# Patient Record
Sex: MALE | Race: ASIAN | NOT HISPANIC OR LATINO | Employment: OTHER | URBAN - METROPOLITAN AREA
[De-identification: names, ages, dates, MRNs, and addresses within clinical notes are randomized per-mention and may not be internally consistent; named-entity substitution may affect disease eponyms.]

---

## 2017-01-03 ENCOUNTER — THERAPY VISIT (OUTPATIENT)
Dept: PHYSICAL THERAPY | Facility: CLINIC | Age: 57
End: 2017-01-03
Payer: COMMERCIAL

## 2017-01-03 DIAGNOSIS — M54.42 MIDLINE LOW BACK PAIN WITH BILATERAL SCIATICA: Primary | ICD-10-CM

## 2017-01-03 DIAGNOSIS — M54.41 MIDLINE LOW BACK PAIN WITH BILATERAL SCIATICA: Primary | ICD-10-CM

## 2017-01-03 PROCEDURE — 97032 APPL MODALITY 1+ESTIM EA 15: CPT | Mod: GP | Performed by: PHYSICAL THERAPIST

## 2017-01-03 PROCEDURE — 97163 PT EVAL HIGH COMPLEX 45 MIN: CPT | Mod: GP | Performed by: PHYSICAL THERAPIST

## 2017-01-03 PROCEDURE — 97112 NEUROMUSCULAR REEDUCATION: CPT | Mod: GP | Performed by: PHYSICAL THERAPIST

## 2017-01-03 NOTE — PROGRESS NOTES
Subjective:                                       Pertinent medical history includes:  High blood pressure.  Medical allergies: no.  Other surgeries include:  None reported.  Current medications:  Pain medication, anti-depressants and high blood pressure medication.  Current occupation is none.                                  Objective:    System    Physical Exam    General     ROS    Assessment/Plan:

## 2017-01-03 NOTE — PROGRESS NOTES
Subjective:    Elaina Valenzuela is a 56 year old male with a lumbar condition.  Condition occurred with:  Other reason (failed surgery).  Condition occurred: for unknown reasons.  This is a chronic condition  Patient reports onset of low back pain in 2009 with no specific mechanism of injury. Patient reports having low back pain surgery in 2010 which did not help his symptoms..    Patient reports pain:  Central lumbar spine.  Radiates to:  Gluteals left, thigh right, thigh left, knee right, gluteals right, knee left, lower leg right and lower leg left.  Pain is described as aching and is constant and reported as 10/10.  Associated symptoms:  Numbness, loss of strength and loss of motion/stiffness. Pain is worse during the day.  Symptoms are exacerbated by walking, sitting, lifting, carrying, certain positions, bending and twisting and relieved by rest (supine).  Since onset symptoms are unchanged.  Special tests:  MRI (see epic).  Previous treatment includes physical therapy (poor outcome).    General health as reported by patient is good.                      Red flags:  None as reported by the patient.                      Objective:    Standing Alignment:    Cervical/Thoracic:  Forward head  Shoulder/UE:  Rounded shoulders              Gait:    Gait Type:  Antalgic   Assistive Devices:  Cane  Deviations:  Lumbar:  Trunk flexionGeneral Deviations:  Stride length decr and stance time decr               Lumbar/SI Evaluation  ROM:    AROM Lumbar:   Flexion:            Hands to mid thigh  Ext:                    Neutral, painful   Side Bend:        Left:  Hand to mid thigh, painful    Right:  Hand to mid thigh, painful  Rotation:           Left:  Limited, painful    Right:  Limited, painful  Side Glide:        Left:     Right:           Lumbar Myotomes:  Lumbar myotomes: grossly 3+/5 bilaterally.                Lumbar Dermtomes:  normal                Neural Tension/Mobility:    Left side:  SLR; SLR w/DF; Femoral  Nerve and Slump positive.   Right side:   SLR w/DF; Femoral Nerve; Slump and SLR positive.  Lumbar Palpation:  Palpation (lumbar): pain reported with palpation throughout the lumbar spine and gluts, bilaterally.      Functional Tests:  not assessed        Lumbar Provocation:    Left positive with:  Mobility and PROM hip  Right positive with: Mobility and PROM hip  Spinal Segmental Conclusions:     Level: Hypo noted at L5, L3, L2 and L4      SI joint/Sacrum:    Unremarkable                                                           General     ROS    Assessment/Plan:      Patient is a 56 year old male with lumbar complaints.    Patient has the following significant findings with corresponding treatment plan.                Diagnosis 1:  Low back pain  Pain -  hot/cold therapy, electric stimulation, self management, education and home program  Decreased ROM/flexibility - manual therapy, therapeutic exercise, therapeutic activity and home program  Decreased joint mobility - manual therapy, therapeutic exercise, therapeutic activity and home program  Decreased strength - therapeutic exercise, therapeutic activities and home program  Impaired gait - home program  Impaired muscle performance - neuro re-education and home program  Decreased function - therapeutic activities and home program  Impaired posture - neuro re-education, therapeutic activities and home program    Therapy Evaluation Codes:   1) History comprised of:   Personal factors that impact the plan of care:      Age, Coping style, Language, Past/current experiences, Social history/culture and Time since onset of symptoms.    Comorbidity factors that impact the plan of care are:      Bowel/bladder changes, Depression, Mental illness, Overweight and Weakness.     Medications impacting care: None.  2) Examination of Body Systems comprised of:   Body structures and functions that impact the plan of care:      Lumbar spine.   Activity limitations that impact the  plan of care are:      Bathing, Bending, Driving, Dressing, Jumping, Lifting, Running, Sitting, Squatting/kneeling and Standing.   Clinical presentation characteristics are:    Unstable/Unpredictable.  3) Presentation comprised of:   Presentation scored as High complexity with Unstable and unpredictable clinical characteristics..  4) Decision-Making    High complexity using standardized patient assessment instrument and/or measureable assessment of functional outcome.  Cumulative Therapy Evaluation is: High complexity.    Previous and current functional limitations:  (See Goal Flow Sheet for this information)    Short term and Long term goals: (See Goal Flow Sheet for this information)     Communication ability:  Patient appears to be able to clearly communicate and understand verbal and written communication and follow directions correctly.  Treatment Explanation - The following has been discussed with the patient:   RX ordered/plan of care  Anticipated outcomes  Possible risks and side effects  This patient would benefit from PT intervention to resume normal activities.   Rehab potential is good.    Frequency:  1 X week, once daily  Duration:  for 4 weeks  Discharge Plan:  Achieve all LTG.  Independent in home treatment program.  Reach maximal therapeutic benefit.    Please refer to the daily flowsheet for treatment today, total treatment time and time spent performing 1:1 timed codes.

## 2017-06-07 NOTE — PROGRESS NOTES
Subjective:    HPI                    Objective:    System    Physical Exam    General     ROS    Assessment/Plan:      DISCHARGE REPORT    Patient did not return to PT following the initial evaluation. Please see the evaluation note for the most recent subjective and objective findings. Patient will be discharged from PT services at this time.

## 2017-10-08 ENCOUNTER — HEALTH MAINTENANCE LETTER (OUTPATIENT)
Age: 57
End: 2017-10-08

## 2017-10-10 ENCOUNTER — OFFICE VISIT (OUTPATIENT)
Dept: FAMILY MEDICINE | Facility: CLINIC | Age: 57
End: 2017-10-10
Payer: COMMERCIAL

## 2017-10-10 VITALS
SYSTOLIC BLOOD PRESSURE: 124 MMHG | DIASTOLIC BLOOD PRESSURE: 78 MMHG | OXYGEN SATURATION: 97 % | BODY MASS INDEX: 32.15 KG/M2 | TEMPERATURE: 97.3 F | WEIGHT: 193 LBS | HEART RATE: 67 BPM | HEIGHT: 65 IN

## 2017-10-10 DIAGNOSIS — Z12.11 SCREEN FOR COLON CANCER: ICD-10-CM

## 2017-10-10 DIAGNOSIS — J45.20 MILD INTERMITTENT ASTHMA WITHOUT COMPLICATION: ICD-10-CM

## 2017-10-10 DIAGNOSIS — G89.29 CHRONIC LOW BACK PAIN, UNSPECIFIED BACK PAIN LATERALITY, WITH SCIATICA PRESENCE UNSPECIFIED: Primary | ICD-10-CM

## 2017-10-10 DIAGNOSIS — M54.5 CHRONIC LOW BACK PAIN, UNSPECIFIED BACK PAIN LATERALITY, WITH SCIATICA PRESENCE UNSPECIFIED: Primary | ICD-10-CM

## 2017-10-10 DIAGNOSIS — G60.9 IDIOPATHIC PERIPHERAL NEUROPATHY: ICD-10-CM

## 2017-10-10 DIAGNOSIS — I10 ESSENTIAL HYPERTENSION WITH GOAL BLOOD PRESSURE LESS THAN 140/90: ICD-10-CM

## 2017-10-10 DIAGNOSIS — N52.9 ERECTILE DYSFUNCTION, UNSPECIFIED ERECTILE DYSFUNCTION TYPE: ICD-10-CM

## 2017-10-10 DIAGNOSIS — Z13.6 CARDIOVASCULAR SCREENING; LDL GOAL LESS THAN 130: ICD-10-CM

## 2017-10-10 LAB
ALBUMIN SERPL-MCNC: 4 G/DL (ref 3.4–5)
ALP SERPL-CCNC: 88 U/L (ref 40–150)
ALT SERPL W P-5'-P-CCNC: 36 U/L (ref 0–70)
ANION GAP SERPL CALCULATED.3IONS-SCNC: 4 MMOL/L (ref 3–14)
AST SERPL W P-5'-P-CCNC: 27 U/L (ref 0–45)
BILIRUB SERPL-MCNC: 0.5 MG/DL (ref 0.2–1.3)
BUN SERPL-MCNC: 22 MG/DL (ref 7–30)
CALCIUM SERPL-MCNC: 8.8 MG/DL (ref 8.5–10.1)
CHLORIDE SERPL-SCNC: 97 MMOL/L (ref 94–109)
CHOLEST SERPL-MCNC: 197 MG/DL
CO2 SERPL-SCNC: 35 MMOL/L (ref 20–32)
CREAT SERPL-MCNC: 1.02 MG/DL (ref 0.66–1.25)
GFR SERPL CREATININE-BSD FRML MDRD: 76 ML/MIN/1.7M2
GLUCOSE SERPL-MCNC: 110 MG/DL (ref 70–99)
HDLC SERPL-MCNC: 34 MG/DL
LDLC SERPL CALC-MCNC: 113 MG/DL
NONHDLC SERPL-MCNC: 163 MG/DL
POTASSIUM SERPL-SCNC: 3.6 MMOL/L (ref 3.4–5.3)
PROT SERPL-MCNC: 8.5 G/DL (ref 6.8–8.8)
SODIUM SERPL-SCNC: 136 MMOL/L (ref 133–144)
TRIGL SERPL-MCNC: 250 MG/DL

## 2017-10-10 PROCEDURE — 99214 OFFICE O/P EST MOD 30 MIN: CPT | Performed by: FAMILY MEDICINE

## 2017-10-10 PROCEDURE — 80061 LIPID PANEL: CPT | Performed by: FAMILY MEDICINE

## 2017-10-10 PROCEDURE — 36415 COLL VENOUS BLD VENIPUNCTURE: CPT | Performed by: FAMILY MEDICINE

## 2017-10-10 PROCEDURE — 80053 COMPREHEN METABOLIC PANEL: CPT | Performed by: FAMILY MEDICINE

## 2017-10-10 RX ORDER — SILDENAFIL 100 MG/1
50-100 TABLET, FILM COATED ORAL DAILY PRN
Qty: 16 TABLET | Refills: 3 | Status: SHIPPED | OUTPATIENT
Start: 2017-10-10 | End: 2018-03-12

## 2017-10-10 RX ORDER — METOPROLOL TARTRATE 100 MG
100 TABLET ORAL 2 TIMES DAILY
Qty: 180 TABLET | Refills: 3 | Status: SHIPPED | OUTPATIENT
Start: 2017-10-10 | End: 2018-10-26

## 2017-10-10 RX ORDER — GABAPENTIN 300 MG/1
CAPSULE ORAL
Qty: 360 CAPSULE | Refills: 4 | Status: SHIPPED | OUTPATIENT
Start: 2017-10-10 | End: 2018-03-12

## 2017-10-10 RX ORDER — NAPROXEN 500 MG/1
500 TABLET ORAL 2 TIMES DAILY PRN
Qty: 180 TABLET | Refills: 5 | Status: SHIPPED | OUTPATIENT
Start: 2017-10-10 | End: 2018-12-17

## 2017-10-10 ASSESSMENT — ANXIETY QUESTIONNAIRES
2. NOT BEING ABLE TO STOP OR CONTROL WORRYING: SEVERAL DAYS
6. BECOMING EASILY ANNOYED OR IRRITABLE: SEVERAL DAYS
7. FEELING AFRAID AS IF SOMETHING AWFUL MIGHT HAPPEN: MORE THAN HALF THE DAYS
3. WORRYING TOO MUCH ABOUT DIFFERENT THINGS: MORE THAN HALF THE DAYS
5. BEING SO RESTLESS THAT IT IS HARD TO SIT STILL: NEARLY EVERY DAY
GAD7 TOTAL SCORE: 11
1. FEELING NERVOUS, ANXIOUS, OR ON EDGE: SEVERAL DAYS

## 2017-10-10 ASSESSMENT — PATIENT HEALTH QUESTIONNAIRE - PHQ9
5. POOR APPETITE OR OVEREATING: SEVERAL DAYS
SUM OF ALL RESPONSES TO PHQ QUESTIONS 1-9: 10

## 2017-10-10 ASSESSMENT — PAIN SCALES - GENERAL: PAINLEVEL: SEVERE PAIN (6)

## 2017-10-10 NOTE — PROGRESS NOTES
SUBJECTIVE:   Elaina Valenzuela is a 56 year old male who presents to clinic today for the following health issues:      Hypertension Follow-up      Outpatient blood pressures are being checked at store.  Results are 120-130/ 80's.    Low Salt Diet: no added salt    Depression and Anxiety Follow-Up    Status since last visit: Improved      Other associated symptoms:None    Complicating factors:     Significant life event: more trouble with walking     Current substance abuse: None    PHQ-9 Score and MyChart F/U Questions 11/9/2015 1/19/2016 12/29/2016   Total Score 15 7 6   Q9: Suicide Ideation Several days Not at all Not at all     JHONY-7 SCORE 6/8/2015 11/9/2015 12/29/2016   Total Score 17 - -   Total Score - 12 10       PHQ-9  English  PHQ-9   Any Language  GAD7  Suicide Assessment Five-step Evaluation and Treatment (SAFE-T)  Asthma Follow-Up    Was ACT completed today?    Yes    ACT Total Scores 12/22/2016   ACT TOTAL SCORE (Goal Greater than or Equal to 20) 19   In the past 12 months, how many times did you visit the emergency room for your asthma without being admitted to the hospital? 0   In the past 12 months, how many times were you hospitalized overnight because of your asthma? 0       Recent asthma triggers that patient is dealing with: None              Amount of exercise or physical activity: 2-3 days/week for an average of less than 15 minutes    Problems taking medications regularly: No    Medication side effects: none  Diet: low salt           Problem list and histories reviewed & adjusted, as indicated.  Additional history: as documented    Patient Active Problem List   Diagnosis     Chronic low back pain     Left leg weakness     CARDIOVASCULAR SCREENING; LDL GOAL LESS THAN 130     Urinary retention     Neurogenic bladder     Moderate major depression (H)     Mild intermittent asthma without complication     Idiopathic peripheral neuropathy     Erectile dysfunction, unspecified erectile dysfunction  type     Benign essential hypertension     Obesity due to excess calories, unspecified obesity severity     Past Surgical History:   Procedure Laterality Date     C NONSPECIFIC PROCEDURE      Rt. 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       Social History   Substance Use Topics     Smoking status: Never Smoker     Smokeless tobacco: Never Used     Alcohol use Yes      Comment: rare     Family History   Problem Relation Age of Onset     Hypertension Mother          Current Outpatient Prescriptions   Medication Sig Dispense Refill     metoprolol (LOPRESSOR) 100 MG tablet Take 1 tablet (100 mg) by mouth 2 times daily 180 tablet 3     naproxen (NAPROSYN) 500 MG tablet Take 1 tablet (500 mg) by mouth 2 times daily as needed for moderate pain 180 tablet 5     gabapentin (NEURONTIN) 300 MG capsule Take 2 in the morning and 2 at night 360 capsule 4     sildenafil (VIAGRA) 100 MG tablet Take 0.5-1 tablets ( mg) by mouth daily as needed Take 30 min to 4 hours before intercourse.  Never use with nitroglycerin, terazosin or doxazosin. 16 tablet 3     amitriptyline (ELAVIL) 100 MG tablet TAKE ONE TABLET BY MOUTH AT BEDTIME 90 tablet 3     albuterol (2.5 MG/3ML) 0.083% neb solution Take 1 vial (2.5 mg) by nebulization every 6 hours as needed for shortness of breath / dyspnea or wheezing 60 vial 6     hydrochlorothiazide (HYDRODIURIL) 25 MG tablet Take 1 tablet (25 mg) by mouth every morning 90 tablet 3     albuterol  (PROAIR HFA/PROVENTIL HFA/VENTOLIN HFA) 108 (90 BASE) MCG/ACT Inhaler Inhale 2 puffs into the lungs every 6 hours as needed for shortness of breath / dyspnea 1 Inhaler 5     order for DME Equipment being ordered: Nebulizer 1 each 0     citalopram (CELEXA) 10 MG tablet Take 1 tablet (10 mg) by mouth daily 90 tablet 4     TYLENOL EXTRA STRENGTH 500 MG PO TABS  2 tablets as needed       BENEFIBER DRINK MIX OR POWD 2 teaspoons w/water       [DISCONTINUED] metoprolol (LOPRESSOR) 100 MG tablet Take 1 tablet (100 mg) by mouth 2 times daily 180 tablet 3     [DISCONTINUED] gabapentin (NEURONTIN) 300 MG capsule Take 2 in the morning and 2 at night 360 capsule 4     [DISCONTINUED] sildenafil (VIAGRA) 100 MG cap/tab Take 0.5-1 tablets ( mg) by mouth daily as needed for erectile dysfunction Take 30 min to 4 hours before intercourse.  Never use with nitroglycerin, terazosin or doxazosin. 16 tablet 3     Allergies   Allergen Reactions     Penicillins Rash     BP Readings from Last 3 Encounters:   10/10/17 124/78   12/22/16 134/80   05/19/16 (!) 131/97    Wt Readings from Last 3 Encounters:   10/10/17 193 lb (87.5 kg)   12/22/16 196 lb (88.9 kg)   05/13/16 170 lb (77.1 kg)                  Labs reviewed in EPIC        Reviewed and updated as needed this visit by clinical staffTobacco  Allergies  Meds       Reviewed and updated as needed this visit by Provider         ROS:  This 56 year old male is here today to get his medications refilled. He is disabled from a back surgery that left him in chronic back pain and severe weakness in his legs such that he has to walk with a cane. He tried to ride a bike at the health [employer]. He feels the best when he is lying flat in bed. Sitting and standing too long makes his pain worse. He would like to see a neurosurgeon for a 2nd opinion about taking the hardware out of his back. His chronic pain is hard to deal with some days.   His asthma is under good control. Rarely uses albuterol  He  "is faithful with his meds  He is trying to eat healthy to avoid weight gain.  No shortness of breath with walking  Is sleeping OK  He needs another colonoscopy in 2017. His prep wasn't very good for his 2016 colonoscopy   All other review of systems are negative  Personal, family, and social history reviewed with patient and revised.         OBJECTIVE:     /78  Pulse 67  Temp 97.3  F (36.3  C) (Oral)  Ht 5' 5\" (1.651 m)  Wt 193 lb (87.5 kg)  SpO2 97%  BMI 32.12 kg/m2  Body mass index is 32.12 kg/(m^2).  GENERAL: healthy, alert and no distress  NECK: no adenopathy, no asymmetry, masses, or scars and thyroid normal to palpation  RESP: lungs clear to auscultation - no rales, rhonchi or wheezes  CV: regular rate and rhythm, normal S1 S2, no S3 or S4, no murmur, click or rub, no peripheral edema and peripheral pulses strong  ABDOMEN: soft, nontender, no hepatosplenomegaly, no masses and bowel sounds normal  MS: no gross musculoskeletal defects noted, no edema  But he has a lot of low back pain when he tries to stand up. His legs are very weak when he tries to walk. Needs to use a cane at all times.   He appears comfortable in a sitting position.   Well hydrated  Well nourished  Well groomed      Diagnostic Test Results:  none     ASSESSMENT/PLAN:              1. Chronic low back pain, unspecified back pain laterality, with sciatica presence unspecified  As above, continue same meds   - naproxen (NAPROSYN) 500 MG tablet; Take 1 tablet (500 mg) by mouth 2 times daily as needed for moderate pain  Dispense: 180 tablet; Refill: 5  - COMPREHENSIVE METABOLIC PANEL  - NEUROSURGERY REFERRAL    2. Essential hypertension with goal blood pressure less than 140/90  Good control   - metoprolol (LOPRESSOR) 100 MG tablet; Take 1 tablet (100 mg) by mouth 2 times daily  Dispense: 180 tablet; Refill: 3  - COMPREHENSIVE METABOLIC PANEL    3. Idiopathic peripheral neuropathy  Good control   - gabapentin (NEURONTIN) 300 MG " capsule; Take 2 in the morning and 2 at night  Dispense: 360 capsule; Refill: 4    4. Erectile dysfunction, unspecified erectile dysfunction type  Good control   - sildenafil (VIAGRA) 100 MG tablet; Take 0.5-1 tablets ( mg) by mouth daily as needed Take 30 min to 4 hours before intercourse.  Never use with nitroglycerin, terazosin or doxazosin.  Dispense: 16 tablet; Refill: 3      5. Screen for colon cancer  Due   - GASTROENTEROLOGY ADULT REF PROCEDURE ONLY    6. Mild intermittent asthma without complication  ACT = 20  - Asthma Action Plan (AAP)    7. CARDIOVASCULAR SCREENING; LDL GOAL LESS THAN 130  due  - Lipid panel reflex to direct LDL    Return to clinic 6 months     OSCAR RODRIGUEZ MD  [facility]

## 2017-10-10 NOTE — MR AVS SNAPSHOT
After Visit Summary   10/10/2017    Elaina Valenzuela    MRN: [id number]           Patient Information     Date Of Birth          1960        Visit Information        Provider Department      10/10/2017 10:30 AM Libby Baker MD [facility]        Today's Diagnoses     Chronic low back pain, unspecified back pain laterality, with sciatica presence unspecified    -  1    Essential hypertension with goal blood pressure less than 140/90        Idiopathic peripheral neuropathy        Erectile dysfunction, unspecified erectile dysfunction type        Need for prophylactic vaccination and inoculation against influenza        Screen for colon cancer        Mild intermittent asthma without complication        CARDIOVASCULAR SCREENING; LDL GOAL LESS THAN 130          Care Instructions    [facility]    If you have any questions regarding to your visit please contact your care team:       Team Purple:   Clinic Hours Telephone Number   Dr. Libby Marti     7am-7pm  Monday - Thursday   7am-5pm  Fridays  [phone]  (Appointment scheduling available 24/7)    Questions about your Visit?   Team Line:  [phone]   Urgent Care - [place] - 11am-9pm Monday-Friday Saturday-Sunday- 9am-5pm   [place] - 5pm-9pm Monday-Friday Saturday-Sunday- 9am-5pm  [phone] - [place]   [phone] - [place]       What options do I have for visits at the clinic other than the traditional office visit?  To expand how we care for you, many of our providers are utilizing electronic visits (e-visits) and telephone visits, when medically appropriate, for interactions with their patients rather than a visit in the clinic.   We also offer nurse visits for many medical concerns. Just like any other service, we will bill your insurance company for this type of visit based on time spent on the phone with your provider. Not  all insurance companies cover these visits. Please check with your medical insurance if this type of visit is covered. You will be responsible for any charges that are not paid by your insurance.      E-visits via Trackyhart:  generally incur a $35.00 fee.  Telephone visits:  Time spent on the phone: *charged based on time that is spent on the phone in increments of 10 minutes. Estimated cost:   5-10 mins $30.00   11-20 mins. $59.00   21-30 mins. $85.00     Use TASCET (secure email communication and access to your chart) to send your primary care provider a message or make an appointment. Ask someone on your Team how to sign up for TASCET.  For a Price Quote for your services, please call our Giveit100 Line at [phone].  As always, Thank you for trusting us with your health care needs!              Follow-ups after your visit        Additional Services     GASTROENTEROLOGY ADULT REF PROCEDURE ONLY       Last Lab Result: Creatinine (mg/dL)       Date                     Value                 12/22/2016               0.99             ----------  Body mass index is 32.12 kg/(m^2).     Needed:  No  Language:  English    Patient will be contacted to schedule procedure.     Please be aware that coverage of these services is subject to the terms and limitations of your health insurance plan.  Call member services at your health plan with any benefit or coverage questions.  Any procedures must be performed at a [place] facility OR coordinated by your clinic's referral office.    Please bring the following with you to your appointment:    (1) Any X-Rays, CTs or MRIs which have been performed.  Contact the facility where they were done to arrange for  prior to your scheduled appointment.    (2) List of current medications   (3) This referral request   (4) Any documents/labs given to you for this referral            NEUROSURGERY REFERRAL       Your provider has referred you to: JESICA: [facility] [place]  "[facility] [phone]   [email or web address]    Please be aware that coverage of these services is subject to the terms and limitations of your health insurance plan.  Call member services at your health plan with any benefit or coverage questions.      Please bring the following with you to your appointment:    (1) Any X-Rays, CTs or MRIs which have been performed.  Contact the facility where they were done to arrange for  prior to your scheduled appointment.   (2) List of current medications  (3) This referral request   (4) Any documents/labs given to you for this referral                  Who to contact     If you have questions or need follow up information about today's clinic visit or your schedule please contact [facility] directly at [phone].  Normal or non-critical lab and imaging results will be communicated to you by MyChart, letter or phone within 4 business days after the clinic has received the results. If you do not hear from us within 7 days, please contact the clinic through MyChart or phone. If you have a critical or abnormal lab result, we will notify you by phone as soon as possible.  Submit refill requests through TURN8 or call your pharmacy and they will forward the refill request to us. Please allow 3 business days for your refill to be completed.          Additional Information About Your Visit        TURN8 Information     TURN8 lets you send messages to your doctor, view your test results, renew your prescriptions, schedule appointments and more. To sign up, go to [email or web address] . Click on \"Log in\" on the left side of the screen, which will take you to the Welcome page. Then click on \"Sign up Now\" on the right side of the page.     You will be asked to enter the access code listed below, as well as some personal information. Please follow the directions to create your username and password.     Your access code " "is: [email or web address]  Expires: 2018 11:07 AM     Your access code will  in 90 days. If you need help or a new code, please call your [place] clinic or [phone].        Care EveryWhere ID     This is your Care EveryWhere ID. This could be used by other organizations to access your [place] medical records  [id number]        Your Vitals Were     Pulse Temperature Height Pulse Oximetry BMI (Body Mass Index)       67 97.3  F (36.3  C) (Oral) 5' 5\" (1.651 m) 97% 32.12 kg/m2        Blood Pressure from Last 3 Encounters:   10/10/17 124/78   16 134/80   16 (!) 131/97    Weight from Last 3 Encounters:   10/10/17 193 lb (87.5 kg)   16 196 lb (88.9 kg)   16 170 lb (77.1 kg)              We Performed the Following     Asthma Action Plan (AAP)     COMPREHENSIVE METABOLIC PANEL     GASTROENTEROLOGY ADULT REF PROCEDURE ONLY     Lipid panel reflex to direct LDL     NEUROSURGERY REFERRAL          Today's Medication Changes          These changes are accurate as of: 10/10/17 11:07 AM.  If you have any questions, ask your nurse or doctor.               These medicines have changed or have updated prescriptions.        Dose/Directions    sildenafil 100 MG tablet   Commonly known as:  VIAGRA   This may have changed:  reasons to take this   Used for:  Erectile dysfunction, unspecified erectile dysfunction type   Changed by:  Libby Baker MD        Dose:   mg   Take 0.5-1 tablets ( mg) by mouth daily as needed Take 30 min to 4 hours before intercourse.  Never use with nitroglycerin, terazosin or doxazosin.   Quantity:  16 tablet   Refills:  3            Where to get your medicines      These medications were sent to [id number] - [place] - [address] AT [facility] & [place]  [address] [place] [address]     Phone:  [phone]     gabapentin 300 MG capsule    metoprolol 100 MG tablet    naproxen 500 MG tablet         Some of " these will need a paper prescription and others can be bought over the counter.  Ask your nurse if you have questions.     Bring a paper prescription for each of these medications     sildenafil 100 MG tablet                Primary Care Provider Office Phone # Fax #    Libby Baker [phone]       [address] [address]        Equal Access to Services     ANGÉLICA COHEN : Hadii aad ku hadasho Soomaali, waaxda luqadaha, qaybta kaalmada adeegyada, waxay idiin hayaan adeeg kharash la'aan ah. So [facility] [phone].    ATENCIÓN: Si habla espmicha, tiene a davis disposición servicios gratuitos de asistencia lingüística. Llame al [phone].    We comply with applicable federal civil rights laws and [place] laws. We do not discriminate on the basis of race, color, national origin, age, disability, sex, sexual orientation, or gender identity.            Thank you!     Thank you for choosing [facility]  for your care. Our goal is always to provide you with excellent care. Hearing back from our patients is one way we can continue to improve our services. Please take a few minutes to complete the written survey that you may receive in the mail after your visit with us. Thank you!             Your Updated Medication List - Protect others around you: Learn how to safely use, store and throw away your medicines at www.disposemymeds.org.          This list is accurate as of: 10/10/17 11:07 AM.  Always use your most recent med list.                   Brand Name Dispense Instructions for use Diagnosis    * albuterol (2.5 MG/3ML) 0.083% neb solution     60 vial    Take 1 vial (2.5 mg) by nebulization every 6 hours as needed for shortness of breath / dyspnea or wheezing    Mild intermittent asthma with acute exacerbation       * albuterol 108 (90 BASE) MCG/ACT Inhaler    PROAIR HFA/PROVENTIL HFA/VENTOLIN HFA    1 Inhaler    Inhale 2 puffs into the  lungs every 6 hours as needed for shortness of breath / dyspnea    Mild intermittent asthma with acute exacerbation       amitriptyline 100 MG tablet    ELAVIL    90 tablet    TAKE ONE TABLET BY MOUTH AT BEDTIME    Chronic low back pain, unspecified back pain laterality, with sciatica presence unspecified, Major depressive disorder, recurrent episode, moderate (H)       BENEFIBER DRINK MIX Powd      2 teaspoons w/water        citalopram 10 MG tablet    celeXA    90 tablet    Take 1 tablet (10 mg) by mouth daily    Major depressive disorder, recurrent episode, moderate (H)       gabapentin 300 MG capsule    NEURONTIN    360 capsule    Take 2 in the morning and 2 at night    Idiopathic peripheral neuropathy       hydrochlorothiazide 25 MG tablet    HYDRODIURIL    90 tablet    Take 1 tablet (25 mg) by mouth every morning    Essential hypertension with goal blood pressure less than 140/90       metoprolol 100 MG tablet    LOPRESSOR    180 tablet    Take 1 tablet (100 mg) by mouth 2 times daily    Essential hypertension with goal blood pressure less than 140/90       naproxen 500 MG tablet    NAPROSYN    180 tablet    Take 1 tablet (500 mg) by mouth 2 times daily as needed for moderate pain    Chronic low back pain, unspecified back pain laterality, with sciatica presence unspecified       order for DME     1 each    Equipment being ordered: Nebulizer    Mild intermittent asthma with acute exacerbation       sildenafil 100 MG tablet    VIAGRA    16 tablet    Take 0.5-1 tablets ( mg) by mouth daily as needed Take 30 min to 4 hours before intercourse.  Never use with nitroglycerin, terazosin or doxazosin.    Erectile dysfunction, unspecified erectile dysfunction type       TYLENOL 500 MG tablet   Generic drug:  acetaminophen      2 tablets as needed        * Notice:  This list has 2 medication(s) that are the same as other medications prescribed for you. Read the directions carefully, and ask your doctor or other  care provider to review them with you.

## 2017-10-10 NOTE — LETTER
My Asthma Action Plan  Name: Elaina Valenzuela   YOB: 1960  Date: 10/10/2017   My doctor: OSCAR RODRIGUEZ MD   My clinic: [facility]        My Control Medicine: None  My Rescue Medicine: Albuterol nebulizer solution use every 4 hours as needed   Albuterol (Proair/Ventolin/Proventil) inhaler 2 puffs every 4 hours as needed    My Asthma Severity: intermittent  Avoid your asthma triggers: smoke, upper respiratory infections, dust mites and pollens  None            GREEN ZONE   Good Control    I feel good    No cough or wheeze    Can work, sleep and play without asthma symptoms       Take your asthma control medicine every day.     1. If exercise triggers your asthma, take your rescue medication    15 minutes before exercise or sports, and    During exercise if you have asthma symptoms  2. Spacer to use with inhaler: If you have a spacer, make sure to use it with your inhaler             YELLOW ZONE Getting Worse  I have ANY of these:    I do not feel good    Cough or wheeze    Chest feels tight    Wake up at night   1. Keep taking your Green Zone medications  2. Start taking your rescue medicine:    every 20 minutes for up to 1 hour. Then every 4 hours for 24-48 hours.  3. If you stay in the Yellow Zone for more than 12-24 hours, contact your doctor.  4. If you do not return to the Green Zone in 12-24 hours or you get worse, start taking your oral steroid medicine if prescribed by your provider.           RED ZONE Medical Alert - Get Help  I have ANY of these:    I feel awful    Medicine is not helping    Breathing getting harder    Trouble walking or talking    Nose opens wide to breathe       1. Take your rescue medicine NOW  2. If your provider has prescribed an oral steroid medicine, start taking it NOW  3. Call your doctor NOW  4. If you are still in the Red Zone after 20 minutes and you have not reached your doctor:    Take your rescue medicine again and    Call 911 or go to the  emergency room right away    See your regular doctor within 2 weeks of an Emergency Room or Urgent Care visit for follow-up treatment.        Electronically signed by: OSCAR RODRIGUEZ, October 10, 2017    Annual Reminders:  Meet with Asthma Educator,  Flu Shot in the Fall, consider Pneumonia Vaccination for patients with asthma (aged 19 and older).    Pharmacy:    [facility]  [id number] - [place], [place] - [address] [facility]/PHARMACY #[id number] - [place], [place] - [address] [facility], [place] - [address] [facility] [address] [place] [address] [facility] [place] [place] - [address]  [facility] - [facility], [place] - [address] AT [facility]                    Asthma Triggers  How To Control Things That Make Your Asthma Worse    Triggers are things that make your asthma worse.  Look at the list below to help you find your triggers and what you can do about them.  You can help prevent asthma flare-ups by staying away from your triggers.      Trigger                                                          What you can do   Cigarette Smoke  Tobacco smoke can make asthma worse. Do not allow smoking in your home, car or around you.  Be sure no one smokes at a child s day care or school.  If you smoke, ask your health care provider for ways to help you quit.  Ask family members to quit too.  Ask your health care provider for a referral to Quit Plan to help you quit smoking, or call 8-297-529-PLAN.     Colds, Flu, Bronchitis  These are common triggers of asthma. Wash your hands often.  Don t touch your eyes, nose or mouth.  Get a flu shot every year.     Dust Mites  These are tiny bugs that live in cloth or carpet. They are too small to see. Wash sheets and blankets in hot water every week.   Encase pillows and mattress in dust mite proof covers.  Avoid having carpet if you can. If you have  carpet, vacuum weekly.   Use a dust mask and HEPA vacuum.   Pollen and Outdoor Mold  Some people are allergic to trees, grass, or weed pollen, or molds. Try to keep your windows closed.  Limit time out doors when pollen count is high.   Ask you health care provider about taking medicine during allergy season.     Animal Dander  Some people are allergic to skin flakes, urine or saliva from pets with fur or feathers. Keep pets with fur or feathers out of your home.    If you can t keep the pet outdoors, then keep the pet out of your bedroom.  Keep the bedroom door closed.  Keep pets off cloth furniture and away from stuffed toys.     Mice, Rats, and Cockroaches  Some people are allergic to the waste from these pests.   Cover food and garbage.  Clean up spills and food crumbs.  Store grease in the refrigerator.   Keep food out of the bedroom.   Indoor Mold  This can be a trigger if your home has high moisture. Fix leaking faucets, pipes, or other sources of water.   Clean moldy surfaces.  Dehumidify basement if it is damp and smelly.   Smoke, Strong Odors, and Sprays  These can reduce air quality. Stay away from strong odors and sprays, such as perfume, powder, hair spray, paints, smoke incense, paint, cleaning products, candles and new carpet.   Exercise or Sports  Some people with asthma have this trigger. Be active!  Ask your doctor about taking medicine before sports or exercise to prevent symptoms.    Warm up for 5-10 minutes before and after sports or exercise.     Other Triggers of Asthma  Cold air:  Cover your nose and mouth with a scarf.  Sometimes laughing or crying can be a trigger.  Some medicines and food can trigger asthma.

## 2017-10-10 NOTE — PATIENT INSTRUCTIONS
[facility]    If you have any questions regarding to your visit please contact your care team:       Team Purple:   Clinic Hours Telephone Number   Dr. Libby Marti     7am-7pm  Monday - Thursday   7am-5pm  Fridays  [phone]  (Appointment scheduling available 24/7)    Questions about your Visit?   Team Line:  [phone]   Urgent Care - [place] and [facility] - 11am-9pm Monday-Friday Saturday-Sunday- 9am-5pm   [place] - 5pm-9pm Monday-Friday Saturday-Sunday- 9am-5pm  [phone] - [facility]  [phone] - [place]       What options do I have for visits at the clinic other than the traditional office visit?  To expand how we care for you, many of our providers are utilizing electronic visits (e-visits) and telephone visits, when medically appropriate, for interactions with their patients rather than a visit in the clinic.   We also offer nurse visits for many medical concerns. Just like any other service, we will bill your insurance company for this type of visit based on time spent on the phone with your provider. Not all insurance companies cover these visits. Please check with your medical insurance if this type of visit is covered. You will be responsible for any charges that are not paid by your insurance.      E-visits via VitaSensis:  generally incur a $35.00 fee.  Telephone visits:  Time spent on the phone: *charged based on time that is spent on the phone in increments of 10 minutes. Estimated cost:   5-10 mins $30.00   11-20 mins. $59.00   21-30 mins. $85.00     Use Gecko TVt (secure email communication and access to your chart) to send your primary care provider a message or make an appointment. Ask someone on your Team how to sign up for VitaSensis.  For a Price Quote for your services, please call our Consumer Price Line at [phone].  As always, Thank you for trusting us with your health care needs!

## 2017-10-10 NOTE — NURSING NOTE
"Chief Complaint   Patient presents with     Recheck Medication       Initial /78  Pulse 67  Temp 97.3  F (36.3  C) (Oral)  Ht 5' 5\" (1.651 m)  Wt 193 lb (87.5 kg)  SpO2 97%  BMI 32.12 kg/m2 Estimated body mass index is 32.12 kg/(m^2) as calculated from the following:    Height as of this encounter: 5' 5\" (1.651 m).    Weight as of this encounter: 193 lb (87.5 kg).  Medication Reconciliation: complete    "

## 2017-10-10 NOTE — LETTER
[facility]  [address] [place] [address]. [place] [address]    October 10, 2017    Elaina Bianca  [address]  [place] [address]      Dear Elaina,    Your lab tests are looking OK, but your triglycerides and glucose are way too high. This means you are eating too many carbohydrates and calories. Please try to follow a diet that is high in lean protein and vegetables and low in carbohydrates (such as potatoes, rice, breads, cereals, pasta, pizza, crackers, chips, desserts, juices, alcohol, soda pop, and candies).     Enclosed is a copy of your results.   Results for orders placed or performed in visit on 10/10/17   COMPREHENSIVE METABOLIC PANEL   Result Value Ref Range    Sodium 136 133 - 144 mmol/L    Potassium 3.6 3.4 - 5.3 mmol/L    Chloride 97 94 - 109 mmol/L    Carbon Dioxide 35 (H) 20 - 32 mmol/L    Anion Gap 4 3 - 14 mmol/L    Glucose 110 (H) 70 - 99 mg/dL    Urea Nitrogen 22 7 - 30 mg/dL    Creatinine 1.02 0.66 - 1.25 mg/dL    GFR Estimate 76 >60 mL/min/1.7m2    GFR Estimate If Black >90 >60 mL/min/1.7m2    Calcium 8.8 8.5 - 10.1 mg/dL    Bilirubin Total 0.5 0.2 - 1.3 mg/dL    Albumin 4.0 3.4 - 5.0 g/dL    Protein Total 8.5 6.8 - 8.8 g/dL    Alkaline Phosphatase 88 40 - 150 U/L    ALT 36 0 - 70 U/L    AST 27 0 - 45 U/L   Lipid panel reflex to direct LDL   Result Value Ref Range    Cholesterol 197 <200 mg/dL    Triglycerides 250 (H) <150 mg/dL    HDL Cholesterol 34 (L) >39 mg/dL    LDL Cholesterol Calculated 113 (H) <100 mg/dL    Non HDL Cholesterol 163 (H) <130 mg/dL       If you have any questions or concerns, please call myself or my nurse at [phone].    Sincerely,    Libby Baker MD/darya

## 2017-10-11 ASSESSMENT — ASTHMA QUESTIONNAIRES: ACT_TOTALSCORE: 20

## 2017-10-11 ASSESSMENT — ANXIETY QUESTIONNAIRES: GAD7 TOTAL SCORE: 11

## 2018-03-12 ENCOUNTER — OFFICE VISIT (OUTPATIENT)
Dept: FAMILY MEDICINE | Facility: CLINIC | Age: 58
End: 2018-03-12
Payer: COMMERCIAL

## 2018-03-12 VITALS
DIASTOLIC BLOOD PRESSURE: 60 MMHG | BODY MASS INDEX: 29.82 KG/M2 | TEMPERATURE: 97 F | SYSTOLIC BLOOD PRESSURE: 122 MMHG | WEIGHT: 179 LBS | OXYGEN SATURATION: 95 % | HEIGHT: 65 IN | RESPIRATION RATE: 20 BRPM | HEART RATE: 68 BPM

## 2018-03-12 DIAGNOSIS — G60.9 IDIOPATHIC PERIPHERAL NEUROPATHY: ICD-10-CM

## 2018-03-12 DIAGNOSIS — J45.21 MILD INTERMITTENT ASTHMA WITH ACUTE EXACERBATION: ICD-10-CM

## 2018-03-12 DIAGNOSIS — M54.5 CHRONIC LOW BACK PAIN, UNSPECIFIED BACK PAIN LATERALITY, WITH SCIATICA PRESENCE UNSPECIFIED: Primary | ICD-10-CM

## 2018-03-12 DIAGNOSIS — R29.898 LEG WEAKNESS, BILATERAL: ICD-10-CM

## 2018-03-12 DIAGNOSIS — G89.29 CHRONIC LOW BACK PAIN, UNSPECIFIED BACK PAIN LATERALITY, WITH SCIATICA PRESENCE UNSPECIFIED: Primary | ICD-10-CM

## 2018-03-12 DIAGNOSIS — I10 ESSENTIAL HYPERTENSION WITH GOAL BLOOD PRESSURE LESS THAN 140/90: ICD-10-CM

## 2018-03-12 DIAGNOSIS — F33.1 MAJOR DEPRESSIVE DISORDER, RECURRENT EPISODE, MODERATE (H): ICD-10-CM

## 2018-03-12 PROCEDURE — 99214 OFFICE O/P EST MOD 30 MIN: CPT | Performed by: FAMILY MEDICINE

## 2018-03-12 RX ORDER — ALBUTEROL SULFATE 90 UG/1
2 AEROSOL, METERED RESPIRATORY (INHALATION) EVERY 6 HOURS PRN
Qty: 1 INHALER | Refills: 5 | Status: SHIPPED | OUTPATIENT
Start: 2018-03-12 | End: 2018-07-23

## 2018-03-12 RX ORDER — AMITRIPTYLINE HYDROCHLORIDE 100 MG/1
TABLET ORAL
Qty: 90 TABLET | Refills: 3 | Status: SHIPPED | OUTPATIENT
Start: 2018-03-12 | End: 2019-05-17

## 2018-03-12 RX ORDER — ALBUTEROL SULFATE 0.83 MG/ML
1 SOLUTION RESPIRATORY (INHALATION) EVERY 6 HOURS PRN
Qty: 60 VIAL | Refills: 6 | Status: SHIPPED | OUTPATIENT
Start: 2018-03-12 | End: 2018-07-23

## 2018-03-12 RX ORDER — GABAPENTIN 300 MG/1
CAPSULE ORAL
Qty: 540 CAPSULE | Refills: 4 | Status: SHIPPED | OUTPATIENT
Start: 2018-03-12 | End: 2019-02-20

## 2018-03-12 RX ORDER — CITALOPRAM HYDROBROMIDE 10 MG/1
10 TABLET ORAL DAILY
Qty: 90 TABLET | Refills: 4 | Status: SHIPPED | OUTPATIENT
Start: 2018-03-12 | End: 2019-08-02

## 2018-03-12 RX ORDER — HYDROCHLOROTHIAZIDE 25 MG/1
25 TABLET ORAL EVERY MORNING
Qty: 90 TABLET | Refills: 3 | Status: SHIPPED | OUTPATIENT
Start: 2018-03-12 | End: 2019-03-04

## 2018-03-12 NOTE — LETTER
My Depression Action Plan  Name: Elaina Valenzuela   Date of Birth 1960  Date: 3/12/2018    My doctor: Libby Baker   My clinic: [place] [address] [facility]  [place] [address]  [phone]          GREEN    ZONE   Good Control    What it looks like:     Things are going generally well. You have normal up s and down s. You may even feel depressed from time to time, but bad moods usually last less than a day.   What you need to do:  1. Continue to care for yourself (see self care plan)  2. Check your depression survival kit and update it as needed  3. Follow your physician s recommendations including any medication.  4. Do not stop taking medication unless you consult with your physician first.           YELLOW         ZONE Getting Worse    What it looks like:     Depression is starting to interfere with your life.     It may be hard to get out of bed; you may be starting to isolate yourself from others.    Symptoms of depression are starting to last most all day and this has happened for several days.     You may have suicidal thoughts but they are not constant.   What you need to do:     1. Call your care team, your response to treatment will improve if you keep your care team informed of your progress. Yellow periods are signs an adjustment may need to be made.     2. Continue your self-care, even if you have to fake it!    3. Talk to someone in your support network    4. Open up your depression survival kit           RED    ZONE Medical Alert - Get Help    What it looks like:     Depression is seriously interfering with your life.     You may experience these or other symptoms: You can t get out of bed most days, can t work or engage in other necessary activities, you have trouble taking care of basic hygiene, or basic responsibilities, thoughts of suicide or death that will not go away, self-injurious behavior.     What you need to do:  1. Call your care team and  request a same-day appointment. If they are not available (weekends or after hours) call your local crisis line, emergency room or 911.      Electronically signed by: OSCAR RODRIGUEZ, March 12, 2018    Depression Self Care Plan / Survival Kit    Self-Care for Depression  Here s the deal. Your body and mind are really not as separate as most people think.  What you do and think affects how you feel and how you feel influences what you do and think. This means if you do things that people who feel good do, it will help you feel better.  Sometimes this is all it takes.  There is also a place for medication and therapy depending on how severe your depression is, so be sure to consult with your medical provider and/ or Behavioral Health Consultant if your symptoms are worsening or not improving.     In order to better manage my stress, I will:    Exercise  Get some form of exercise, every day. This will help reduce pain and release endorphins, the  feel good  chemicals in your brain. This is almost as good as taking antidepressants!  This is not the same as joining a gym and then never going! (they count on that by the way ) It can be as simple as just going for a walk or doing some gardening, anything that will get you moving.      Hygiene   Maintain good hygiene (Get out of bed in the morning, Make your bed, Brush your teeth, Take a shower, and Get dressed like you were going to work, even if you are unemployed).  If your clothes don't fit try to get ones that do.    Diet  I will strive to eat foods that are good for me, drink plenty of water, and avoid excessive sugar, caffeine, alcohol, and other mood-altering substances.  Some foods that are helpful in depression are: complex carbohydrates, B vitamins, flaxseed, fish or fish oil, fresh fruits and vegetables.    Psychotherapy  I agree to participate in Individual Therapy (if recommended).    Medication  If prescribed medications, I agree to take them.  Missing  doses can result in serious side effects.  I understand that drinking alcohol, or other illicit drug use, may cause potential side effects.  I will not stop my medication abruptly without first discussing it with my provider.    Staying Connected With Others  I will stay in touch with my friends, family members, and my primary care provider/team.    Use your imagination  Be creative.  We all have a creative side; it doesn t matter if it s oil painting, sand castles, or mud pies! This will also kick up the endorphins.    Witness Beauty  (AKA stop and smell the roses) Take a look outside, even in mid-winter. Notice colors, textures. Watch the squirrels and birds.     Service to others  Be of service to others.  There is always someone else in need.  By helping others we can  get out of ourselves  and remember the really important things.  This also provides opportunities for practicing all the other parts of the program.    Humor  Laugh and be silly!  Adjust your TV habits for less news and crime-drama and more comedy.    Control your stress  Try breathing deep, massage therapy, biofeedback, and meditation. Find time to relax each day.     My support system    Clinic Contact:  Phone number:    Contact 1:  Phone number:    Contact 2:  Phone number:    Mu-ism/:  Phone number:    Therapist:  Phone number:    Local crisis center:    Phone number:    Other community support:  Phone number:

## 2018-03-12 NOTE — PROGRESS NOTES
SUBJECTIVE:   Elaina Valenzuela is a 57 year old male who presents to clinic today for the following health issues:      Patient presents with:  Back Pain: lower back pain, have back surgery in 2010, would like to discuss about back surgery again  Swelling: in feet x ongoing for 2 months- swelling comes and goes - possible gout  Numbness: in legs since 2010             Problem list and histories reviewed & adjusted, as indicated.  Additional history: as documented    Patient Active Problem List   Diagnosis     Chronic low back pain     Left leg weakness     CARDIOVASCULAR SCREENING; LDL GOAL LESS THAN 130     Urinary retention     Neurogenic bladder     Moderate major depression (H)     Mild intermittent asthma without complication     Idiopathic peripheral neuropathy     Erectile dysfunction, unspecified erectile dysfunction type     Benign essential hypertension     Obesity due to excess calories, unspecified obesity severity     Past Surgical History:   Procedure Laterality Date     C NONSPECIFIC PROCEDURE      Rt. 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       Social History   Substance Use Topics     Smoking status: Never Smoker     Smokeless tobacco: Never Used     Alcohol use Yes      Comment: rare     Family History   Problem Relation Age of Onset     Hypertension Mother          Current Outpatient Prescriptions   Medication Sig Dispense Refill     amitriptyline (ELAVIL) 100 MG tablet TAKE 1 TABLET BY MOUTH  EVERY DAY AT BEDTIME 90 tablet 3     hydrochlorothiazide (HYDRODIURIL) 25 MG tablet Take 1 tablet (25 mg) by mouth every morning 90 tablet 3     albuterol (PROAIR HFA/PROVENTIL HFA/VENTOLIN HFA) 108 (90 BASE) MCG/ACT Inhaler Inhale 2 puffs into the lungs every 6 hours as needed for shortness of breath / dyspnea 1 Inhaler 5     citalopram (CELEXA) 10 MG tablet Take 1 tablet (10 mg) by mouth daily 90 tablet 4     albuterol (2.5 MG/3ML) 0.083% neb solution Take 1 vial (2.5 mg) by nebulization every 6 hours as needed for shortness of breath / dyspnea or wheezing 60 vial 6     gabapentin (NEURONTIN) 300 MG capsule Take 2 in the morning, 2 in the afternoon,  and 2 at night 540 capsule 4     metoprolol (LOPRESSOR) 100 MG tablet Take 1 tablet (100 mg) by mouth 2 times daily 180 tablet 3     naproxen (NAPROSYN) 500 MG tablet Take 1 tablet (500 mg) by mouth 2 times daily as needed for moderate pain 180 tablet 5     order for DME Equipment being ordered: Nebulizer 1 each 0     TYLENOL EXTRA STRENGTH 500 MG PO TABS  2 tablets as needed       BENEFIBER DRINK MIX OR POWD 2 teaspoons w/water       [DISCONTINUED] amitriptyline (ELAVIL) 100 MG tablet TAKE 1 TABLET BY MOUTH EVERY DAY AT BEDTIME 90 tablet 0     [DISCONTINUED] gabapentin (NEURONTIN) 300 MG capsule Take 2 in the morning and 2 at night 360 capsule 4     [DISCONTINUED] albuterol (2.5 MG/3ML) 0.083% neb solution Take 1 vial (2.5 mg) by nebulization every 6 hours as needed for shortness of breath / dyspnea or wheezing 60 vial 6     [DISCONTINUED] hydrochlorothiazide (HYDRODIURIL) 25 MG tablet Take 1 tablet (25 mg) by mouth every morning 90 tablet 3     [DISCONTINUED] albuterol (PROAIR HFA/PROVENTIL HFA/VENTOLIN HFA) 108 (90 BASE) MCG/ACT Inhaler Inhale 2 puffs into the lungs every 6 hours as needed for shortness of breath / dyspnea 1 Inhaler 5     [DISCONTINUED] citalopram (CELEXA) 10 MG tablet Take 1 tablet (10 mg) by mouth daily 90 tablet 4     Allergies   Allergen  Reactions     Penicillins Rash     BP Readings from Last 3 Encounters:   03/12/18 122/60   10/10/17 124/78   12/22/16 134/80    Wt Readings from Last 3 Encounters:   03/12/18 179 lb (81.2 kg)   10/10/17 193 lb (87.5 kg)   12/22/16 196 lb (88.9 kg)                  Labs reviewed in EPIC    Reviewed and updated as needed this visit by clinical staff       Reviewed and updated as needed this visit by Provider         LETY:  This 57 year old male is here today with his son. He had L4-5 lumbar laminectomy 3/19/10 due to herniated disc. Unfortunately, he never regained strength in his left left and he was left withh a neurogenic bladder. He also had tumor removed from his thoracic spine 3/23/10. He has not been able to work at all since his surgeries. His wife works full time and then comes home and takes care of him. He has an adult son who cares for him during the day and then works an evening shift. Patient is no longer able to walk with a cane. He now uses his walker all the time, but he has a great fear of falling as he feels his knees are very weak. He has a lot of back pain if he sits any length of time. Worries that he may need more back surgery. He feels the best when he is lying flat in bed. He has a few steps coming into his house and then the rest of the house is all one level. He leans on his son as they carefully go up those few steps from the garage. Patient rarely leaves the house anymore. Very difficult to get him in and out of the house and the car. He wonders if he can increase his gabapentin to 600 mg TID. BID just doesn't hold him from morning to night. It helps with his neuropathy. He takes tylenol 500 mg regularly as well as naprosyn BID for his pain. His liver function tests and kidney tests were normal 10/2017. He has lost 14 pounds since last October. Has noticed swelling of his feet. Someone suggested he could have gout.  All other review of systems are negative  Personal, family, and social  "history reviewed with patient and revised.         OBJECTIVE:     /60  Pulse 68  Temp 97  F (36.1  C) (Oral)  Resp 20  Ht 5' 5\" (1.651 m)  Wt 179 lb (81.2 kg)  SpO2 95%  BMI 29.79 kg/m2  Body mass index is 29.79 kg/(m^2).  GENERAL: healthy, alert but has an anxious speech. He is very frustrated with the weakness of his legs and his disability   NECK: no adenopathy, no asymmetry, masses, or scars and thyroid normal to palpation  RESP: lungs clear to auscultation - no rales, rhonchi or wheezes  CV: regular rate and rhythm, normal S1 S2, no S3 or S4, no murmur, click or rub, no peripheral edema and peripheral pulses strong  ABDOMEN: soft, less truncal obesity   MS: his legs are very weak. Needs to use his hands to get his left leg up onto the wheelchair pedal. Right leg needs just a little assistance with his arms to get leg onto pedal. He was able to stand up for me while holding on to his wheelchair. I did not have him take any steps with his walker as I did not have a safety belt. I wonder if he would benefit from an AFO in his left shoe. Also wonder if he would meet the criteria for a motorized chair so he can get out in the neighborhood and get some fresh air and sunshine in the summer. It may help relieve some of the care giving that his son and wife have to do.   He has mild dependent edema of his feet, which can be expected since his feet are hanging down most of the day and not moving.     Diagnostic Test Results:  none     ASSESSMENT/PLAN:              1. Chronic low back pain, unspecified back pain laterality, with sciatica presence unspecified  As above   - amitriptyline (ELAVIL) 100 MG tablet; TAKE 1 TABLET BY MOUTH EVERY DAY AT BEDTIME  Dispense: 90 tablet; Refill: 3  - NEUROLOGY ADULT REFERRAL  - CT Lumbar Spine w/o Contrast; Future  - [facility] REFERRAL    2. Major depressive disorder, recurrent episode, moderate (H)  PHQ-9 = 17  - amitriptyline (ELAVIL) 100 MG tablet; " TAKE 1 TABLET BY MOUTH EVERY DAY AT BEDTIME  Dispense: 90 tablet; Refill: 3  - citalopram (CELEXA) 10 MG tablet; Take 1 tablet (10 mg) by mouth daily  Dispense: 90 tablet; Refill: 4    3. Essential hypertension with goal blood pressure less than 140/90  Good control   - hydrochlorothiazide (HYDRODIURIL) 25 MG tablet; Take 1 tablet (25 mg) by mouth every morning  Dispense: 90 tablet; Refill: 3    4. Mild intermittent asthma with acute exacerbation  ACT = 19  - albuterol (PROAIR HFA/PROVENTIL HFA/VENTOLIN HFA) 108 (90 BASE) MCG/ACT Inhaler; Inhale 2 puffs into the lungs every 6 hours as needed for shortness of breath / dyspnea  Dispense: 1 Inhaler; Refill: 5  - albuterol (2.5 MG/3ML) 0.083% neb solution; Take 1 vial (2.5 mg) by nebulization every 6 hours as needed for shortness of breath / dyspnea or wheezing  Dispense: 60 vial; Refill: 6    5. Idiopathic peripheral neuropathy  As above, OK to increase gabapentin   - gabapentin (NEURONTIN) 300 MG capsule; Take 2 in the morning, 2 in the afternoon,  and 2 at night  Dispense: 540 capsule; Refill: 4  - [facility] REFERRAL    6. Leg weakness, bilateral  As above, need thorough evaluation of his strengths and weaknesses and risk for falls and adaptive equipment   - [facility]RACTIC REFERRAL    Return to clinic 6 months before the snow flies.     OSCAR RODRIGUEZ MD  [facility]

## 2018-03-12 NOTE — PATIENT INSTRUCTIONS
[facility]    If you have any questions regarding to your visit please contact your care team:       Team Purple:   Clinic Hours Telephone Number   Dr. Libby Escalante   7am-7pm  Monday - Thursday   7am-5pm  Fridays  [phone]  (Appointment scheduling available 24/7)    Questions about your Visit?   Team Line:  [phone]   Urgent Care - [place] and [facility] - 11am-9pm Monday-Friday Saturday-Sunday- 9am-5pm   [place] - 5pm-9pm Monday-Friday Saturday-Sunday- 9am-5pm  [phone] - [facility]  [phone] - [place]       What options do I have for visits at the clinic other than the traditional office visit?  To expand how we care for you, many of our providers are utilizing electronic visits (e-visits) and telephone visits, when medically appropriate, for interactions with their patients rather than a visit in the clinic.   We also offer nurse visits for many medical concerns. Just like any other service, we will bill your insurance company for this type of visit based on time spent on the phone with your provider. Not all insurance companies cover these visits. Please check with your medical insurance if this type of visit is covered. You will be responsible for any charges that are not paid by your insurance.      E-visits via [employer]:  generally incur a $35.00 fee.  Telephone visits:  Time spent on the phone: *charged based on time that is spent on the phone in increments of 10 minutes. Estimated cost:   5-10 mins $30.00   11-20 mins. $59.00   21-30 mins. $85.00     Use [employer] (secure email communication and access to your chart) to send your primary care provider a message or make an appointment. Ask someone on your Team how to sign up for [employer].  For a Price Quote for your services, please call our Consumer Price Line at [phone].  As always, Thank you for trusting us with your health care needs!

## 2018-03-12 NOTE — MR AVS SNAPSHOT
After Visit Summary   3/12/2018    Elaina Valenzuela    MRN: [id number]           Patient Information     Date Of Birth          1960        Visit Information        Provider Department      3/12/2018 10:30 AM Libby Baker MD [facility]        Today's Diagnoses     Chronic low back pain, unspecified back pain laterality, with sciatica presence unspecified    -  1    Major depressive disorder, recurrent episode, moderate (H)        Essential hypertension with goal blood pressure less than 140/90        Mild intermittent asthma with acute exacerbation        Idiopathic peripheral neuropathy        Leg weakness, bilateral          Care Instructions    [facility]    If you have any questions regarding to your visit please contact your care team:       Team Purple:   Clinic Hours Telephone Number   Dr. Libby Escalante   7am-7pm  Monday - Thursday   7am-5pm  Fridays  [phone]  (Appointment scheduling available 24/7)    Questions about your Visit?   Team Line:  [phone]   Urgent Care - [place] and [place] - 11am-9pm Monday-Friday Saturday-Sunday- 9am-5pm   [place] - 5pm-9pm Monday-Friday Saturday-Sunday- 9am-5pm  [phone] - [place]   [phone] - [place]       What options do I have for visits at the clinic other than the traditional office visit?  To expand how we care for you, many of our providers are utilizing electronic visits (e-visits) and telephone visits, when medically appropriate, for interactions with their patients rather than a visit in the clinic.   We also offer nurse visits for many medical concerns. Just like any other service, we will bill your insurance company for this type of visit based on time spent on the phone with your provider. Not all insurance companies cover these visits. Please check with your medical insurance if this type of visit is  covered. You will be responsible for any charges that are not paid by your insurance.      E-visits via Metrekare:  generally incur a $35.00 fee.  Telephone visits:  Time spent on the phone: *charged based on time that is spent on the phone in increments of 10 minutes. Estimated cost:   5-10 mins $30.00   11-20 mins. $59.00   21-30 mins. $85.00     Use Dibspacet (secure email communication and access to your chart) to send your primary care provider a message or make an appointment. Ask someone on your Team how to sign up for Metrekare.  For a Price Quote for your services, please call our Consumer Price Line at [phone].  As always, Thank you for trusting us with your health care needs!              Follow-ups after your visit        Additional Services     [facility] PT, HAND, AND CHIROPRACTIC REFERRAL       === This order will print in the [facility] Scheduling  Office ===    Physical therapy, hand therapy and chiropractic care are available through:    [place] [facility]  [facility]    Call one easy number to schedule at any of the above locations:  [phone].    Your provider has referred you to Physical Therapy at [facility] or [facility]    Indication/Reason for Referral: Low Back Pain  Onset of Illness:  A few years ago   Therapy Orders:  Evaluate and Treat  Special Programs:  Evaluate for motorized wheelchair   Special Request:  Evaluate for motorized wheelchair     Additional Comments for the therapist or chiropractor:   Neurology referral and EMG pending     Please be aware that coverage of these services is subject to the terms and limitations of your health insurance plan.  Call member services at your health plan with any benefit or coverage questions.      Please bring the following to your appointment:    >>   Your personal calendar for scheduling future appointments  >>   Comfortable clothing            NEUROLOGY ADULT REFERRAL       Your provider has referred you  to: [facility]: [facility] [phone]   [email or web address]     Needs EMG of legs also     Please be aware that coverage of these services is subject to the terms and limitations of your health insurance plan.  Call member services at your health plan with any benefit or coverage questions.      Please bring the following to your appointment:    >>   Any x-rays, CTs or MRIs which have been performed.  Contact the facility where they were done to arrange for  prior to your scheduled appointment.  Any new CT, MRI or other procedures ordered by your specialist must be performed at a [place] facility or coordinated by your clinic's referral office.    >>   List of current medications   >>   This referral request   >>   Any documents/labs given to you for this referral                  Future tests that were ordered for you today     Open Future Orders        Priority Expected Expires Ordered    CT Lumbar Spine w/o Contrast Routine  3/12/2019 3/12/2018            Who to contact     If you have questions or need follow up information about today's clinic visit or your schedule please contact [facility] directly at [phone].  Normal or non-critical lab and imaging results will be communicated to you by MyChart, letter or phone within 4 business days after the clinic has received the results. If you do not hear from us within 7 days, please contact the clinic through Conduithart or phone. If you have a critical or abnormal lab result, we will notify you by phone as soon as possible.  Submit refill requests through Zong or call your pharmacy and they will forward the refill request to us. Please allow 3 business days for your refill to be completed.          Additional Information About Your Visit        Zong Information     Zong lets you send messages to your doctor, view your test results, renew your  "prescriptions, schedule appointments and more. To sign up, go to [email or web address] . Click on \"Log in\" on the left side of the screen, which will take you to the Welcome page. Then click on \"Sign up Now\" on the right side of the page.     You will be asked to enter the access code listed below, as well as some personal information. Please follow the directions to create your username and password.     Your access code is: [email or web address]  Expires: 6/10/2018 11:21 AM     Your access code will  in 90 days. If you need help or a new code, please call your [place] clinic or [phone].        Care EveryWhere ID     This is your Care EveryWhere ID. This could be used by other organizations to access your [place] medical records  [id number]        Your Vitals Were     Pulse Temperature Respirations Height Pulse Oximetry BMI (Body Mass Index)    68 97  F (36.1  C) (Oral) 20 5' 5\" (1.651 m) 95% 29.79 kg/m2       Blood Pressure from Last 3 Encounters:   18 122/60   10/10/17 124/78   16 134/80    Weight from Last 3 Encounters:   18 179 lb (81.2 kg)   10/10/17 193 lb (87.5 kg)   16 196 lb (88.9 kg)              We Performed the Following     DEPRESSION ACTION PLAN (DAP)     [facility] PT, HAND, AND CHIROPRACTIC REFERRAL     NEUROLOGY ADULT REFERRAL          Today's Medication Changes          These changes are accurate as of 3/12/18 11:21 AM.  If you have any questions, ask your nurse or doctor.               These medicines have changed or have updated prescriptions.        Dose/Directions    amitriptyline 100 MG tablet   Commonly known as:  ELAVIL   This may have changed:  See the new instructions.   Used for:  Chronic low back pain, unspecified back pain laterality, with sciatica presence unspecified, Major depressive disorder, recurrent episode, moderate (H)   Changed by:  Libby Baker MD        TAKE 1 TABLET BY MOUTH EVERY DAY AT BEDTIME   Quantity:  90 tablet   Refills:  3       " gabapentin 300 MG capsule   Commonly known as:  NEURONTIN   This may have changed:  additional instructions   Used for:  Idiopathic peripheral neuropathy   Changed by:  Libby Baker MD        Take 2 in the morning, 2 in the afternoon,  and 2 at night   Quantity:  540 capsule   Refills:  4            Where to get your medicines      [facility] [address] [facility] [address]  [address] [facility] [address]     Phone:  [phone]     albuterol (2.5 MG/3ML) 0.083% neb solution    albuterol 108 (90 BASE) MCG/ACT Inhaler    amitriptyline 100 MG tablet    citalopram 10 MG tablet    gabapentin 300 MG capsule    hydrochlorothiazide 25 MG tablet                Primary Care Provider Office Phone # Fax #    Libby Baker [phone]       [facility] [address] [facility] [address]        Equal Access to Services     [facility] : Hadii aad ku hadasho Soomaali, waaxda luqadaha, qaybta kaalmada adeegyada, waxay idiin hayeduardon joe snyder . So [facility] [phone].    ATENCIÓN: Si habla español, tiene a davis disposición servicios gratuitos de asistencia lingüística. [facility] [phone].    We comply with applicable federal civil rights laws and [place] laws. We do not discriminate on the basis of race, color, national origin, age, disability, sex, sexual orientation, or gender identity.            Thank you!     Thank you for choosing [facility]  for your care. Our goal is always to provide you with excellent care. Hearing back from our patients is one way we can continue to improve our services. Please take a few minutes to complete the written survey that you may receive in the mail after your visit with us. Thank you!             Your Updated Medication List - Protect others around you: Learn how to safely use, store and throw away your  medicines at www.disposemymeds.org.          This list is accurate as of 3/12/18 11:21 AM.  Always use your most recent med list.                   Brand Name Dispense Instructions for use Diagnosis    * albuterol 108 (90 BASE) MCG/ACT Inhaler    PROAIR HFA/PROVENTIL HFA/VENTOLIN HFA    1 Inhaler    Inhale 2 puffs into the lungs every 6 hours as needed for shortness of breath / dyspnea    Mild intermittent asthma with acute exacerbation       * albuterol (2.5 MG/3ML) 0.083% neb solution     60 vial    Take 1 vial (2.5 mg) by nebulization every 6 hours as needed for shortness of breath / dyspnea or wheezing    Mild intermittent asthma with acute exacerbation       amitriptyline 100 MG tablet    ELAVIL    90 tablet    TAKE 1 TABLET BY MOUTH EVERY DAY AT BEDTIME    Chronic low back pain, unspecified back pain laterality, with sciatica presence unspecified, Major depressive disorder, recurrent episode, moderate (H)       BENEFIBER DRINK MIX Powd      2 teaspoons w/water        citalopram 10 MG tablet    celeXA    90 tablet    Take 1 tablet (10 mg) by mouth daily    Major depressive disorder, recurrent episode, moderate (H)       gabapentin 300 MG capsule    NEURONTIN    540 capsule    Take 2 in the morning, 2 in the afternoon,  and 2 at night    Idiopathic peripheral neuropathy       hydrochlorothiazide 25 MG tablet    HYDRODIURIL    90 tablet    Take 1 tablet (25 mg) by mouth every morning    Essential hypertension with goal blood pressure less than 140/90       metoprolol tartrate 100 MG tablet    LOPRESSOR    180 tablet    Take 1 tablet (100 mg) by mouth 2 times daily    Essential hypertension with goal blood pressure less than 140/90       naproxen 500 MG tablet    NAPROSYN    180 tablet    Take 1 tablet (500 mg) by mouth 2 times daily as needed for moderate pain    Chronic low back pain, unspecified back pain laterality, with sciatica presence unspecified       order for DME     1 each    Equipment being ordered:  Nebulizer    Mild intermittent asthma with acute exacerbation       TYLENOL 500 MG tablet   Generic drug:  acetaminophen      2 tablets as needed        * Notice:  This list has 2 medication(s) that are the same as other medications prescribed for you. Read the directions carefully, and ask your doctor or other care provider to review them with you.

## 2018-03-13 ENCOUNTER — THERAPY VISIT (OUTPATIENT)
Dept: PHYSICAL THERAPY | Facility: CLINIC | Age: 58
End: 2018-03-13
Payer: COMMERCIAL

## 2018-03-13 ENCOUNTER — TELEPHONE (OUTPATIENT)
Dept: FAMILY MEDICINE | Facility: CLINIC | Age: 58
End: 2018-03-13

## 2018-03-13 DIAGNOSIS — G60.9 IDIOPATHIC PERIPHERAL NEUROPATHY: ICD-10-CM

## 2018-03-13 DIAGNOSIS — R29.898 LEFT LEG WEAKNESS: Primary | ICD-10-CM

## 2018-03-13 PROCEDURE — G8978 MOBILITY CURRENT STATUS: HCPCS | Mod: GP | Performed by: PHYSICAL THERAPIST

## 2018-03-13 PROCEDURE — G8979 MOBILITY GOAL STATUS: HCPCS | Mod: GP | Performed by: PHYSICAL THERAPIST

## 2018-03-13 PROCEDURE — 97530 THERAPEUTIC ACTIVITIES: CPT | Mod: GP | Performed by: PHYSICAL THERAPIST

## 2018-03-13 PROCEDURE — G8980 MOBILITY D/C STATUS: HCPCS | Mod: GP | Performed by: PHYSICAL THERAPIST

## 2018-03-13 PROCEDURE — 97163 PT EVAL HIGH COMPLEX 45 MIN: CPT | Mod: GP | Performed by: PHYSICAL THERAPIST

## 2018-03-13 ASSESSMENT — PATIENT HEALTH QUESTIONNAIRE - PHQ9: SUM OF ALL RESPONSES TO PHQ QUESTIONS 1-9: 17

## 2018-03-13 ASSESSMENT — ASTHMA QUESTIONNAIRES: ACT_TOTALSCORE: 19

## 2018-03-13 NOTE — MR AVS SNAPSHOT
"              After Visit Summary   3/13/2018    Elaina Valenzuela    MRN: [id number]           Patient Information     Date Of Birth          1960        Visit Information        Provider Department      3/13/2018 11:30 AM Erica Garcia PT [facility]        Today's Diagnoses     Left leg weakness    -  1    Idiopathic peripheral neuropathy           Follow-ups after your visit        Future tests that were ordered for you today     Open Future Orders        Priority Expected Expires Ordered    CT Lumbar Spine w/o Contrast Routine  3/12/2019 3/12/2018            Who to contact     If you have questions or need follow up information about today's clinic visit or your schedule please contact [facility] directly at [phone].  Normal or non-critical lab and imaging results will be communicated to you by Futuredermhart, letter or phone within 4 business days after the clinic has received the results. If you do not hear from us within 7 days, please contact the clinic through Futuredermhart or phone. If you have a critical or abnormal lab result, we will notify you by phone as soon as possible.  Submit refill requests through Tribe or call your pharmacy and they will forward the refill request to us. Please allow 3 business days for your refill to be completed.          Additional Information About Your Visit        F[employer] Information     Tribe lets you send messages to your doctor, view your test results, renew your prescriptions, schedule appointments and more. To sign up, go to [email or web address] . Click on \"Log in\" on the left side of the screen, which will take you to the Welcome page. Then click on \"Sign up Now\" on the right side of the page.     You will be asked to enter the access code listed below, as well as some personal information. Please follow the directions to create your username and password.     Your access code is: " [email or web address]  Expires: 6/10/2018 11:21 AM     Your access code will  in 90 days. If you need help or a new code, please call your [facility] or [phone].        Care EveryWhere ID     This is your Care EveryWhere ID. This could be used by other organizations to access your [place] medical records  [id number]         Blood Pressure from Last 3 Encounters:   18 122/60   10/10/17 124/78   16 134/80    Weight from Last 3 Encounters:   18 81.2 kg (179 lb)   10/10/17 87.5 kg (193 lb)   16 88.9 kg (196 lb)              We Performed the Following     HC PT EVAL, HIGH COMPLEXITY     [facility] INITIAL EVAL REPORT     THERAPEUTIC ACTIVITIES        Primary Care Provider Office Phone # Fax #    Libby Baker [phone]       [address] [address]        Equal Access to Services     ANGÉLICA COHEN AH: Hadii aad ku hadasho Soomaali, waaxda luqadaha, qaybta kaalmada adeegyada, waxay wilbur haycinda snyder . So [facility] [phone].    ATENCIÓN: Si habla español, tiene a davis disposición servicios gratuitos de asistencia lingüística. Llame al [phone].    We comply with applicable federal civil rights laws and [place] laws. We do not discriminate on the basis of race, color, national origin, age, disability, sex, sexual orientation, or gender identity.            Thank you!     Thank you for choosing [facility]  for your care. Our goal is always to provide you with excellent care. Hearing back from our patients is one way we can continue to improve our services. Please take a few minutes to complete the written survey that you may receive in the mail after your visit with us. Thank you!             Your Updated Medication List - Protect others around you: Learn how to safely use, store and throw away your medicines at www.disposemymeds.org.          This list is accurate as of  3/13/18 12:51 PM.  Always use your most recent med list.                   Brand Name Dispense Instructions for use Diagnosis    * albuterol 108 (90 BASE) MCG/ACT Inhaler    PROAIR HFA/PROVENTIL HFA/VENTOLIN HFA    1 Inhaler    Inhale 2 puffs into the lungs every 6 hours as needed for shortness of breath / dyspnea    Mild intermittent asthma with acute exacerbation       * albuterol (2.5 MG/3ML) 0.083% neb solution     60 vial    Take 1 vial (2.5 mg) by nebulization every 6 hours as needed for shortness of breath / dyspnea or wheezing    Mild intermittent asthma with acute exacerbation       amitriptyline 100 MG tablet    ELAVIL    90 tablet    TAKE 1 TABLET BY MOUTH EVERY DAY AT BEDTIME    Chronic low back pain, unspecified back pain laterality, with sciatica presence unspecified, Major depressive disorder, recurrent episode, moderate (H)       BENEFIBER DRINK MIX Powd      2 teaspoons w/water        citalopram 10 MG tablet    celeXA    90 tablet    Take 1 tablet (10 mg) by mouth daily    Major depressive disorder, recurrent episode, moderate (H)       gabapentin 300 MG capsule    NEURONTIN    540 capsule    Take 2 in the morning, 2 in the afternoon,  and 2 at night    Idiopathic peripheral neuropathy       hydrochlorothiazide 25 MG tablet    HYDRODIURIL    90 tablet    Take 1 tablet (25 mg) by mouth every morning    Essential hypertension with goal blood pressure less than 140/90       metoprolol tartrate 100 MG tablet    LOPRESSOR    180 tablet    Take 1 tablet (100 mg) by mouth 2 times daily    Essential hypertension with goal blood pressure less than 140/90       naproxen 500 MG tablet    NAPROSYN    180 tablet    Take 1 tablet (500 mg) by mouth 2 times daily as needed for moderate pain    Chronic low back pain, unspecified back pain laterality, with sciatica presence unspecified       order for DME     1 each    Equipment being ordered: Nebulizer    Mild intermittent asthma with acute exacerbation        TYLENOL 500 MG tablet   Generic drug:  acetaminophen      2 tablets as needed        * Notice:  This list has 2 medication(s) that are the same as other medications prescribed for you. Read the directions carefully, and ask your doctor or other care provider to review them with you.

## 2018-03-13 NOTE — PROGRESS NOTES
[facility] Initial Evaluation      Subjective:  Patient is a 57 year old male presenting with rehab right knee hpi. The history is provided by the patient. The history is limited by a language barrier. No  was used.   Affected Side: bilateral legs.  Condition occurred with:  Other reason (after surgery).  Condition occurred: for unknown reasons.  This is a chronic condition  After having his lumbar fusion in 2010, he has had more weakness in his L leg, more than before the surgery, and his pain in his LB is worse.  He has numbness in both legs constantly.  He has consulted with his PCP for this repeatedly, his last consult was on 3/12/18 and he was referred to PT to evaluate the need for an AFO on his L leg and whether or not he should have a power wheelchair..    Patient reports pain:  Anterior and lower leg (from upper thighs down to feet and in lumbar spine).    Pain is described as sharp and is constant and reported as 8/10.  Associated symptoms:  Buckling/giving out, tingling, numbness and loss of strength. Pain is the same all the time.  Symptoms are exacerbated by walking, standing, bending/squatting, transfers, weight bearing, descending stairs, ascending stairs and activity and relieved by rest and analgesics (mostly lays down).  Since onset symptoms are gradually worsening.  Special testing: nothing recent.  Previous treatment includes physical therapy.  There was mild improvement following previous treatment.  General health as reported by patient is good.  Pertinent medical history includes:  Osteoarthritis, high blood pressure and asthma.  Medical allergies: yes (in Epic).  Other surgeries include:  Orthopedic surgery (lumbar, thoracic).  Current medications:  Pain medication and high blood pressure medication (see list in Epic).  Current occupation is none.    Employment tasks: doesn't do anything at home, wife helps him bathe and dress.    Barriers include:   Requires assistance with ADL's.    Red flags:  Progressive neurological deficits, foot drop, significant weakness and bilateral numbness.                        Objective:        Flexibility/Screens:       Lower Extremity:  Decreased left lower extremity flexibility:Hip Flexors; Hamstrings; Gastroc and Soleus    Decreased right lower extremity flexibility:  Hip Flexors; Hamstrings; Gastroc and Soleus          Physical Exam        General Evaluation:      Gross Strength:              Lower Extremity:   Significant findings:  Hip flex's 3+/5 bilat'ly, quads 4+/5 bilat'ly, hamstrings 4-/5 bilat'ly, ankle DF 4-/5 bilat'ly, ankle PF R 4/5 on L only 3/5.  Core:   Significant findings:  Weak, unable to perform even one bridge        Sensation:  not assessed      Edema:  Edema wnl general: moderate to signif edema in B lower legs and feet.    Integumentary/Inspection:  not assessed    Reflexes:  not assessed    Balance:  Balance wnl general: not assessed due to signif weakness just with standing and walking.            Posture:    Standing:   Thoracic kyphosis increased and lordosis decreased  Sitting:  Rounded shoulders and forward head  Functional Assessment:  not assessed        Endurance Assessment:  not assessed          Gait:    Significant findings:  Lacks eccentric DF control on L, poor push off bilat'ly, L knee locks back into ext w/stance phase, poor control at B hips                                     ROS    Assessment/Plan:    Patient is a 57 year old male with B leg weakness complaints.    Patient has the following significant findings with corresponding treatment plan.                Diagnosis 1:  B leg weakness w/neurological deficits after lumbar fusion  Pain -  self management, education and home program  Decreased ROM/flexibility - home program and rehab center  Decreased strength - home program and rehab center  Impaired balance - adaptive equipment/assistive device and rehab center  Impaired gait -  assistive devices and rehab center  Impaired muscle performance - home program and rehab center  Decreased function - home program and rehab center  Instability -  Splinting/Taping/Bracing/Orthotic and rehab center    Therapy Evaluation Codes:   1) History comprised of:   Personal factors that impact the plan of care:      Education, Language, Past/current experiences and Time since onset of symptoms.    Comorbidity factors that impact the plan of care are:      High blood pressure, Numbness/tingling, Osteoarthritis, Pain at night/rest and Weakness.     Medications impacting care: High blood pressure and Pain.  2) Examination of Body Systems comprised of:   Body structures and functions that impact the plan of care:      Ankle, Hip, Knee, Lumbar spine and Pelvis.   Activity limitations that impact the plan of care are:      Bathing, Bending, Driving, Dressing, Lifting, Sitting, Squatting/kneeling, Stairs, Standing and Walking.  3) Clinical presentation characteristics are:   Unstable/Unpredictable.  4) Decision-Making    High complexity using standardized patient assessment instrument and/or measureable assessment of functional outcome.  Cumulative Therapy Evaluation is: High complexity.    Previous and current functional limitations:  (See Goal Flow Sheet for this information)    Short term and Long term goals: (See Goal Flow Sheet for this information)     Communication ability:  Patient is unable to clearly communicate and follow directions.  They will require assistance to perform a home exercise program.  Moderate language barriers present during today's session.  Treatment Explanation - The following has been discussed with the patient:   RX ordered/plan of care  Anticipated outcomes  Possible risks and side effects  PT intervention is not appropriate at this time. and patient would benefit more from doing therapy at a rehab center that is better equipped to work with his neurological deficits, and use  equipment that will aid in gait.   Rehab potential is poor in this setting, fair if done at a rehab center.    Frequency:  Today only  Duration:  Today only  Discharge Plan:  Discharge today, patient and his son were advised to look into scheduling at a rehab facility (son has a place in mind), but to also make sure to schedule the neurology consult and get the CT scan done.     Please refer to the daily flowsheet for treatment today, total treatment time and time spent performing 1:1 timed codes.

## 2018-03-13 NOTE — Clinical Note
Elaina Mackey would benefit from a wheeled walker with brakes and bench seat, basket under seat as well as an AFO for his L lower leg/foot.  Could you please place orders for DME in [facility] so his son can get the walker?  As for the AFO, he will need a referral to an orthotist to custom make that for him.  I did recommend that they go to a rehab facility to work on gait and strengthening as we do not have the proper equipment (parallel bars, suspension device to unload wt, and much more).  I also strongly recommended that they get the CT scan scheduled and the consult with the neurologist, pointing out the phone number to call on the referral you placed. I hope I was able to help in some way with your patient. Cordially, Erica Garcia, MPT

## 2018-03-13 NOTE — TELEPHONE ENCOUNTER
Message  Received: Today       Libby Baker MD [facility] Rn Triage Pool                     Call patient. Offer to mail order for DME and advise him on how to use the prescription.     LIBBY BAKER M.D.

## 2018-03-13 NOTE — Clinical Note
Call patient. Offer to mail order for DME and advise him on how to use the prescription.   OSCAR RODRIGUEZ M.D.

## 2018-03-13 NOTE — TELEPHONE ENCOUNTER
Patient notified that DME order is ready for .  Patient did discuss the walker at therapy visit and knows how to use.  Patient will have his son pick this up and bring to store.  Sons name is Soumya Valenzuela.  Prescription brought to .   Anna Lott RN

## 2018-03-14 ENCOUNTER — RADIANT APPOINTMENT (OUTPATIENT)
Dept: CT IMAGING | Facility: CLINIC | Age: 58
End: 2018-03-14
Attending: FAMILY MEDICINE
Payer: COMMERCIAL

## 2018-03-14 DIAGNOSIS — M54.5 CHRONIC LOW BACK PAIN, UNSPECIFIED BACK PAIN LATERALITY, WITH SCIATICA PRESENCE UNSPECIFIED: ICD-10-CM

## 2018-03-14 DIAGNOSIS — G89.29 CHRONIC LOW BACK PAIN, UNSPECIFIED BACK PAIN LATERALITY, WITH SCIATICA PRESENCE UNSPECIFIED: ICD-10-CM

## 2018-03-14 PROCEDURE — 72131 CT LUMBAR SPINE W/O DYE: CPT | Mod: TC

## 2018-03-19 ENCOUNTER — TELEPHONE (OUTPATIENT)
Dept: FAMILY MEDICINE | Facility: CLINIC | Age: 58
End: 2018-03-19

## 2018-03-19 NOTE — TELEPHONE ENCOUNTER
Pt called RN hotline. Spelled the medication and advised that hydrochlorothiazide was sent to the pharmacy on 3/12 and is ready to be picked up.    Chantal Ortiz RN  [facility] [place]

## 2018-03-19 NOTE — TELEPHONE ENCOUNTER
Reason for Call:  Other call back    Detailed comments: Patient calling states pharmacy does not have medication for his leg swelling. He did not know the name of the medication but said it was for swelling in his leg. Please call to [facility] [id number]  [place] - [address] AT [facility]. Please call patient when done.     Phone Number Patient can be reached at: Home number on file [phone] (home)    Best Time: Any    Can we leave a detailed message on this number? YES    Call taken on 3/19/2018 at 11:36 AM by Mirta Schneider

## 2018-03-19 NOTE — TELEPHONE ENCOUNTER
Left detailed message advising patient that prescription for HCTZ was sen on 3/12/18 and he can call the pharmacy for a refill.  Advised patient to call RN hotline back if any questions.   Anna Lott RN

## 2018-03-20 ENCOUNTER — TELEPHONE (OUTPATIENT)
Dept: FAMILY MEDICINE | Facility: CLINIC | Age: 58
End: 2018-03-20

## 2018-03-20 NOTE — LETTER
[facility]  [address]  [place] [address]  [phone]    April 20, 2018      Elaina Valenzuela  [facility] [address] [place] [address]      Dear Elaina,     Your clinic record indicates that you are due for an asthma update. We have a survey tool called an ACT (or Asthma Control Test) we use to measure the level of control of your asthma.     Please complete the enclosed questionnaire and mail it back to us in the self-addressed stamped envelope.     If you have questions about this letter please contact your provider.     Sincerely,      Your [facility]  Enclosure

## 2018-03-20 NOTE — TELEPHONE ENCOUNTER
Patient was in to see Dr. Baker and failed their ACT by scoring 19. Please put in the failed ACT workflow for follow-up. Thank you.

## 2018-04-02 ENCOUNTER — TELEPHONE (OUTPATIENT)
Dept: FAMILY MEDICINE | Facility: CLINIC | Age: 58
End: 2018-04-02

## 2018-04-02 NOTE — TELEPHONE ENCOUNTER
Reason for Call:  Other call back    Detailed comments:  [facility] calling to get the P/T order faxed to them. Please fax to [phone]    Phone Number Patient can be reached at: Other phone number:  [phone]    Best Time:  Any     Can we leave a detailed message on this number? YES    Call taken on 4/2/2018 at 8:15 AM by Luz Elena Johnson

## 2018-05-07 NOTE — TELEPHONE ENCOUNTER
Called patient and left VM to call clinic to see if ACT was received/mailed back.  Chantal GARCIA CMA ([facility])

## 2018-05-14 NOTE — TELEPHONE ENCOUNTER
Called patient no answer left message to return call purple team number left.  Thank you   Ashia

## 2018-06-01 ASSESSMENT — ASTHMA QUESTIONNAIRES: ACT_TOTALSCORE: 25

## 2018-07-12 NOTE — PROGRESS NOTES
SUBJECTIVE:   Elaina Valenzuela is a 57 year old male who presents to clinic today for the following health issues:      Patient presents with:  Back Pain: pain comes and goes  Health Maintenance: DUE: Colon cancer screening, PHQ-9  Leg Swellin-3 weeks ago  Other: patient wants a note to go back for thearpy             Problem list and histories reviewed & adjusted, as indicated.  Additional history: as documented    Patient Active Problem List   Diagnosis     Chronic low back pain     CARDIOVASCULAR SCREENING; LDL GOAL LESS THAN 130     Urinary retention     Neurogenic bladder     Moderate major depression (H)     Mild intermittent asthma without complication     Erectile dysfunction, unspecified erectile dysfunction type     Benign essential hypertension     Obesity due to excess calories, unspecified obesity severity     Past Surgical History:   Procedure Laterality Date     C NONSPECIFIC PROCEDURE      Rt. 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       Social History   Substance Use Topics     Smoking status: Never Smoker     Smokeless tobacco: Never Used     Alcohol use Yes      Comment: rare     Family History   Problem Relation Age of Onset     Hypertension Mother          Current Outpatient Prescriptions   Medication Sig Dispense Refill     amitriptyline (ELAVIL) 100 MG tablet TAKE 1 TABLET BY MOUTH EVERY DAY AT BEDTIME 90 tablet 3     BENEFIBER DRINK MIX OR POWD 2 teaspoons w/water       citalopram  (CELEXA) 10 MG tablet Take 1 tablet (10 mg) by mouth daily 90 tablet 4     gabapentin (NEURONTIN) 300 MG capsule Take 2 in the morning, 2 in the afternoon,  and 2 at night 540 capsule 4     hydrochlorothiazide (HYDRODIURIL) 25 MG tablet Take 1 tablet (25 mg) by mouth every morning 90 tablet 3     metoprolol (LOPRESSOR) 100 MG tablet Take 1 tablet (100 mg) by mouth 2 times daily 180 tablet 3     naproxen (NAPROSYN) 500 MG tablet Take 1 tablet (500 mg) by mouth 2 times daily as needed for moderate pain 180 tablet 5     order for DME Equipment being ordered: light weight folding rolling walker with brakes, seat, and a basket under the seat 1 each 0     order for DME Equipment being ordered: Nebulizer 1 each 0     TYLENOL EXTRA STRENGTH 500 MG PO TABS  2 tablets as needed       Allergies   Allergen Reactions     Penicillins Rash     BP Readings from Last 3 Encounters:   07/23/18 120/74   03/12/18 122/60   10/10/17 124/78    Wt Readings from Last 3 Encounters:   03/12/18 179 lb (81.2 kg)   10/10/17 193 lb (87.5 kg)   12/22/16 196 lb (88.9 kg)                  Labs reviewed in EPIC    Reviewed and updated as needed this visit by clinical staff  Tobacco  Allergies  Meds  Med Hx  Surg Hx  Fam Hx  Soc Hx      Reviewed and updated as needed this visit by Provider         ROS:  This 57 year old male is here today to get more physical therapy at [facility] in [place]. He had to stop therapy for about 1 month because he wife couldn't drive him to therapy. Now she has a job where she can take him to therapy in the afternoon. He had laminectomy/discectomy in 2010 for herniated disc and removal of meningial benign tumor. He was left with a neurogenic bladder and leg weakness. He still has a lot of back pain and leg radiculopathy. Cat scan of his lumbar spine revealed L5-S1 degenerative facet joint disease caausing moderate bilateral neural formaninal stenosis. He spends most of his time flat in bed as his pain is less then.  "He is not able to walk and thus requires a wheelchair for mobility. Now his lower legs swell up easily and he wonders if there is medication for that. He is on hydrochlorothiazide 25 mg daily already and it isn't helping. All other review of systems are negative  Personal, family, and social history reviewed with patient and revised.          OBJECTIVE:     /74  Pulse 77  Temp 97.3  F (36.3  C) (Oral)  Resp 20  Ht 5' 5\" (1.651 m)  SpO2 95%  There is no height or weight on file to calculate BMI.  Patient is wheelchair bound.   He has venous stasis of his lower legs.   Heart: normal rate and rhythm with no murmur  Lungs: clear in all fields  Well hydrated  Well nourished  Well groomed  Anxious man  His legs are very weak.     Diagnostic Test Results:  none     ASSESSMENT/PLAN:           1. Chronic bilateral low back pain with bilateral sciatica  As above, he deserves another opinion from a neurosurgeon   - [facility] REFERRAL  - NEUROSURGERY REFERRAL    2. Venous stasis of both lower extremities   discussed wearing compression socks while he is up in chair with legs hanging down. More diuretic will not help this problem.       Return to clinic if no improvement     OSCAR RODRIGUEZ MD  [facility] [place]  "

## 2018-07-23 ENCOUNTER — OFFICE VISIT (OUTPATIENT)
Dept: FAMILY MEDICINE | Facility: CLINIC | Age: 58
End: 2018-07-23
Payer: COMMERCIAL

## 2018-07-23 VITALS
HEART RATE: 77 BPM | TEMPERATURE: 97.3 F | RESPIRATION RATE: 20 BRPM | HEIGHT: 65 IN | DIASTOLIC BLOOD PRESSURE: 74 MMHG | SYSTOLIC BLOOD PRESSURE: 120 MMHG | OXYGEN SATURATION: 95 %

## 2018-07-23 DIAGNOSIS — M54.42 CHRONIC BILATERAL LOW BACK PAIN WITH BILATERAL SCIATICA: Primary | ICD-10-CM

## 2018-07-23 DIAGNOSIS — M54.41 CHRONIC BILATERAL LOW BACK PAIN WITH BILATERAL SCIATICA: Primary | ICD-10-CM

## 2018-07-23 DIAGNOSIS — G89.29 CHRONIC BILATERAL LOW BACK PAIN WITH BILATERAL SCIATICA: Primary | ICD-10-CM

## 2018-07-23 DIAGNOSIS — I87.8 VENOUS STASIS OF BOTH LOWER EXTREMITIES: ICD-10-CM

## 2018-07-23 PROCEDURE — 99213 OFFICE O/P EST LOW 20 MIN: CPT | Performed by: FAMILY MEDICINE

## 2018-07-23 NOTE — PATIENT INSTRUCTIONS
[facility]    If you have any questions regarding to your visit please contact your care team:       Team Purple:   Clinic Hours Telephone Number   Dr. Libby Escalante   7am-7pm  Monday - Thursday   7am-5pm  Fridays  [phone]  (Appointment scheduling available 24/7)    Questions about your recent visit?   Team Line:  [phone]   Urgent Care - [place] and [facility] - 11am-9pm Monday-Friday Saturday-Sunday- 9am-5pm   [place] - 5pm-9pm Monday-Friday Saturday-Sunday- 9am-5pm  [phone] - [place]  [phone] [facility]       What options do I have for a visit other than an office visit? We offer electronic visits (e-visits) and telephone visits, when medically appropriate.  Please check with your medical insurance to see if these types of visits are covered, as you will be responsible for any charges that are not paid by your insurance.      You can use [employer] (secure electronic communication) to access to your chart, send your primary care provider a message, or make an appointment. Ask a team member how to get started.     For a price quote for your services, please call our Consumer Price Line at [phone] or our Imaging Cost estimation line at [phone] (for imaging tests).

## 2018-07-23 NOTE — MR AVS SNAPSHOT
After Visit Summary   7/23/2018    Elaina Valenzuela    MRN: [id number]           Patient Information     Date Of Birth          1960        Visit Information        Provider Department      7/23/2018 3:30 PM Libby Baker MD [facility]        Today's Diagnoses     Chronic bilateral low back pain with bilateral sciatica    -  1      Care Instructions    [facility]    If you have any questions regarding to your visit please contact your care team:       Team Purple:   Clinic Hours Telephone Number   Dr. Libby Escalante   7am-7pm  Monday - Thursday   7am-5pm  Fridays  [phone]  (Appointment scheduling available 24/7)    Questions about your recent visit?   Team Line:  [phone]   Urgent Care - [place] and [facility] - 11am-9pm Monday-Friday Saturday-Sunday- 9am-5pm   Nursery - 5pm-9pm Monday-Friday Saturday-Sunday- 9am-5pm  [phone] - [place]  [phone] [facility]       What options do I have for a visit other than an office visit? We offer electronic visits (e-visits) and telephone visits, when medically appropriate.  Please check with your medical insurance to see if these types of visits are covered, as you will be responsible for any charges that are not paid by your insurance.      You can use Sh[employer] (secure electronic communication) to access to your chart, send your primary care provider a message, or make an appointment. Ask a team member how to get started.     For a price quote for your services, please call our Consumer Price Line at [phone] or our Imaging Cost estimation line at [phone] (for imaging tests).              Follow-ups after your visit        Additional Services     [facility] PT, HAND, AND CHIROPRACTIC REFERRAL       === This order will print in the [facility] Scheduling  Office ===    Physical therapy, hand therapy and chiropractic care are available  through:    [facility]  [facility]    Call one easy number to schedule at any of the above locations:  [phone].    Your provider has referred you to Physical Therapy at [facility] or [facility]    Indication/Reason for Referral: Low Back Pain  Onset of Illness:  Several years   Therapy Orders:  Evaluate and Treat  Special Programs:  None  Special Request:  None    Additional Comments for the therapist or chiropractor:   Bilateral leg pain and numbness since failed low back surgery in 2010    Please be aware that coverage of these services is subject to the terms and limitations of your health insurance plan.  Call member services at your health plan with any benefit or coverage questions.      Please bring the following to your appointment:    >>   Your personal calendar for scheduling future appointments  >>   Comfortable clothing            NEUROSURGERY REFERRAL       Your provider has referred you to: JESICA: [facility] [phone]   [email or web address]    Please be aware that coverage of these services is subject to the terms and limitations of your health insurance plan.  Call member services at your health plan with any benefit or coverage questions.      Please bring the following with you to your appointment:    (1) Any X-Rays, CTs or MRIs which have been performed.  Contact the facility where they were done to arrange for  prior to your scheduled appointment.   (2) List of current medications  (3) This referral request   (4) Any documents/labs given to you for this referral                  Who to contact     If you have questions or need follow up information about today's clinic visit or your schedule please contact [facility] directly at [phone].  Normal or non-critical lab and imaging results will be communicated to you by MyChart, letter or phone within 4 business  "days after the clinic has received the results. If you do not hear from us within 7 days, please contact the clinic through [employer] or phone. If you have a critical or abnormal lab result, we will notify you by phone as soon as possible.  Submit refill requests through [employer] or call your pharmacy and they will forward the refill request to us. Please allow 3 business days for your refill to be completed.          Additional Information About Your Visit        Care EveryWhere ID     This is your Care EveryWhere ID. This could be used by other organizations to access your [place] medical records  [id number]        Your Vitals Were     Pulse Temperature Respirations Height Pulse Oximetry       77 97.3  F (36.3  C) (Oral) 20 5' 5\" (1.651 m) 95%        Blood Pressure from Last 3 Encounters:   07/23/18 120/74   03/12/18 122/60   10/10/17 124/78    Weight from Last 3 Encounters:   03/12/18 179 lb (81.2 kg)   10/10/17 193 lb (87.5 kg)   12/22/16 196 lb (88.9 kg)              We Performed the Following     [facility] PT, HAND, AND CHIROPRACTIC REFERRAL     NEUROSURGERY REFERRAL     PAF COMPLETED        Primary Care Provider Office Phone # Fax #    Libby Baker [phone] [facility] [phone]        Equal Access to Services     [facility] : Hadii aad ku hadasho Soomaali, waaxda luqadaha, qaybta kaalmada aderayayada, vanesa العراقي. So [facility] [phone].    ATENCIÓN: Si habla español, tiene a davis disposición servicios gratuitos de asistencia lingüística. Denise al [phone].    We comply with applicable federal civil rights laws and [place] laws. We do not discriminate on the basis of race, color, national origin, age, disability, sex, sexual orientation, or gender identity.            Thank you!     Thank you for choosing [facility]  for your care. Our goal is always to provide you with excellent care. Hearing back from our " patients is one way we can continue to improve our services. Please take a few minutes to complete the written survey that you may receive in the mail after your visit with us. Thank you!             Your Updated Medication List - Protect others around you: Learn how to safely use, store and throw away your medicines at www.disposemymeds.org.          This list is accurate as of 7/23/18  4:04 PM.  Always use your most recent med list.                   Brand Name Dispense Instructions for use Diagnosis    amitriptyline 100 MG tablet    ELAVIL    90 tablet    TAKE 1 TABLET BY MOUTH EVERY DAY AT BEDTIME    Chronic low back pain, unspecified back pain laterality, with sciatica presence unspecified, Major depressive disorder, recurrent episode, moderate (H)       BENEFIBER DRINK MIX Powd      2 teaspoons w/water        citalopram 10 MG tablet    celeXA    90 tablet    Take 1 tablet (10 mg) by mouth daily    Major depressive disorder, recurrent episode, moderate (H)       gabapentin 300 MG capsule    NEURONTIN    540 capsule    Take 2 in the morning, 2 in the afternoon,  and 2 at night    Idiopathic peripheral neuropathy       hydrochlorothiazide 25 MG tablet    HYDRODIURIL    90 tablet    Take 1 tablet (25 mg) by mouth every morning    Essential hypertension with goal blood pressure less than 140/90       metoprolol tartrate 100 MG tablet    LOPRESSOR    180 tablet    Take 1 tablet (100 mg) by mouth 2 times daily    Essential hypertension with goal blood pressure less than 140/90       naproxen 500 MG tablet    NAPROSYN    180 tablet    Take 1 tablet (500 mg) by mouth 2 times daily as needed for moderate pain    Chronic low back pain, unspecified back pain laterality, with sciatica presence unspecified       * order for DME     1 each    Equipment being ordered: Nebulizer    Mild intermittent asthma with acute exacerbation       * order for DME     1 each    Equipment being ordered: light weight folding rolling walker  with brakes, seat, and a basket under the seat    Idiopathic peripheral neuropathy, Left leg weakness       TYLENOL 500 MG tablet   Generic drug:  acetaminophen      2 tablets as needed        * Notice:  This list has 2 medication(s) that are the same as other medications prescribed for you. Read the directions carefully, and ask your doctor or other care provider to review them with you.

## 2018-07-24 ASSESSMENT — PATIENT HEALTH QUESTIONNAIRE - PHQ9: SUM OF ALL RESPONSES TO PHQ QUESTIONS 1-9: 17

## 2018-08-08 ENCOUNTER — TELEPHONE (OUTPATIENT)
Dept: FAMILY MEDICINE | Facility: CLINIC | Age: 58
End: 2018-08-08

## 2018-08-08 DIAGNOSIS — R29.898 BILATERAL LEG WEAKNESS: Primary | ICD-10-CM

## 2018-08-08 NOTE — TELEPHONE ENCOUNTER
Reason for Call:  Home Health Care    Orders are needed for this patient. PT for leg weakness - fax number is [phone]    PT: yes    OT: josie    Skilled Nursing: josie    Pt Provider: Samuel    Phone Number Homecare Nurse can be reached at: *[phone]    Can we leave a detailed message on this number? YES    Phone number patient can be reached at: Other phone number:  na    Best Time: any    Call taken on 8/8/2018 at 8:21 AM by Jennifer Butler

## 2018-08-10 ENCOUNTER — TELEPHONE (OUTPATIENT)
Dept: FAMILY MEDICINE | Facility: CLINIC | Age: 58
End: 2018-08-10

## 2018-08-10 NOTE — TELEPHONE ENCOUNTER
Reason for Call:  Other call back    Detailed comments: Patient is calling regarding therapy. No one has called him to explain what is happening. Please call patient to update. Thank you    Phone Number Patient can be reached at: Home number on file [phone] (home)    Best Time: ASAP    Can we leave a detailed message on this number? YES    Call taken on 8/10/2018 at 3:21 PM by Mirta Schneider

## 2018-10-15 ENCOUNTER — HEALTH MAINTENANCE LETTER (OUTPATIENT)
Age: 58
End: 2018-10-15

## 2018-10-26 DIAGNOSIS — I10 ESSENTIAL HYPERTENSION WITH GOAL BLOOD PRESSURE LESS THAN 140/90: ICD-10-CM

## 2018-10-26 RX ORDER — METOPROLOL TARTRATE 100 MG
TABLET ORAL
Qty: 180 TABLET | Refills: 2 | Status: SHIPPED | OUTPATIENT
Start: 2018-10-26 | End: 2019-08-02

## 2018-11-01 ENCOUNTER — TELEPHONE (OUTPATIENT)
Dept: FAMILY MEDICINE | Facility: CLINIC | Age: 58
End: 2018-11-01

## 2018-11-01 DIAGNOSIS — M54.42 CHRONIC BILATERAL LOW BACK PAIN WITH BILATERAL SCIATICA: Primary | ICD-10-CM

## 2018-11-01 DIAGNOSIS — M54.41 CHRONIC BILATERAL LOW BACK PAIN WITH BILATERAL SCIATICA: Primary | ICD-10-CM

## 2018-11-01 DIAGNOSIS — G89.29 CHRONIC BILATERAL LOW BACK PAIN WITH BILATERAL SCIATICA: Primary | ICD-10-CM

## 2018-11-01 NOTE — TELEPHONE ENCOUNTER
Reason for Call:  Other call back    Detailed comments: Patient called saying he can't walk and wants to talk about therapy. Please call back.    Phone Number Patient can be reached at: Home number on file [phone] (home)    Best Time: any    Can we leave a detailed message on this number? YES    Call taken on 11/1/2018 at 3:11 PM by Amirah Prieto

## 2018-11-01 NOTE — TELEPHONE ENCOUNTER
Spoke with patient regarding referral for home care physical therapy.  Patient was seen last on 7/23/18 and discussed this at visit.  Patient states he is unable to leave the house for therapy due to his immobility and would like home care to come out and do physical therapy at least once a week. Having numbness in both legs, pain in back, and knees are not very strong due to immobility/numbness.   Please advise   Anna Lott RN

## 2018-11-02 NOTE — TELEPHONE ENCOUNTER
Ok. Home care should reach out to him. Please confirm that he is scheduled to see neurosurgery.  Buffy Culver MD

## 2018-11-02 NOTE — TELEPHONE ENCOUNTER
Patient notified of providers message as written. Patient verbalized understanding.  Patient has not scheduled with Neurosurgery due to immobility.  Patient will scheduled once seen by home care.    Anna Lott RN

## 2018-11-07 ENCOUNTER — DOCUMENTATION ONLY (OUTPATIENT)
Dept: CARE COORDINATION | Facility: CLINIC | Age: 58
End: 2018-11-07

## 2018-11-07 NOTE — PROGRESS NOTES
[facility] now requests orders and shares plan of care/discharge summaries for some patients through [employer].  Please REPLY TO THIS MESSAGE OR ROUTE BACK TO THE AUTHOR in order to give authorization for orders when needed.  This is considered a verbal order, you will still receive a faxed copy of orders for signature.  Thank you for your assistance in improving collaboration for our patients.    ORDER    Starting 11/11/18 OT 1w3 for safe ADL instruction and equipment. The plan will also include falls prevention plan, monitor and treat pain, monitor skin integrity,  monitor for s/s of depression to achieve the following goals.  1. Pt will demonstrate safety with toileting, clothing management, pericare using appropriate adaptive equipment Independently in four weeks.  2. Pt will demonstrate safety with tub/shower transfer using appropriate equipment with Min A in four weeks.    Natty Sahni OTR/L  [phone]  [email or web address]

## 2018-11-14 ENCOUNTER — DOCUMENTATION ONLY (OUTPATIENT)
Dept: CARE COORDINATION | Facility: CLINIC | Age: 58
End: 2018-11-14

## 2018-11-14 NOTE — PROGRESS NOTES
OK for    to revisit to assess [place] and [place] options to help pay for stairlift or ramp.    OSCAR RODRIGUEZ M.D.

## 2018-11-14 NOTE — PROGRESS NOTES
[facility] utilizes an encounter to take the place of a direct phone call to your office. Please take a moment to review the below request. Please reply or route message to author of this encounter.  Message will act as a verbal OK of orders requested below. Thank you.    ORDER   to revisit to assess [place] and [facility] options to help pay for stairlift or ramp.

## 2018-11-15 DIAGNOSIS — Z53.9 DIAGNOSIS NOT YET DEFINED: Primary | ICD-10-CM

## 2018-11-21 ENCOUNTER — DOCUMENTATION ONLY (OUTPATIENT)
Dept: CARE COORDINATION | Facility: CLINIC | Age: 58
End: 2018-11-21

## 2018-11-21 NOTE — PROGRESS NOTES
[facility] now requests orders and shares plan of care/discharge summaries for some patients through [employer].  Please REPLY TO THIS MESSAGE OR ROUTE BACK TO THE AUTHOR in order to give authorization for orders when needed.  This is considered a verbal order, you will still receive a faxed copy of orders for signature.  Thank you for your assistance in improving collaboration for our patients.    Dr Baker--Pt has been receiving home care PT for the last three weeks without improvement in LE weakness. PT, OT and social work have been working with pt and his wife to find ways to help him get to an appointment as he is now w/c bound without accessibility to leave home for an appointment. I can continue to see him 1x2 but have encouraged him that he needs to see a neurologist as soon as possible. Please call me if you have questions: [phone].    ORDER  PT reassessment and treat for w/c mobility, LE ther ex/ROM/stretching and transfer trng. The plan will also include falls risk assessment and falls prevention plan, monitor and treat pain, monitor skin integrity, monitor for s/s of depression,  and to achieve the following goals.  1. Pt will perform pivot transfers between surfaces with appropriate equipment with no physical assist from caregiver for reduced burden of care within 4 weeks.//ongoing  2. Pt will demonstrate good knowledge on operating manual wheelchair for safe transfers within 4 weeks.//ongoing  3. Pt will demonstrate good knowledge of HEP with reference to written handout for ongoing ROM and strength gains upon discharge.//progressing    Corrina Blanco, PT  [phone]  [email or web address]

## 2018-11-22 NOTE — PROGRESS NOTES
Call your physician immediately if you notice any of the following symptoms of a blood clot:   Sudden weakness in any limb  Numbness or tingling anywhere  Visual changes or loss of sight in either eye  Sudden onset of slurred speech or inability to speak  Dizziness or faintness  New pain, swelling, redness or heat in any extremity  New SOB or chest pain  Symptoms associated with blood clotting/low INR reviewed and pt verbalizes understanding: Yes.    Patient was notified that their INR result was low. Patient was instructed to continue taking their Coumadin/Warfarin as follows: 7 mg on M and F; 6 mg the other days.  Next INR is due in 2 weeks on 12/29/17.  Patient was instructed to contact us with any unusual bleeding, bruising, any changes in medications, diet or health status and if there are any other questions or concerns.   Sent message to pt/Valery, spouse via [employer] Home draw order placed for 12/29/17.           Please approve orders as requested        Jaciel Ba MD

## 2018-12-01 DIAGNOSIS — G60.9 IDIOPATHIC PERIPHERAL NEUROPATHY: ICD-10-CM

## 2018-12-03 NOTE — TELEPHONE ENCOUNTER
[place] - This is a controlled substance. Please complete controlled substance workflow and dot phrases, then reroute to RN refill pool  Rochelle King RN - BC

## 2018-12-04 RX ORDER — GABAPENTIN 300 MG/1
CAPSULE ORAL
Qty: 360 CAPSULE | Refills: 0 | Status: SHIPPED | OUTPATIENT
Start: 2018-12-04 | End: 2019-02-19

## 2018-12-04 NOTE — TELEPHONE ENCOUNTER
Please delegate writer in new  program if you would like  report. Thanks.    Routing refill request to provider for review/approval because:  Drug not on the [facility] refill protocol     Chantal Ortiz RN  [facility]

## 2018-12-04 NOTE — TELEPHONE ENCOUNTER
Controlled Substance Refill Request for gabapentin (NEURONTIN) 300 MG capsule  Problem List Complete:  No     PROVIDER TO CONSIDER COMPLETION OF PROBLEM LIST AND OVERVIEW/CONTROLLED SUBSTANCE AGREEMENT    Last Written Prescription Date:  3/12/2018  Last Fill Quantity: 540,   # refills: 4    Last Office Visit with [facility] primary care provider: 7/23/2018    Future Office visit:     Controlled substance agreement on file: No.      checked in past 3 months?  No, route to RN

## 2018-12-17 DIAGNOSIS — M54.5 CHRONIC LOW BACK PAIN, UNSPECIFIED BACK PAIN LATERALITY, WITH SCIATICA PRESENCE UNSPECIFIED: ICD-10-CM

## 2018-12-17 DIAGNOSIS — G89.29 CHRONIC LOW BACK PAIN, UNSPECIFIED BACK PAIN LATERALITY, WITH SCIATICA PRESENCE UNSPECIFIED: ICD-10-CM

## 2018-12-17 RX ORDER — NAPROXEN 500 MG/1
TABLET ORAL
Qty: 180 TABLET | Refills: 0 | Status: SHIPPED | OUTPATIENT
Start: 2018-12-17 | End: 2019-05-17

## 2018-12-17 NOTE — TELEPHONE ENCOUNTER
"Pending Prescriptions:                       Disp   Refills    naproxen (NAPROSYN) 500 MG tablet [Pharma*180 ta*0            Sig: TAKE 1 TABLET(500 MG) BY MOUTH TWICE DAILY AS           NEEDED FOR MODERATE PAIN    Failed [facility] refill protocol, see below:    Requested Prescriptions   Pending Prescriptions Disp Refills     naproxen (NAPROSYN) 500 MG tablet [Pharmacy Med Name: NAPROXEN 500MG TABLETS] 180 tablet 0     Sig: TAKE 1 TABLET(500 MG) BY MOUTH TWICE DAILY AS NEEDED FOR MODERATE PAIN    NSAID Medications Failed - 12/17/2018 12:35 PM       Failed - Normal ALT on file in past 12 months    Recent Labs   Lab Test 10/10/17  1116   ALT 36            Failed - Normal AST on file in past 12 months    Recent Labs   Lab Test 10/10/17  1116   AST 27            Failed - Normal CBC on file in past 12 months    Recent Labs   Lab Test 01/19/16  1035   WBC 13.1*   RBC 5.17   HGB 16.0   HCT 47.7                   Failed - Normal serum creatinine on file in past 12 months    Recent Labs   Lab Test 10/10/17  1116   CR 1.02            Passed - Blood pressure under 140/90 in past 12 months    BP Readings from Last 3 Encounters:   07/23/18 120/74   03/12/18 122/60   10/10/17 124/78                Passed - Recent (12 mo) or future (30 days) visit within the authorizing provider's specialty    Patient had office visit in the last 12 months or has a visit in the next 30 days with authorizing provider or within the authorizing provider's specialty.  See \"Patient Info\" tab in inbasket, or \"Choose Columns\" in Meds & Orders section of the refill encounter.             Passed - Patient is age 6-64 years        Tarah Ann RN    "

## 2019-02-08 ENCOUNTER — TELEPHONE (OUTPATIENT)
Dept: FAMILY MEDICINE | Facility: CLINIC | Age: 59
End: 2019-02-08

## 2019-02-08 DIAGNOSIS — G89.29 CHRONIC LOW BACK PAIN, UNSPECIFIED BACK PAIN LATERALITY, WITH SCIATICA PRESENCE UNSPECIFIED: Primary | ICD-10-CM

## 2019-02-08 DIAGNOSIS — M54.5 CHRONIC LOW BACK PAIN, UNSPECIFIED BACK PAIN LATERALITY, WITH SCIATICA PRESENCE UNSPECIFIED: Primary | ICD-10-CM

## 2019-02-08 NOTE — TELEPHONE ENCOUNTER
Patient would like a referral for physical therapy at [facility].  He has trouble walking due to low back pain and numbness in both legs. He has been seen there before (see 7/23/18 referral) and needs a new referral.   Please advise- referral pending.   Anna Lott RN

## 2019-02-08 NOTE — TELEPHONE ENCOUNTER
Patient notified of providers message as written.  Patient given number to call and schedule with neurosurgery: [facility] [phone].    Patient would like physical therapy referral faxed to [facility] in [place] .    Anna Lott RN

## 2019-02-19 DIAGNOSIS — G60.9 IDIOPATHIC PERIPHERAL NEUROPATHY: ICD-10-CM

## 2019-02-19 NOTE — TELEPHONE ENCOUNTER
gabapentin (NEURONTIN) 300 MG capsule        Last Written Prescription Date:  12/4/2018  Last Fill Quantity: 360,   # refills: 0  Last Office Visit: 7/23/2018  Future Office visit:       Routing refill request to provider for review/approval because:  Drug not on the [facility], [facility] or [facility] refill protocol or controlled substance

## 2019-02-20 RX ORDER — GABAPENTIN 300 MG/1
CAPSULE ORAL
Qty: 360 CAPSULE | Refills: 1 | Status: SHIPPED | OUTPATIENT
Start: 2019-02-20 | End: 2019-05-17

## 2019-03-04 DIAGNOSIS — I10 ESSENTIAL HYPERTENSION WITH GOAL BLOOD PRESSURE LESS THAN 140/90: ICD-10-CM

## 2019-03-04 RX ORDER — HYDROCHLOROTHIAZIDE 25 MG/1
25 TABLET ORAL EVERY MORNING
Qty: 30 TABLET | Refills: 0 | Status: SHIPPED | OUTPATIENT
Start: 2019-03-04 | End: 2019-04-10

## 2019-03-04 NOTE — TELEPHONE ENCOUNTER
Requested Prescriptions   Pending Prescriptions Disp Refills     hydrochlorothiazide (HYDRODIURIL) 25 MG tablet 90 tablet 3     Sig: Take 1 tablet (25 mg) by mouth every morning    There is no refill protocol information for this order

## 2019-03-04 NOTE — TELEPHONE ENCOUNTER
"Routing refill request to provider for review/approval because:  Labs not current:  Cr, K, Na    Requested Prescriptions   Pending Prescriptions Disp Refills     hydrochlorothiazide (HYDRODIURIL) 25 MG tablet 90 tablet 3     Sig: Take 1 tablet (25 mg) by mouth every morning    Diuretics (Including Combos) Protocol Failed - 3/4/2019  1:12 PM       Failed - Recent (12 mo) or future (30 days) visit within the authorizing provider's specialty    Patient had office visit in the last 12 months or has a visit in the next 30 days with authorizing provider or within the authorizing provider's specialty.  See \"Patient Info\" tab in inbasket, or \"Choose Columns\" in Meds & Orders section of the refill encounter.             Failed - Normal serum creatinine on file in past 12 months    Recent Labs   Lab Test 10/10/17  1116   CR 1.02             Failed - Normal serum potassium on file in past 12 months    Recent Labs   Lab Test 10/10/17  1116   POTASSIUM 3.6                   Failed - Normal serum sodium on file in past 12 months    Recent Labs   Lab Test 10/10/17  1116                Passed - Blood pressure under 140/90 in past 12 months    BP Readings from Last 3 Encounters:   07/23/18 120/74   03/12/18 122/60   10/10/17 124/78                Passed - Medication is active on med list       Passed - Patient is age 18 or older        Debbie Carl RN  "

## 2019-03-11 NOTE — TELEPHONE ENCOUNTER
Received a fax. Patient did not schedule for referred therapy.        Sent to the provider to advise. Liliam Barnes,

## 2019-03-18 DIAGNOSIS — M54.5 CHRONIC LOW BACK PAIN, UNSPECIFIED BACK PAIN LATERALITY, WITH SCIATICA PRESENCE UNSPECIFIED: ICD-10-CM

## 2019-03-18 DIAGNOSIS — G89.29 CHRONIC LOW BACK PAIN, UNSPECIFIED BACK PAIN LATERALITY, WITH SCIATICA PRESENCE UNSPECIFIED: ICD-10-CM

## 2019-03-18 RX ORDER — NAPROXEN 500 MG/1
500 TABLET ORAL 2 TIMES DAILY PRN
Qty: 180 TABLET | Refills: 0 | OUTPATIENT
Start: 2019-03-18

## 2019-03-18 NOTE — TELEPHONE ENCOUNTER
Requested Prescriptions   Pending Prescriptions Disp Refills     naproxen (NAPROSYN) 500 MG tablet 180 tablet 0     Sig: Take 1 tablet (500 mg) by mouth 2 times daily as needed for moderate pain    There is no refill protocol information for this order

## 2019-03-18 NOTE — TELEPHONE ENCOUNTER
"Routing refill request to provider for review/approval because:  Labs not current:        Requested Prescriptions   Pending Prescriptions Disp Refills     naproxen (NAPROSYN) 500 MG tablet  Last Written Prescription Date:  12/17/18  Last Fill Quantity: 180,  # refills: 0   Last office visit: 7/23/2018 with prescribing provider:     Future Office Visit:     180 tablet 0     Sig: Take 1 tablet (500 mg) by mouth 2 times daily as needed for moderate pain    NSAID Medications Failed - 3/18/2019  8:02 AM       Failed - Normal ALT on file in past 12 months    Recent Labs   Lab Test 10/10/17  1116   ALT 36            Failed - Normal AST on file in past 12 months    Recent Labs   Lab Test 10/10/17  1116   AST 27            Failed - Recent (12 mo) or future (30 days) visit within the authorizing provider's specialty    Patient had office visit in the last 12 months or has a visit in the next 30 days with authorizing provider or within the authorizing provider's specialty.  See \"Patient Info\" tab in inbasket, or \"Choose Columns\" in Meds & Orders section of the refill encounter.             Failed - Normal CBC on file in past 12 months    Recent Labs   Lab Test 01/19/16  1035   WBC 13.1*   RBC 5.17   HGB 16.0   HCT 47.7                   Failed - Normal serum creatinine on file in past 12 months    Recent Labs   Lab Test 10/10/17  1116   CR 1.02            Passed - Blood pressure under 140/90 in past 12 months    BP Readings from Last 3 Encounters:   07/23/18 120/74   03/12/18 122/60   10/10/17 124/78                Passed - Patient is age 6-64 years       Passed - Medication is active on med list        Rochelle King RN - BC      "

## 2019-04-10 DIAGNOSIS — I10 ESSENTIAL HYPERTENSION WITH GOAL BLOOD PRESSURE LESS THAN 140/90: ICD-10-CM

## 2019-04-11 RX ORDER — HYDROCHLOROTHIAZIDE 25 MG/1
TABLET ORAL
Qty: 30 TABLET | Refills: 0 | Status: SHIPPED | OUTPATIENT
Start: 2019-04-11 | End: 2019-05-12

## 2019-04-11 NOTE — TELEPHONE ENCOUNTER
Routing refill request to provider for review/approval because:  Izabel given x1 and patient did not follow up, please advise  Labs not current:  Creatinine, potassium, sodium (last done in 2017)  Previously seeing Dr. Baker    Per patient, he has history of back surgery x 2 and since then, he has had trouble walking  This is not new for him and makes it difficult for him to leave the home  He stated that he is disabled and basically homebound  He asked if HC could come out instead of him coming in for an appointment.  He mentioned that he has had HC before  Explained to him that he has not been seen for a while and he has not established cares with a new provider yet  Advised that labs are also needed to ensure that it is safe for him to continue current meds  Patient continued stating that he is unable to leave the house so there is no way he can come to an appointment   He stated that he struggles to get onto a wheelchair on his own at home and does not know how he can get around outside the house.  Asked him if safety is an issue in the home and he stated that he has a son but his son was also going to call the clinic to see if HC can be sent out  Explained that he would need a new provider and that provider can assess his needs and order what is appropriate    Please advise    Gene Polk RN

## 2019-04-11 NOTE — TELEPHONE ENCOUNTER
Patient is calling because he needs refill but can not come to be seen as he is habing trouble walking. Please call him at  [phone]

## 2019-04-16 ENCOUNTER — VIRTUAL VISIT (OUTPATIENT)
Dept: FAMILY MEDICINE | Facility: CLINIC | Age: 59
End: 2019-04-16
Payer: COMMERCIAL

## 2019-04-16 DIAGNOSIS — G89.29 CHRONIC LOW BACK PAIN, UNSPECIFIED BACK PAIN LATERALITY, WITH SCIATICA PRESENCE UNSPECIFIED: ICD-10-CM

## 2019-04-16 DIAGNOSIS — I10 BENIGN ESSENTIAL HYPERTENSION: ICD-10-CM

## 2019-04-16 DIAGNOSIS — M54.5 CHRONIC LOW BACK PAIN, UNSPECIFIED BACK PAIN LATERALITY, WITH SCIATICA PRESENCE UNSPECIFIED: ICD-10-CM

## 2019-04-16 DIAGNOSIS — Z99.3 WHEELCHAIR BOUND: Primary | ICD-10-CM

## 2019-04-16 DIAGNOSIS — F32.1 MODERATE MAJOR DEPRESSION (H): ICD-10-CM

## 2019-04-16 PROCEDURE — G2012 BRIEF CHECK IN BY MD/QHP: HCPCS | Performed by: NURSE PRACTITIONER

## 2019-04-16 NOTE — PROGRESS NOTES
Patient opted to conduct today's return visit via telephone vs an in person visit to the clinic.    I spoke with: Elaina    The reason for the telephone visit was: medication refills    Pertinent history and review of systems: Requesting refills of all of his medications including Naproxen, hydrochlorothiazide, metoprolol, citalopram    He has not had clinic/lab visit since 2017    Assessment: patient needs clinic visit with labs. If he is unable to leave his home he may need a service that is able to provide in home evaluation by a provider. Referral placed for care coordination.    Advice/instructions given to patient/guardian including prescriptions, follow up appointment or orders for diagnostic testing:     I explained that I cannot safely refill his medications without vital signs and lab data. Per my chart review it appears he has been told of this several times. It also appears that he has had lower extremity weakness/dysfunction since last summer which he also has not been seen for.     Phone call contact time    Call Started at: 11:13    Call Ended at: 11:23         Care coordination referral:  Services are provided by a Care Coordinator for people with complex needs such as: medical, social, or financial troubles.  The Care Coordinator works with the patient and their Primary Care Provider to determine health goals, obtain resources, achieve outcomes, and develop care plans that help coordinate the patient's care.     Reason for Referral: Resources for Transportation    Additional pertinent details:  Patient needs office visit as he has not been seen in clinic or had labs since 2017. He is wheelchair bound. He reports that he has not left his home in the last year. He needs help accomodating a clinic visit. I cannot safely refill his medications without appropriate evaluation. If this is not an option, is it possible to find a group that can do home visits for him.    Clinical Staff have discussed the  Care Coordination Referral with the patient and/or caregiver: yes

## 2019-04-17 ENCOUNTER — PATIENT OUTREACH (OUTPATIENT)
Dept: CARE COORDINATION | Facility: CLINIC | Age: 59
End: 2019-04-17

## 2019-04-17 ASSESSMENT — ACTIVITIES OF DAILY LIVING (ADL): DEPENDENT_IADLS:: INDEPENDENT

## 2019-04-17 NOTE — LETTER
[facility]  Complex Care Plan  About Me:    Patient Name:  Elaina Valenzuela    YOB: 1960  Age:         58 year old   Araseli MRN:    [id number] Telephone Information:  Home Phone [phone]   Mobile [phone]       Address:  [address] [place] [address]  [place] [address] Email address:  No e-mail address on record      Emergency Contact(s)    Name Relationship Lgl Grd Work Phone Home Phone Mobile Phone   1. BENOIT VALENZUELA* Spouse   [phone]    2. NO SECONDARY     none            Primary language:  English     needed? No   [employer]:  [phone] op. 1  Other communication barriers: English as a second language  Preferred Method of Communication:  Mail  Current living arrangement: I live in a private home with family  Mobility Status/ Medical Equipment: Independent w/Device    Health Maintenance  Health Maintenance Reviewed: Not assessed    My Access Plan  Medical Emergency 911   Primary Clinic Line [facility] - [phone]   24 Hour Appointment Line [phone] or  [phone] ([phone]) (toll-free)   24 Hour Nurse Line [phone] (toll-free)   Preferred Urgent Care     Preferred Hospital     Preferred Pharmacy [facility] - A MAIL ORDER [employer]     Behavioral Health Crisis Line The National Suicide Prevention Lifeline at 1-411.472.4066 or 911             My Care Team Members  Patient Care Team       Relationship Specialty Notifications Start End    Jean Osorio MD PCP - General Family Practice  4/11/19     Phone: [phone] Fax: [phone] [address] [facility] [address]    Libby Baker MD Assigned PCP   3/27/16     Tanisha Romano LSW Lead Care Coordinator Primary Care - CC  4/16/19     Phone: [phone] Fax: [phone]                My Care Plans  Self Management and Treatment Plan  Goals and (Comments)  Goals        General    Financial Wellbeing (pt-stated)     Notes -  Note created  4/17/2019  4:14 PM by Tanisha Romano LSW    Goal Statement: I would like to learn about the financial resources available to me  Measure of Success: Whether or not I am able to qualify  Supportive Steps to Achieve: [facility] to send me resources and I will apply for the ones that I am interested in.   Barriers: None noted.   Strengths: Pt is willing and able to apply for assistance.   Date to Achieve By: July 2019  Patient expressed understanding of goal: yes                 Action Plans on File:   Asthma        Depression          Advance Care Plans/Directives Type:        My Medical and Care Information  Problem List   Patient Active Problem List   Diagnosis     Chronic low back pain     CARDIOVASCULAR SCREENING; LDL GOAL LESS THAN 130     Urinary retention     Neurogenic bladder     Moderate major depression (H)     Mild intermittent asthma without complication     Erectile dysfunction, unspecified erectile dysfunction type     Benign essential hypertension     Obesity due to excess calories, unspecified obesity severity      Current Medications and Allergies:  See printed Medication Report.    Care Coordination Start Date: 4/17/2019   Frequency of Care Coordination: 2 weeks   Form Last Updated: 04/17/2019

## 2019-05-12 DIAGNOSIS — I10 ESSENTIAL HYPERTENSION WITH GOAL BLOOD PRESSURE LESS THAN 140/90: ICD-10-CM

## 2019-05-14 ENCOUNTER — PATIENT OUTREACH (OUTPATIENT)
Dept: CARE COORDINATION | Facility: CLINIC | Age: 59
End: 2019-05-14

## 2019-05-14 ASSESSMENT — ACTIVITIES OF DAILY LIVING (ADL): DEPENDENT_IADLS:: TRANSPORTATION

## 2019-05-14 NOTE — PROGRESS NOTES
Clinic Care Coordination Contact 5/14/19  OUTREACH    Referral Information:  Referral Source: PCP    Primary Diagnosis: Psychosocial    Chief Complaint   Patient presents with     Clinic Care Coordination - Follow-up     SW        Universal Utilization: NA  Clinic Utilization  Difficulty keeping appointments:: No  Compliance Concerns: No  No-Show Concerns: No  No PCP office visit in Past Year: Yes  Utilization    Last refreshed: 5/14/2019  2:19 PM:  Hospital Admissions 0           Last refreshed: 5/14/2019  2:19 PM:  ED Visits 0           Last refreshed: 5/14/2019  2:19 PM:  No Show Count (past year) 0              Current as of: 5/14/2019  2:19 PM              Clinical Concerns:  Current Medical Concerns:    Patient Active Problem List   Diagnosis     Chronic low back pain     CARDIOVASCULAR SCREENING; LDL GOAL LESS THAN 130     Urinary retention     Neurogenic bladder     Moderate major depression (H)     Mild intermittent asthma without complication     Erectile dysfunction, unspecified erectile dysfunction type     Benign essential hypertension     Obesity due to excess calories, unspecified obesity severity       Current Behavioral Concerns: See above    Education Provided to patient: NA      Health Maintenance Reviewed:    Clinical Pathway: None    Medication Management:  NA     Functional Status:  Dependent ADLs:: Independent  Dependent IADLs:: Independent  Bed or wheelchair confined:: Yes  Mobility Status: Independent w/Device  Pt is WC bound and has not left the home for over a year. He states he is becoming weaker and weaker and more dependent on his family for assistance.     Living Situation:  Current living arrangement:: I live in a private home with family  Type of residence:: Private home - stairs    Diet/Exercise/Sleep:       Transportation:  Transportation concerns (GOAL):: Yes  Transportation means:: Accessible car  SW sent the pt a [employer] application as well as discussed Transit Link, etc.  Pt needs to get in to the clinic to fill his medications, etc and has not been seen for over a year     Psychosocial:  Mu-ism or spiritual beliefs that impact treatment:: No  Mental health DX:: No  Mental health management concern (GOAL):: No  Informal Support system:: Children, Spouse     Financial/Insurance:   Financial/Insurance concerns (GOAL):: Yes(SSDI 1800, wife is working as well)  Pt would benefit for waivered services so the application for Long Term Care was sent to him so we could apply for the CADI waiver. SW also made a [place] Choice Assessment to [facility] so they can assess him for needs. Pt explained that he has not heard from the [facility] yet. They are typically out 6-8 weeks for scheduling/     Resources and Interventions:  Current Resources:    ;   Community Resources: None     Equipment Currently Used at Home: wheelchair, manual    Advance Care Plan/Directive  Advanced Care Plans/Directives on file:: No  Advanced Care Plan/Directive Status: Considering Options    Referrals Placed: [facility]     Goals:   Goals        General    Financial Wellbeing (pt-stated)     Notes - Note created  4/17/2019  4:14 PM by Tanisha Romano LSW    Goal Statement: I would like to learn about the financial resources available to me  Measure of Success: Whether or not I am able to qualify  Supportive Steps to Achieve: [facility] to send me resources and I will apply for the ones that I am interested in.   Barriers: None noted.   Strengths: Pt is willing and able to apply for assistance.   Date to Achieve By: July 2019  Patient expressed understanding of goal: yes                  Patient/Caregiver understanding: yes    Outreach Frequency: monthly  Future Appointments              In 3 days Rosas Flores MD [facility] [place]          Plan:   1.) Pt to complete his [employer] leena and LTC leena for the [facility] and fill out/turn in.  2.) Pt to make an apt with the [facility]  assesor when they call him to schedule.  3.) Pt will send his completed [employer] leena to his PCP to complete the doctor portion.      Tanisha Romano [facility] -Care Coordination  Esdras Cornejo  [phone]  5/14/2019 3:17 PM

## 2019-05-15 RX ORDER — HYDROCHLOROTHIAZIDE 25 MG/1
TABLET ORAL
Qty: 30 TABLET | Refills: 0 | Status: SHIPPED | OUTPATIENT
Start: 2019-05-15 | End: 2019-05-17

## 2019-05-16 NOTE — PROGRESS NOTES
SUBJECTIVE:   Elaina Valenzuela is a 58 year old male who presents to clinic today for the following   health issues:    Chief Complaint   Patient presents with     Back Pain     requesting medication     Recheck Medication     prostate ; hard to urinate     Health Maintenance     ACT, AAP, PHQ9, GAD7, CMP, Colonoscopy     Forms     [employer] ; Can be mailed to patient     Patient states he has had left leg numbness that has been present since 2010 after having back surgery.  He has trouble with gait and with standing without assistance.  Needs med refills as well.  He has a diagnosis of paraplegia.  He has a history of diabetes and HTN, has been without some of his medications for some time now.        Additional history: as documented  Family history reviewed and updated    Reviewed  and updated as needed this visit by clinical staff  Tobacco  Allergies  Meds  Problems  Med Hx  Surg Hx  Fam Hx         Reviewed and updated as needed this visit by Provider  Tobacco  Allergies  Meds  Problems  Med Hx  Surg Hx  Fam Hx         BP Readings from Last 3 Encounters:   05/17/19 138/84   07/23/18 120/74   03/12/18 122/60    Wt Readings from Last 3 Encounters:   03/12/18 81.2 kg (179 lb)   10/10/17 87.5 kg (193 lb)   12/22/16 88.9 kg (196 lb)           ROS: 10 point ROS neg other than the symptoms noted above in the HPI.    Physical Exam   Constitutional: He is oriented to person, place, and time. He appears well-developed and well-nourished. No distress.   HENT:   Head: Normocephalic and atraumatic.   Right Ear: External ear normal.   Left Ear: External ear normal.   Mouth/Throat: Oropharynx is clear and moist.   Eyes: Pupils are equal, round, and reactive to light. EOM are normal. No scleral icterus.   Neck: Normal range of motion. Neck supple.   Cardiovascular: Normal rate, regular rhythm and normal heart sounds.   No murmur heard.  Pulmonary/Chest: Effort normal and breath sounds normal. He has no  wheezes. He has no rales.   Musculoskeletal: Normal range of motion.   Neurological: He is alert and oriented to person, place, and time.   Skin: Skin is warm and dry.   Psychiatric: He has a normal mood and affect. His behavior is normal. Judgment and thought content normal.     Assessment & Plan       ICD-10-CM    1. Hyperglycemia R73.9 COMPREHENSIVE METABOLIC PANEL     Hemoglobin A1c   2. Chronic low back pain, unspecified back pain laterality, with sciatica presence unspecified M54.5 amitriptyline (ELAVIL) 100 MG tablet    G89.29 naproxen (NAPROSYN) 500 MG tablet     [facility] PT, HAND, AND CHIROPRACTIC REFERRAL   3. Major depressive disorder, recurrent episode, moderate (H) F33.1 amitriptyline (ELAVIL) 100 MG tablet     citalopram (CELEXA) 20 MG tablet     CARE COORDINATION REFERRAL   4. Essential hypertension with goal blood pressure less than 140/90 I10 COMPREHENSIVE METABOLIC PANEL     hydrochlorothiazide (HYDRODIURIL) 25 MG tablet   5. Idiopathic peripheral neuropathy G60.9 gabapentin (NEURONTIN) 300 MG capsule   6. Paraplegia (H) G82.20 NEUROSURGERY REFERRAL     CARE COORDINATION REFERRAL   7. Neurogenic bladder N31.9 UROLOGY ADULT REFERRAL     CARE COORDINATION REFERRAL   8. Screen for colon cancer Z12.11 GASTROENTEROLOGY ADULT REF PROCEDURE ONLY   9. Encounter for lipid screening for cardiovascular disease Z13.220 Lipid panel reflex to direct LDL Non-fasting    Z13.6    10. S/P lumbar fusion Z98.1 NEUROSURGERY REFERRAL   11. Spinal meningioma (H) D32.1 NEUROSURGERY REFERRAL   12. Screening for prostate cancer Z12.5 PSA, screen   13. Urinary retention R33.9 tamsulosin (FLOMAX) 0.4 MG capsule       Patient Instructions   Elaina     Here is the plan:    1.  Urinary issue - see urology, try flomax once per day.  This may be due to your prostate, however may also be due to your back issue.    2.  Requested medications are refilled    3.  Hypertension - check your blood pressure daily to make sure it's within normal  limits.    4.lower back issue - see physical therapy, they will contact you to set up the appointment.  See neurosurgery to get an assessment.    5. Depression - we have increased your celexa dose to 20mg daily.  Please follow-up with me in about 1 month to discuss your progress.  Consider mental health therapy in the future as well.    6.  I will not forget to fill out your form    Rosas Escalante MD     A total of 45 minutes spent face-to-face with greater than 50% of the time spent in counseling and coordinating cares of the issues above     Return in about 1 month (around 6/17/2019) for depression, hypertension.    Rosas Escalante MD  [facility]

## 2019-05-17 ENCOUNTER — OFFICE VISIT (OUTPATIENT)
Dept: FAMILY MEDICINE | Facility: CLINIC | Age: 59
End: 2019-05-17
Payer: COMMERCIAL

## 2019-05-17 VITALS
OXYGEN SATURATION: 94 % | TEMPERATURE: 97.6 F | DIASTOLIC BLOOD PRESSURE: 84 MMHG | SYSTOLIC BLOOD PRESSURE: 138 MMHG | HEART RATE: 65 BPM | RESPIRATION RATE: 20 BRPM

## 2019-05-17 DIAGNOSIS — F33.1 MAJOR DEPRESSIVE DISORDER, RECURRENT EPISODE, MODERATE (H): ICD-10-CM

## 2019-05-17 DIAGNOSIS — Z13.220 ENCOUNTER FOR LIPID SCREENING FOR CARDIOVASCULAR DISEASE: ICD-10-CM

## 2019-05-17 DIAGNOSIS — I10 ESSENTIAL HYPERTENSION WITH GOAL BLOOD PRESSURE LESS THAN 140/90: ICD-10-CM

## 2019-05-17 DIAGNOSIS — Z12.11 SCREEN FOR COLON CANCER: ICD-10-CM

## 2019-05-17 DIAGNOSIS — N31.9 NEUROGENIC BLADDER: ICD-10-CM

## 2019-05-17 DIAGNOSIS — M54.5 CHRONIC LOW BACK PAIN, UNSPECIFIED BACK PAIN LATERALITY, WITH SCIATICA PRESENCE UNSPECIFIED: ICD-10-CM

## 2019-05-17 DIAGNOSIS — R73.9 HYPERGLYCEMIA: Primary | ICD-10-CM

## 2019-05-17 DIAGNOSIS — Z98.1 S/P LUMBAR FUSION: ICD-10-CM

## 2019-05-17 DIAGNOSIS — Z13.6 ENCOUNTER FOR LIPID SCREENING FOR CARDIOVASCULAR DISEASE: ICD-10-CM

## 2019-05-17 DIAGNOSIS — G82.20 PARAPLEGIA (H): ICD-10-CM

## 2019-05-17 DIAGNOSIS — D32.1 SPINAL MENINGIOMA (H): ICD-10-CM

## 2019-05-17 DIAGNOSIS — R33.9 URINARY RETENTION: ICD-10-CM

## 2019-05-17 DIAGNOSIS — Z12.5 SCREENING FOR PROSTATE CANCER: ICD-10-CM

## 2019-05-17 DIAGNOSIS — G60.9 IDIOPATHIC PERIPHERAL NEUROPATHY: ICD-10-CM

## 2019-05-17 DIAGNOSIS — G89.29 CHRONIC LOW BACK PAIN, UNSPECIFIED BACK PAIN LATERALITY, WITH SCIATICA PRESENCE UNSPECIFIED: ICD-10-CM

## 2019-05-17 LAB — HBA1C MFR BLD: 5.4 % (ref 0–5.6)

## 2019-05-17 PROCEDURE — 36415 COLL VENOUS BLD VENIPUNCTURE: CPT | Performed by: FAMILY MEDICINE

## 2019-05-17 PROCEDURE — 80061 LIPID PANEL: CPT | Performed by: FAMILY MEDICINE

## 2019-05-17 PROCEDURE — 83036 HEMOGLOBIN GLYCOSYLATED A1C: CPT | Performed by: FAMILY MEDICINE

## 2019-05-17 PROCEDURE — 80053 COMPREHEN METABOLIC PANEL: CPT | Performed by: FAMILY MEDICINE

## 2019-05-17 PROCEDURE — 99215 OFFICE O/P EST HI 40 MIN: CPT | Performed by: FAMILY MEDICINE

## 2019-05-17 PROCEDURE — G0103 PSA SCREENING: HCPCS | Performed by: FAMILY MEDICINE

## 2019-05-17 RX ORDER — GABAPENTIN 300 MG/1
600 CAPSULE ORAL 3 TIMES DAILY
Qty: 360 CAPSULE | Refills: 1 | Status: SHIPPED | OUTPATIENT
Start: 2019-05-17 | End: 2019-08-02

## 2019-05-17 RX ORDER — HYDROCHLOROTHIAZIDE 25 MG/1
TABLET ORAL
Qty: 90 TABLET | Refills: 1 | Status: SHIPPED | OUTPATIENT
Start: 2019-05-17 | End: 2019-08-02

## 2019-05-17 RX ORDER — NAPROXEN 500 MG/1
500 TABLET ORAL 2 TIMES DAILY PRN
Qty: 180 TABLET | Refills: 0 | Status: SHIPPED | OUTPATIENT
Start: 2019-05-17 | End: 2019-08-02

## 2019-05-17 RX ORDER — METOPROLOL TARTRATE 100 MG
TABLET ORAL
Qty: 180 TABLET | Refills: 2 | Status: CANCELLED | OUTPATIENT
Start: 2019-05-17

## 2019-05-17 RX ORDER — CITALOPRAM HYDROBROMIDE 20 MG/1
20 TABLET ORAL DAILY
Qty: 90 TABLET | Refills: 1 | Status: SHIPPED | OUTPATIENT
Start: 2019-05-17 | End: 2020-06-30

## 2019-05-17 RX ORDER — AMITRIPTYLINE HYDROCHLORIDE 100 MG/1
TABLET ORAL
Qty: 90 TABLET | Refills: 3 | Status: SHIPPED | OUTPATIENT
Start: 2019-05-17 | End: 2019-08-02

## 2019-05-17 RX ORDER — TAMSULOSIN HYDROCHLORIDE 0.4 MG/1
0.4 CAPSULE ORAL DAILY
Qty: 30 CAPSULE | Refills: 0 | Status: SHIPPED | OUTPATIENT
Start: 2019-05-17 | End: 2020-11-11

## 2019-05-17 RX ORDER — CITALOPRAM HYDROBROMIDE 10 MG/1
10 TABLET ORAL DAILY
Qty: 90 TABLET | Refills: 4 | Status: CANCELLED | OUTPATIENT
Start: 2019-05-17

## 2019-05-17 NOTE — LETTER
[facility]  [address] [place] [address]. [place] [address]    May 21, 2019    Jeytimur Bianca  [address]  [place] [address]          Dear Elaina,  Your most recent labs are not concerning at this time.  Your Liver function, kidney function and electrolytes are all normal.  Your cholesterol could use improvement as your LDL (bad cholesterol) is above 100, and your HDL (good cholesterol) is low.  We use your cholesterol panel to calculate your ASCVD risk score, which is the percent chance that you'll have a significant cardiovascular event, such as a heart attack or stroke, in the next 10 years.  Your risk is 12%.  When the risk is > 7.5% research shows that a cholesterol medication should be prescribed to protect your heart from future damage.  I'll put that prescription in and we can talk more about it at your next visit. Please continue your healthy diet and regular exercise regimen discussed in clinic. Let me know if you have any questions.   Results for orders placed or performed in visit on 05/17/19   COMPREHENSIVE METABOLIC PANEL   Result Value Ref Range    Sodium 141 133 - 144 mmol/L    Potassium 3.8 3.4 - 5.3 mmol/L    Chloride 103 94 - 109 mmol/L    Carbon Dioxide 29 20 - 32 mmol/L    Anion Gap 9 3 - 14 mmol/L    Glucose 96 70 - 99 mg/dL    Urea Nitrogen 15 7 - 30 mg/dL    Creatinine 0.90 0.66 - 1.25 mg/dL    GFR Estimate >90 >60 mL/min/[1.73_m2]    GFR Estimate If Black >90 >60 mL/min/[1.73_m2]    Calcium 9.5 8.5 - 10.1 mg/dL    Bilirubin Total 0.8 0.2 - 1.3 mg/dL    Albumin 4.2 3.4 - 5.0 g/dL    Protein Total 8.4 6.8 - 8.8 g/dL    Alkaline Phosphatase 77 40 - 150 U/L    ALT 24 0 - 70 U/L    AST 20 0 - 45 U/L   Hemoglobin A1c   Result Value Ref Range    Hemoglobin A1C 5.4 0 - 5.6 %   Lipid panel reflex to direct LDL Non-fasting   Result Value Ref Range    Cholesterol 214 (H) <200 mg/dL    Triglycerides 198 (H) <150 mg/dL     HDL Cholesterol 34 (L) >39 mg/dL    LDL Cholesterol Calculated 140 (H) <100 mg/dL    Non HDL Cholesterol 180 (H) <130 mg/dL   PSA, screen   Result Value Ref Range    PSA 0.91 0 - 4 ug/L       If you have any questions or concerns, please me or my clinic team at [phone].      Sincerely,        Rosas Flores MD/bt

## 2019-05-17 NOTE — PATIENT INSTRUCTIONS
Elaina     Here is the plan:    1.  Urinary issue - see urology, try flomax once per day.  This may be due to your prostate, however may also be due to your back issue.    2.  Requested medications are refilled    3.  Hypertension - check your blood pressure daily to make sure it's within normal limits.    4.lower back issue - see physical therapy, they will contact you to set up the appointment.  See neurosurgery to get an assessment.    5. Depression - we have increased your celexa dose to 20mg daily.  Please follow-up with me in about 1 month to discuss your progress.  Consider mental health therapy in the future as well.    6.  I will not forget to fill out your form    Rosas Escalante MD

## 2019-05-17 NOTE — LETTER
My Asthma Action Plan  Name: Elaina Valenzuela   YOB: 1960  Date: 5/16/2019   My doctor: Rosas Escalante MD   My clinic: [facility]        My Control Medicine: { :337546}  My Rescue Medicine: { :590199}  {AAP include Oral Steroid:331901} My Asthma Severity: { :798751}  Avoid your asthma triggers: { :915771}  None     {Is patient a child or adult?:862269}       GREEN ZONE   Good Control    I feel good    No cough or wheeze    Can work, sleep and play without asthma symptoms       Take your asthma control medicine every day.     1. If exercise triggers your asthma, take your rescue medication    15 minutes before exercise or sports, and    During exercise if you have asthma symptoms  2. Spacer to use with inhaler: If you have a spacer, make sure to use it with your inhaler             YELLOW ZONE Getting Worse  I have ANY of these:    I do not feel good    Cough or wheeze    Chest feels tight    Wake up at night   1. Keep taking your Green Zone medications  2. Start taking your rescue medicine:    every 20 minutes for up to 1 hour. Then every 4 hours for 24-48 hours.  3. If you stay in the Yellow Zone for more than 12-24 hours, contact your doctor.  4. If you do not return to the Green Zone in 12-24 hours or you get worse, start taking your oral steroid medicine if prescribed by your provider.           RED ZONE Medical Alert - Get Help  I have ANY of these:    I feel awful    Medicine is not helping    Breathing getting harder    Trouble walking or talking    Nose opens wide to breathe       1. Take your rescue medicine NOW  2. If your provider has prescribed an oral steroid medicine, start taking it NOW  3. Call your doctor NOW  4. If you are still in the Red Zone after 20 minutes and you have not reached your doctor:    Take your rescue medicine again and    Call 911 or go to the emergency room right away    See your regular doctor within 2 weeks of an Emergency Room or Urgent Care  visit for follow-up treatment.          Annual Reminders:  Meet with Asthma Educator,  Flu Shot in the Fall, consider Pneumonia Vaccination for patients with asthma (aged 19 and older).    Pharmacy:    [facility] - A MAIL ORDER [facility]  [id number] - [place], [place] - [address] [facility]Y #[id number] - [place], [place] - [address]   [id number] [place] - [address] [facility] [address] - [place], [place] - [address]  [id number] - [place], [place] - [address] AT [facility]                      Asthma Triggers  How To Control Things That Make Your Asthma Worse    Triggers are things that make your asthma worse.  Look at the list below to help you find your triggers and what you can do about them.  You can help prevent asthma flare-ups by staying away from your triggers.      Trigger                                                          What you can do   Cigarette Smoke  Tobacco smoke can make asthma worse. Do not allow smoking in your home, car or around you.  Be sure no one smokes at a child s day care or school.  If you smoke, ask your health care provider for ways to help you quit.  Ask family members to quit too.  Ask your health care provider for a referral to Quit Plan to help you quit smoking, or call 7-469-343-PLAN.     Colds, Flu, Bronchitis  These are common triggers of asthma. Wash your hands often.  Don t touch your eyes, nose or mouth.  Get a flu shot every year.     Dust Mites  These are tiny bugs that live in cloth or carpet. They are too small to see. Wash sheets and blankets in hot water every week.   Encase pillows and mattress in dust mite proof covers.  Avoid having carpet if you can. If you have carpet, vacuum weekly.   Use a dust mask and HEPA vacuum.   Pollen and Outdoor Mold  Some people are allergic to trees, grass, or weed pollen, or molds. Try to keep  your windows closed.  Limit time out doors when pollen count is high.   Ask you health care provider about taking medicine during allergy season.     Animal Dander  Some people are allergic to skin flakes, urine or saliva from pets with fur or feathers. Keep pets with fur or feathers out of your home.    If you can t keep the pet outdoors, then keep the pet out of your bedroom.  Keep the bedroom door closed.  Keep pets off cloth furniture and away from stuffed toys.     Mice, Rats, and Cockroaches  Some people are allergic to the waste from these pests.   Cover food and garbage.  Clean up spills and food crumbs.  Store grease in the refrigerator.   Keep food out of the bedroom.   Indoor Mold  This can be a trigger if your home has high moisture. Fix leaking faucets, pipes, or other sources of water.   Clean moldy surfaces.  Dehumidify basement if it is damp and smelly.   Smoke, Strong Odors, and Sprays  These can reduce air quality. Stay away from strong odors and sprays, such as perfume, powder, hair spray, paints, smoke incense, paint, cleaning products, candles and new carpet.   Exercise or Sports  Some people with asthma have this trigger. Be active!  Ask your doctor about taking medicine before sports or exercise to prevent symptoms.    Warm up for 5-10 minutes before and after sports or exercise.     Other Triggers of Asthma  Cold air:  Cover your nose and mouth with a scarf.  Sometimes laughing or crying can be a trigger.  Some medicines and food can trigger asthma.

## 2019-05-20 LAB
ALBUMIN SERPL-MCNC: 4.2 G/DL (ref 3.4–5)
ALP SERPL-CCNC: 77 U/L (ref 40–150)
ALT SERPL W P-5'-P-CCNC: 24 U/L (ref 0–70)
ANION GAP SERPL CALCULATED.3IONS-SCNC: 9 MMOL/L (ref 3–14)
AST SERPL W P-5'-P-CCNC: 20 U/L (ref 0–45)
BILIRUB SERPL-MCNC: 0.8 MG/DL (ref 0.2–1.3)
BUN SERPL-MCNC: 15 MG/DL (ref 7–30)
CALCIUM SERPL-MCNC: 9.5 MG/DL (ref 8.5–10.1)
CHLORIDE SERPL-SCNC: 103 MMOL/L (ref 94–109)
CHOLEST SERPL-MCNC: 214 MG/DL
CO2 SERPL-SCNC: 29 MMOL/L (ref 20–32)
CREAT SERPL-MCNC: 0.9 MG/DL (ref 0.66–1.25)
GFR SERPL CREATININE-BSD FRML MDRD: >90 ML/MIN/{1.73_M2}
GLUCOSE SERPL-MCNC: 96 MG/DL (ref 70–99)
HDLC SERPL-MCNC: 34 MG/DL
LDLC SERPL CALC-MCNC: 140 MG/DL
NONHDLC SERPL-MCNC: 180 MG/DL
POTASSIUM SERPL-SCNC: 3.8 MMOL/L (ref 3.4–5.3)
PROT SERPL-MCNC: 8.4 G/DL (ref 6.8–8.8)
PSA SERPL-ACNC: 0.91 UG/L (ref 0–4)
SODIUM SERPL-SCNC: 141 MMOL/L (ref 133–144)
TRIGL SERPL-MCNC: 198 MG/DL

## 2019-05-21 DIAGNOSIS — E78.5 HYPERLIPIDEMIA LDL GOAL <100: Primary | ICD-10-CM

## 2019-05-21 RX ORDER — ATORVASTATIN CALCIUM 20 MG/1
20 TABLET, FILM COATED ORAL DAILY
Qty: 90 TABLET | Refills: 0 | Status: SHIPPED | OUTPATIENT
Start: 2019-05-21 | End: 2019-08-02

## 2019-06-21 ENCOUNTER — PATIENT OUTREACH (OUTPATIENT)
Dept: CARE COORDINATION | Facility: CLINIC | Age: 59
End: 2019-06-21

## 2019-06-21 ENCOUNTER — TELEPHONE (OUTPATIENT)
Dept: FAMILY MEDICINE | Facility: CLINIC | Age: 59
End: 2019-06-21

## 2019-06-21 DIAGNOSIS — G89.29 CHRONIC LOW BACK PAIN, UNSPECIFIED BACK PAIN LATERALITY, WITH SCIATICA PRESENCE UNSPECIFIED: Primary | ICD-10-CM

## 2019-06-21 DIAGNOSIS — M54.5 CHRONIC LOW BACK PAIN, UNSPECIFIED BACK PAIN LATERALITY, WITH SCIATICA PRESENCE UNSPECIFIED: Primary | ICD-10-CM

## 2019-06-21 NOTE — PROGRESS NOTES
Clinic Care Coordination Contact  [facility]/Voicemail       Clinical Data: Care Coordinator Outreach  Outreach attempted x 1.  Left message on voicemail with call back information and requested return call.  Plan: Care Coordinator will mail out care coordination introduction letter with care coordinator contact information and explanation of care coordination services. Care Coordinator will try to reach patient again in 3-5 business days.      GERALD Ferguson   Primary Care Clinic- Social Work Care Coordinator  [facility]  6/21/2019 3:26 PM  [phone]

## 2019-06-21 NOTE — LETTER
[place] CARE COORDINATION  [facility]  [address] [place] [address]    June 21, 2019    Elaina Valenzuela  [address]  [place] [address]      Dear Elaina,    I am a clinic care coordinator who works with Jean Osorio MD at [facility]. I recently tried to call and was unable to reach you. I wanted to introduce myself and provide you with my contact information so that you can call me with questions or concerns about your health care. Below is a description of clinic care coordination and how I can further assist you. You had been working with Tanisha Romano, social work care coordinator in the past. I am following up with you.     The clinic care coordinator is a registered nurse and/or  who understand the health care system. The goal of clinic care coordination is to help you manage your health and improve access to the [place] system in the most efficient manner. The registered nurse can assist you in meeting your health care goals by providing education, coordinating services, and strengthening the communication among your providers. The  can assist you with financial, behavioral, psychosocial, chemical dependency, counseling, and/or psychiatric resources.    Please feel free to contact me at [phone], with any questions or concerns. We at [place] are focused on providing you with the highest-quality healthcare experience possible and that all starts with you.     Sincerely,     GERALD Ferguson   Primary Care Clinic- Social Work Care Coordinator  [facility]                                      [phone]      Enclosed: I have enclosed a copy of the Complex Care Plan. Please keep this in an easy to access place to use as needed.

## 2019-06-21 NOTE — TELEPHONE ENCOUNTER
Reason for Call:  Other Referral     Detailed comments: Patient had an appointment 5/17/19 and was given a referral for PT, but patient will like to get a referral for [facility]. ([phone])    Phone Number Patient can be reached at: Cell number on file:    Telephone Information:   Mobile [phone]       Best Time: anytime    Can we leave a detailed message on this number? YES    Call taken on 6/21/2019 at 10:04 AM by Beth Braun

## 2019-06-21 NOTE — LETTER
[facility]  Complex Care Plan  About Me:    Patient Name:  Elaina Valenzuela    YOB: 1960  Age:         58 year old   Araseli MRN:    [id number] Telephone Information:  Home Phone [phone]   Mobile [phone]       Address:  [address] [place] [address]  [place] [address] Email address:  No e-mail address on record      Emergency Contact(s)    Name Relationship Lgl Grd Work Phone Home Phone Mobile Phone   1. BENOIT VALENZUELA* Spouse   [phone]    2. NO SECONDARY     none            Primary language:  English     needed? No   [facility] [employer] [facility]:  [phone] op. 1  Other communication barriers:    Preferred Method of Communication:  Mail  Current living arrangement:    Mobility Status/ Medical Equipment:      Health Maintenance  Health Maintenance Reviewed:      My Access Plan  Medical Emergency 911   Primary Clinic Line [facility] - [phone]   24 Hour Appointment Line [phone] or  [phone]) (toll-free)   24 Hour Nurse Line [phone] (toll-free)   Preferred Urgent Care     Preferred Hospital     Preferred [facility]     Behavioral Health Crisis Line The National Suicide Prevention Lifeline at 1-176.583.8058 or 911             My Care Team Members  Patient Care Team       Relationship Specialty Notifications Start End    Jean Osorio MD PCP - General Family Practice  4/11/19     Phone: [phone] Fax: [phone] [address] [address]    Libby Baker MD Assigned PCP   4/26/19     Kelly Sofia LSW Lead Care Coordinator Primary Care - CC  5/15/19     Phone: [phone]                 My Care Plans  Self Management and Treatment Plan  Goals and (Comments)  Goals        General    Financial Wellbeing (pt-stated)     Notes - Note created  4/17/2019  4:14 PM by Tanisha Romano LSW    Goal Statement: I would like to learn about the  financial resources available to me  Measure of Success: Whether or not I am able to qualify  Supportive Steps to Achieve: [facility] to send me resources and I will apply for the ones that I am interested in.   Barriers: None noted.   Strengths: Pt is willing and able to apply for assistance.   Date to Achieve By: July 2019  Patient expressed understanding of goal: yes                 Action Plans on File:   Asthma        Depression          Advance Care Plans/Directives Type:        My Medical and Care Information  Problem List   Patient Active Problem List   Diagnosis     Chronic low back pain     CARDIOVASCULAR SCREENING; LDL GOAL LESS THAN 130     Urinary retention     Neurogenic bladder     Moderate major depression (H)     Mild intermittent asthma without complication     Erectile dysfunction, unspecified erectile dysfunction type     Benign essential hypertension     Obesity due to excess calories, unspecified obesity severity     S/P lumbar fusion     Muscle weakness of lower extremity     Spinal meningioma (H)     Paraplegia (H)      Current Medications and Allergies:  See printed Medication Report.    Care Coordination Start Date: 4/17/2019   Frequency of Care Coordination: monthly   Form Last Updated: 06/21/2019

## 2019-06-21 NOTE — TELEPHONE ENCOUNTER
Called patient left message that a referral was faxed to [facility].  Vanesa Treviño  Team Melissa,

## 2019-06-25 ENCOUNTER — TRANSFERRED RECORDS (OUTPATIENT)
Dept: HEALTH INFORMATION MANAGEMENT | Facility: CLINIC | Age: 59
End: 2019-06-25

## 2019-07-01 NOTE — PROGRESS NOTES
Clinic Care Coordination Contact  [facility]/Voicemail       Clinical Data: Care Coordinator Outreach  Outreach attempted x 2.  Left message on voicemail with call back information and requested return call.  Plan: Care Coordinator mailed out care coordination introduction letter on 6/21/19. Care Coordinator will do no further outreaches. Will keep on Care Team another couple weeks in case calls back.       GERALD Ferguson   Primary Care Clinic- Social Work Care Coordinator  [facility]  7/1/2019 2:03 PM  [phone]

## 2019-07-09 ENCOUNTER — TRANSFERRED RECORDS (OUTPATIENT)
Dept: HEALTH INFORMATION MANAGEMENT | Facility: CLINIC | Age: 59
End: 2019-07-09

## 2019-07-18 NOTE — PROGRESS NOTES
Clinic Care Coordination Contact    Situation: Patient chart reviewed by care coordinator.    Background: Previous  CC for [facility] had been working with pt with some financial resources and transportation resources.     Assessment: Gave him [employer] application and a [facility] referral, [facility] leena to see about pt getting CADI waiver for further assistance. Have tried to reach pt with no success.     Plan/Recommendations: Have not heard back from pt. Will close to CC.     GERALD Ferguson   Primary Care Clinic- Social Work Care Coordinator  [facility]  7/18/2019 11:00 AM  [phone]

## 2019-07-30 ENCOUNTER — TRANSFERRED RECORDS (OUTPATIENT)
Dept: HEALTH INFORMATION MANAGEMENT | Facility: CLINIC | Age: 59
End: 2019-07-30

## 2019-08-02 ENCOUNTER — OFFICE VISIT (OUTPATIENT)
Dept: FAMILY MEDICINE | Facility: CLINIC | Age: 59
End: 2019-08-02
Payer: COMMERCIAL

## 2019-08-02 VITALS
SYSTOLIC BLOOD PRESSURE: 124 MMHG | RESPIRATION RATE: 18 BRPM | DIASTOLIC BLOOD PRESSURE: 86 MMHG | HEART RATE: 79 BPM | TEMPERATURE: 96.7 F | OXYGEN SATURATION: 92 %

## 2019-08-02 DIAGNOSIS — G89.29 CHRONIC MIDLINE LOW BACK PAIN WITHOUT SCIATICA: ICD-10-CM

## 2019-08-02 DIAGNOSIS — G60.9 IDIOPATHIC PERIPHERAL NEUROPATHY: ICD-10-CM

## 2019-08-02 DIAGNOSIS — I10 ESSENTIAL HYPERTENSION WITH GOAL BLOOD PRESSURE LESS THAN 140/90: ICD-10-CM

## 2019-08-02 DIAGNOSIS — F33.1 MAJOR DEPRESSIVE DISORDER, RECURRENT EPISODE, MODERATE (H): ICD-10-CM

## 2019-08-02 DIAGNOSIS — G82.20 PARAPLEGIA (H): Primary | ICD-10-CM

## 2019-08-02 DIAGNOSIS — M54.50 CHRONIC MIDLINE LOW BACK PAIN WITHOUT SCIATICA: ICD-10-CM

## 2019-08-02 DIAGNOSIS — E78.5 HYPERLIPIDEMIA LDL GOAL <100: ICD-10-CM

## 2019-08-02 PROCEDURE — 99207 C PAF COMPLETED  NO CHARGE: CPT | Performed by: FAMILY MEDICINE

## 2019-08-02 PROCEDURE — 99214 OFFICE O/P EST MOD 30 MIN: CPT | Performed by: FAMILY MEDICINE

## 2019-08-02 RX ORDER — AMITRIPTYLINE HYDROCHLORIDE 100 MG/1
TABLET ORAL
Qty: 90 TABLET | Refills: 3 | Status: SHIPPED | OUTPATIENT
Start: 2019-08-02 | End: 2019-12-03

## 2019-08-02 RX ORDER — METOPROLOL TARTRATE 100 MG
TABLET ORAL
Qty: 180 TABLET | Refills: 2 | Status: SHIPPED | OUTPATIENT
Start: 2019-08-02 | End: 2020-06-30

## 2019-08-02 RX ORDER — GABAPENTIN 300 MG/1
600 CAPSULE ORAL 3 TIMES DAILY
Qty: 360 CAPSULE | Refills: 1 | Status: SHIPPED | OUTPATIENT
Start: 2019-08-02 | End: 2020-04-01

## 2019-08-02 RX ORDER — NAPROXEN 500 MG/1
500 TABLET ORAL 2 TIMES DAILY PRN
Qty: 180 TABLET | Refills: 1 | Status: ON HOLD | OUTPATIENT
Start: 2019-08-02 | End: 2019-09-18

## 2019-08-02 RX ORDER — ATORVASTATIN CALCIUM 20 MG/1
20 TABLET, FILM COATED ORAL DAILY
Qty: 90 TABLET | Refills: 1 | Status: SHIPPED | OUTPATIENT
Start: 2019-08-02 | End: 2020-04-28

## 2019-08-02 RX ORDER — HYDROCHLOROTHIAZIDE 25 MG/1
TABLET ORAL
Qty: 90 TABLET | Refills: 1 | Status: SHIPPED | OUTPATIENT
Start: 2019-08-02 | End: 2019-10-25

## 2019-08-02 NOTE — PROGRESS NOTES
Lashonda Valenzuela is a 58 year old male who presents to clinic today for the following health issues:    HPI   Chief Complaint   Patient presents with     RECHECK     Since starting PT has been decreasing and was informed before next PT appointment needs to see provider     Refill Request     Patient has been going to PT.  Last week at therapy therapist noticed acute worsening of lower extremity weakness and requested that he follow-up in clinic.  Patient has a neurosurgery appointment 8/8    Urinary incontinence - patient states has improved.  Has been taking flomax.  Concern for neurogenic bladder.  Patient has yet to see urology.    Depression - increased dose of celexa has been effective for him.    HTN - well controlled, asymptomatic, low sodium diet, taking medications as prescribed      BP Readings from Last 3 Encounters:   08/02/19 124/86   05/17/19 138/84   07/23/18 120/74    Wt Readings from Last 3 Encounters:   03/12/18 81.2 kg (179 lb)   10/10/17 87.5 kg (193 lb)   12/22/16 88.9 kg (196 lb)        Reviewed and updated as needed this visit by Provider  Tobacco  Allergies  Meds  Problems  Med Hx  Surg Hx  Fam Hx         Review of Systems   ROS COMP: Constitutional, HEENT, cardiovascular, pulmonary, gi and gu systems are negative, except as otherwise noted.      Objective    /86   Pulse 79   Temp 96.7  F (35.9  C) (Oral)   Resp 18   SpO2 92%   There is no height or weight on file to calculate BMI.  Physical Exam   GENERAL: healthy, alert and no distress  EYES: Eyes grossly normal to inspection, PERRL and conjunctivae and sclerae normal  NECK: no adenopathy, no asymmetry, masses, or scars and thyroid normal to palpation  MS: bilateral lower extremity extensor weakness  SKIN: no suspicious lesions or rashes  NEURO: lower extremity DTR normal  PSYCH: mentation appears normal, affect normal/bright    Diagnostic Test Results:  Labs reviewed in Epic        Assessment & Plan        ICD-10-CM    1. Paraplegia (H) G82.20 PAF COMPLETED     MR Lumbar Spine w/o Contrast   2. Essential hypertension with goal blood pressure less than 140/90 I10 PAF COMPLETED     atorvastatin (LIPITOR) 20 MG tablet     metoprolol tartrate (LOPRESSOR) 100 MG tablet     hydrochlorothiazide (HYDRODIURIL) 25 MG tablet   3. Chronic midline low back pain without sciatica M54.5 PAF COMPLETED    G89.29 naproxen (NAPROSYN) 500 MG tablet     MR Lumbar Spine w/o Contrast     amitriptyline (ELAVIL) 100 MG tablet   4. Idiopathic peripheral neuropathy G60.9 PAF COMPLETED     gabapentin (NEURONTIN) 300 MG capsule   5. Major depressive disorder, recurrent episode, moderate (H) F33.1 PAF COMPLETED     amitriptyline (ELAVIL) 100 MG tablet   6. Hyperlipidemia LDL goal <100 E78.5 PAF COMPLETED     atorvastatin (LIPITOR) 20 MG tablet          Patient Instructions   Elaina     Here's the plan:    1. New leg weakness - we will have you go for a repeat MRI to assess you lumbar spine.  Please make sure to follow-up with neurosurgery on 8/8/19.    2.  Urinary incontinence - this has improved.  Please take your medication as you are doing.    3. Depression - well controlled at this time.  No need to change your celexa dose.  Refills placed.    4. Hypertension - well controlled, low sodium diet, weight loss, medications refilled    5. Colon cancer screening - please mail in your cologuard test as soon as you receive it at home.    Rosas Escalante MD       Return in about 3 months (around 11/2/2019) for For Re-Assessment.    Rosas Escalante MD  [facility]

## 2019-08-02 NOTE — PATIENT INSTRUCTIONS
Elaina     Here's the plan:    1. New leg weakness - we will have you go for a repeat MRI to assess you lumbar spine.  Please make sure to follow-up with neurosurgery on 8/8/19.    2.  Urinary incontinence - this has improved.  Please take your medication as you are doing.    3. Depression - well controlled at this time.  No need to change your celexa dose.  Refills placed.    4. Hypertension - well controlled, low sodium diet, weight loss, medications refilled    5. Colon cancer screening - please mail in your cologuard test as soon as you receive it at home.    Rosas Escalante MD

## 2019-08-06 ENCOUNTER — ANCILLARY PROCEDURE (OUTPATIENT)
Dept: MRI IMAGING | Facility: CLINIC | Age: 59
End: 2019-08-06
Attending: FAMILY MEDICINE
Payer: COMMERCIAL

## 2019-08-06 DIAGNOSIS — M54.50 CHRONIC MIDLINE LOW BACK PAIN WITHOUT SCIATICA: ICD-10-CM

## 2019-08-06 DIAGNOSIS — G89.29 CHRONIC MIDLINE LOW BACK PAIN WITHOUT SCIATICA: ICD-10-CM

## 2019-08-06 DIAGNOSIS — G82.20 PARAPLEGIA (H): ICD-10-CM

## 2019-08-06 PROCEDURE — A9585 GADOBUTROL INJECTION: HCPCS

## 2019-08-06 PROCEDURE — 72158 MRI LUMBAR SPINE W/O & W/DYE: CPT | Mod: TC

## 2019-08-06 RX ORDER — GADOBUTROL 604.72 MG/ML
10 INJECTION INTRAVENOUS ONCE
Status: COMPLETED | OUTPATIENT
Start: 2019-08-06 | End: 2019-08-06

## 2019-08-06 RX ADMIN — GADOBUTROL 7.5 ML: 604.72 INJECTION INTRAVENOUS at 13:42

## 2019-08-08 ENCOUNTER — OFFICE VISIT (OUTPATIENT)
Dept: NEUROSURGERY | Facility: CLINIC | Age: 59
End: 2019-08-08
Payer: COMMERCIAL

## 2019-08-08 VITALS
OXYGEN SATURATION: 94 % | TEMPERATURE: 97.7 F | HEART RATE: 65 BPM | SYSTOLIC BLOOD PRESSURE: 120 MMHG | RESPIRATION RATE: 18 BRPM | DIASTOLIC BLOOD PRESSURE: 79 MMHG

## 2019-08-08 DIAGNOSIS — R29.898 LEG WEAKNESS, BILATERAL: Primary | ICD-10-CM

## 2019-08-08 DIAGNOSIS — Z98.1 S/P LUMBAR FUSION: ICD-10-CM

## 2019-08-08 DIAGNOSIS — Z98.890 S/P LAMINECTOMY: ICD-10-CM

## 2019-08-08 PROCEDURE — 99204 OFFICE O/P NEW MOD 45 MIN: CPT | Performed by: PHYSICIAN ASSISTANT

## 2019-08-08 ASSESSMENT — PAIN SCALES - GENERAL: PAINLEVEL: NO PAIN (0)

## 2019-08-08 NOTE — PATIENT INSTRUCTIONS
1. Orders for thoracic and cervical MRI's, please call [phone] to schedule, we will contact you with the results.    Please call our clinic with any questions or concerns: [phone]

## 2019-08-08 NOTE — LETTER
8/8/2019         RE: Elaina Valenzuela  [address]  [place] [address]        Dear Colleague,    Thank you for referring your patient, Elaina Valenzuela, to the [facility]. Please see a copy of my visit note below.    Dr. Reed Munguia  [facility]  Neurosurgery Clinic Visit      CC: Back pain, left leg numbness, gait issues    Primary care Provider: [facility]      Reason For Visit:   I was asked by Rosas [place] MD to consult on the patient for paraplegia, s/p lumbar fusion, and spinal meningioma.      HPI: Elaina Valenzuela is a 58 year old male with history of L4-5 fusion and T8-9 laminectomy for resection of intradural meningioma who presents for evaluation of low back pain and bilateral leg weakness. Pain is located in mid low back and he has numbness in bilateral entire legs down to the feet. Describes the pain as a constant numbness. He does have baseline left leg weakness that has been present since his surgery, but over the last 1-2 years has developed right leg weakness that has progressively worsened to the point where he is wheel chair bound. He has been doing PT at [facility] and was advised to be evaluated as he is not improving.  Denies bladder/bowel incontinence; this has resolved from his prior surgery.    Current pain: 0/10 At worst: 6-7/10    Past Medical History:   Diagnosis Date     Chronic back pain      HTN (hypertension)      Left leg weakness      Mild intermittent asthma      Neurogenic bladder        Past Medical History reviewed with patient during visit.    Past Surgical History:   Procedure Laterality Date     C NONSPECIFIC PROCEDURE      Rt. shoulder cyst removed, at [facility]     COLONOSCOPY  5/2016    repeat in 1 year due to very poor prep     COLONOSCOPY N/A 5/19/2016    Procedure: COLONOSCOPY;  Surgeon: Jean Guo MD;  Location: [facility] OR     COLONOSCOPY WITH CO2 INSUFFLATION N/A 5/19/2016    Procedure:  COLONOSCOPY WITH CO2 INSUFFLATION;  Surgeon: Jean Guo MD;  Location: [facility] OR     LAMINECT/DISCECTOMY, LUMBAR  3/19/2010    L4-5 herniated disc with severe left leg neruopathy      SURGICAL HISTORY OF -   3/23/2010    removal of meningial tumor of thoracic spine     Past Surgical History reviewed with patient during visit.    Current Outpatient Medications   Medication     amitriptyline (ELAVIL) 100 MG tablet     atorvastatin (LIPITOR) 20 MG tablet     BENEFIBER DRINK MIX OR POWD     citalopram (CELEXA) 20 MG tablet     gabapentin (NEURONTIN) 300 MG capsule     hydrochlorothiazide (HYDRODIURIL) 25 MG tablet     metoprolol tartrate (LOPRESSOR) 100 MG tablet     naproxen (NAPROSYN) 500 MG tablet     tamsulosin (FLOMAX) 0.4 MG capsule     TYLENOL EXTRA STRENGTH 500 MG PO TABS     No current facility-administered medications for this visit.        Allergies   Allergen Reactions     Penicillins Rash       Social History     Socioeconomic History     Marital status:      Spouse name: Not on file     Number of children: Not on file     Years of education: Not on file     Highest education level: Not on file   Occupational History     Not on file   Social Needs     Financial resource strain: Not on file     Food insecurity:     Worry: Not on file     Inability: Not on file     Transportation needs:     Medical: Not on file     Non-medical: Not on file   Tobacco Use     Smoking status: Never Smoker     Smokeless tobacco: Never Used   Substance and Sexual Activity     Alcohol use: Yes     Comment: rare     Drug use: No     Sexual activity: Never   Lifestyle     Physical activity:     Days per week: Not on file     Minutes per session: Not on file     Stress: Not on file   Relationships     Social connections:     Talks on phone: Not on file     Gets together: Not on file     Attends Mandaeism service: Not on file     Active member of club or organization: Not on file     Attends meetings of clubs or  organizations: Not on file     Relationship status: Not on file     Intimate partner violence:     Fear of current or ex partner: Not on file     Emotionally abused: Not on file     Physically abused: Not on file     Forced sexual activity: Not on file   Other Topics Concern     Parent/sibling w/ CABG, MI or angioplasty before 65F 55M? No   Social History Narrative     Not on file       Family History   Problem Relation Age of Onset     Hypertension Mother           ROS: 10 point ROS neg other than the symptoms noted above in the HPI.    Vital Signs: There were no vitals taken for this visit.    Examination:  Constitutional:  Alert, well nourished, NAD.  HEENT: Normocephalic, atraumatic.   Pulmonary:  Without shortness of breath, normal effort.   Lymph: no lymphadenopathy to low back or LE.   Integumentary: Skin is free of rashes or lesions.   Cardiovascular:  No pitting edema of BLE.      Neurological:  Awake  Alert  Oriented x 3  Speech clear  Cranial nerves II - XII grossly intact  PERRL  EOMI  Face symmetric  Tongue midline  Motor exam   Hip Flexor:                Right: 1/5  Left:  1/5  Hip Adductor:             Right:  1/5  Left:  1/5  Hip Abductor:             Right:  1/5  Left:  1/5  Gastroc Soleus:        Right:  3/5  Left:  3/5  Tib/Ant:                      Right:  3/5  Left:  3/5  EHL:                          Right:  3/5  Left:  3/5       Sensation decreased to bilateral lower extremities; distal worse than proximal    Reflexes are 1+ in the patellar and Achilles. There is no clonus.   Musculoskeletal:  Gait: Wheelchair bound  Lumbar examination reveals no tenderness of the spine or paraspinous muscles.  Hip height is symmetrical. Negative SI joint, sciatic notch or greater trochanteric tenderness to palpation bilaterally.  Straight leg raise is negative bilaterally.      Imaging:   MRI of the lumbar spine from 8/6/2019 was reviewed in the office today. Reveals stable L4-5 fusion and severe bilateral  facet changes at L5-S1.    Assessment/Plan:   Elaina Valenzuela is a 58 year old male with history of L4-5 fusion and T8-9 laminectomy for resection of intradural meningioma who presents for evaluation of low back pain and bilateral leg weakness. Pain is located in mid low back and he has numbness in bilateral entire legs down to the feet. Describes the pain as a constant numbness. He does have baseline left leg weakness that has been present since his surgery, but over the last 1-2 years has developed right leg weakness that has progressively worsened to the point where he is wheel chair bound. He has been doing PT at [facility] and was advised to be evaluated as he is not improving. Lumbar MRI reviewed in office without evidence of cause of proximal LE weakness. Given his history of prior thoracic surgery and proximal LE weakness, will obtain cervical and thoracic MRIs and call him with results. Patient voiced understanding and agreement.           Juliana Cardenas PA-C  [facility]  [address] [address]    Tel [phone]  Pager [phone]      Again, thank you for allowing me to participate in the care of your patient.        Sincerely,        Juliana Cardenas PA-C

## 2019-08-08 NOTE — NURSING NOTE
"Elaina Valenzuela is a 58 year old male who presents for:  Chief Complaint   Patient presents with     Back Pain        Initial Vitals:  /79   Pulse 65   Temp 97.7  F (36.5  C) (Oral)   Resp 18   SpO2 94%  Estimated body mass index is 29.79 kg/m  as calculated from the following:    Height as of 7/23/18: 5' 5\" (1.651 m).    Weight as of 3/12/18: 179 lb (81.2 kg).. There is no height or weight on file to calculate BSA. BP completed using cuff size: regular  No Pain (0)        Nursing Comments: low back pain and LE weakness new on-set of right leg since 2018        Lilian Angulo    "

## 2019-08-08 NOTE — PROGRESS NOTES
Dr. Reed Munguia  [facility]  Neurosurgery Clinic Visit      CC: Back pain, left leg numbness, gait issues    Primary care Provider: Araseli Patton      Reason For Visit:   I was asked by Rosas Flores MD to consult on the patient for paraplegia, s/p lumbar fusion, and spinal meningioma.      HPI: Elaina Valenzuela is a 58 year old male with history of L4-5 fusion and T8-9 laminectomy for resection of intradural meningioma who presents for evaluation of low back pain and bilateral leg weakness. Pain is located in mid low back and he has numbness in bilateral entire legs down to the feet. Describes the pain as a constant numbness. He does have baseline left leg weakness that has been present since his surgery, but over the last 1-2 years has developed right leg weakness that has progressively worsened to the point where he is wheel chair bound. He has been doing PT at [facility] and was advised to be evaluated as he is not improving.  Denies bladder/bowel incontinence; this has resolved from his prior surgery.    Current pain: 0/10 At worst: 6-7/10    Past Medical History:   Diagnosis Date     Chronic back pain      HTN (hypertension)      Left leg weakness      Mild intermittent asthma      Neurogenic bladder        Past Medical History reviewed with patient during visit.    Past Surgical History:   Procedure Laterality Date     C NONSPECIFIC PROCEDURE      Rt. shoulder cyst removed, at [facility]     COLONOSCOPY  5/2016    repeat in 1 year due to very poor prep     COLONOSCOPY N/A 5/19/2016    Procedure: COLONOSCOPY;  Surgeon: Jean Guo MD;  Location: [facility] OR     COLONOSCOPY WITH CO2 INSUFFLATION N/A 5/19/2016    Procedure: COLONOSCOPY WITH CO2 INSUFFLATION;  Surgeon: Jean Guo MD;  Location: [facility] OR     LAMINECT/DISCECTOMY, LUMBAR  3/19/2010    L4-5 herniated disc with severe left leg neruopathy      SURGICAL HISTORY OF -   3/23/2010    removal of meningial  tumor of thoracic spine     Past Surgical History reviewed with patient during visit.    Current Outpatient Medications   Medication     amitriptyline (ELAVIL) 100 MG tablet     atorvastatin (LIPITOR) 20 MG tablet     BENEFIBER DRINK MIX OR POWD     citalopram (CELEXA) 20 MG tablet     gabapentin (NEURONTIN) 300 MG capsule     hydrochlorothiazide (HYDRODIURIL) 25 MG tablet     metoprolol tartrate (LOPRESSOR) 100 MG tablet     naproxen (NAPROSYN) 500 MG tablet     tamsulosin (FLOMAX) 0.4 MG capsule     TYLENOL EXTRA STRENGTH 500 MG PO TABS     No current facility-administered medications for this visit.        Allergies   Allergen Reactions     Penicillins Rash       Social History     Socioeconomic History     Marital status:      Spouse name: Not on file     Number of children: Not on file     Years of education: Not on file     Highest education level: Not on file   Occupational History     Not on file   Social Needs     Financial resource strain: Not on file     Food insecurity:     Worry: Not on file     Inability: Not on file     Transportation needs:     Medical: Not on file     Non-medical: Not on file   Tobacco Use     Smoking status: Never Smoker     Smokeless tobacco: Never Used   Substance and Sexual Activity     Alcohol use: Yes     Comment: rare     Drug use: No     Sexual activity: Never   Lifestyle     Physical activity:     Days per week: Not on file     Minutes per session: Not on file     Stress: Not on file   Relationships     Social connections:     Talks on phone: Not on file     Gets together: Not on file     Attends Evangelical service: Not on file     Active member of club or organization: Not on file     Attends meetings of clubs or organizations: Not on file     Relationship status: Not on file     Intimate partner violence:     Fear of current or ex partner: Not on file     Emotionally abused: Not on file     Physically abused: Not on file     Forced sexual activity: Not on file    Other Topics Concern     Parent/sibling w/ CABG, MI or angioplasty before 65F 55M? No   Social History Narrative     Not on file       Family History   Problem Relation Age of Onset     Hypertension Mother           ROS: 10 point ROS neg other than the symptoms noted above in the HPI.    Vital Signs: There were no vitals taken for this visit.    Examination:  Constitutional:  Alert, well nourished, NAD.  HEENT: Normocephalic, atraumatic.   Pulmonary:  Without shortness of breath, normal effort.   Lymph: no lymphadenopathy to low back or LE.   Integumentary: Skin is free of rashes or lesions.   Cardiovascular:  No pitting edema of BLE.      Neurological:  Awake  Alert  Oriented x 3  Speech clear  Cranial nerves II - XII grossly intact  PERRL  EOMI  Face symmetric  Tongue midline  Motor exam   Hip Flexor:                Right: 1/5  Left:  1/5  Hip Adductor:             Right:  1/5  Left:  1/5  Hip Abductor:             Right:  1/5  Left:  1/5  Gastroc Soleus:        Right:  3/5  Left:  3/5  Tib/Ant:                      Right:  3/5  Left:  3/5  EHL:                          Right:  3/5  Left:  3/5       Sensation decreased to bilateral lower extremities; distal worse than proximal    Reflexes are 1+ in the patellar and Achilles. There is no clonus.   Musculoskeletal:  Gait: Wheelchair bound  Lumbar examination reveals no tenderness of the spine or paraspinous muscles.  Hip height is symmetrical. Negative SI joint, sciatic notch or greater trochanteric tenderness to palpation bilaterally.  Straight leg raise is negative bilaterally.      Imaging:   MRI of the lumbar spine from 8/6/2019 was reviewed in the office today. Reveals stable L4-5 fusion and severe bilateral facet changes at L5-S1.    Assessment/Plan:   Elaina Valenzuela is a 58 year old male with history of L4-5 fusion and T8-9 laminectomy for resection of intradural meningioma who presents for evaluation of low back pain and bilateral leg weakness. Pain is  located in mid low back and he has numbness in bilateral entire legs down to the feet. Describes the pain as a constant numbness. He does have baseline left leg weakness that has been present since his surgery, but over the last 1-2 years has developed right leg weakness that has progressively worsened to the point where he is wheel chair bound. He has been doing PT at [facility] and was advised to be evaluated as he is not improving. Lumbar MRI reviewed in office without evidence of cause of proximal LE weakness. Given his history of prior thoracic surgery and proximal LE weakness, will obtain cervical and thoracic MRIs and call him with results. Patient voiced understanding and agreement.           Juliana Cardenas PA-C  [facility]  [address] [address]    Tel [phone]  Pager [phone]

## 2019-08-21 ENCOUNTER — ANCILLARY PROCEDURE (OUTPATIENT)
Dept: MRI IMAGING | Facility: CLINIC | Age: 59
End: 2019-08-21
Attending: PHYSICIAN ASSISTANT
Payer: COMMERCIAL

## 2019-08-21 DIAGNOSIS — R29.898 LEG WEAKNESS, BILATERAL: ICD-10-CM

## 2019-08-21 DIAGNOSIS — Z98.890 S/P LAMINECTOMY: ICD-10-CM

## 2019-08-21 PROCEDURE — A9585 GADOBUTROL INJECTION: HCPCS | Mod: JW | Performed by: PHYSICIAN ASSISTANT

## 2019-08-21 PROCEDURE — 72157 MRI CHEST SPINE W/O & W/DYE: CPT | Performed by: RADIOLOGY

## 2019-08-21 PROCEDURE — 72156 MRI NECK SPINE W/O & W/DYE: CPT | Performed by: RADIOLOGY

## 2019-08-21 RX ORDER — GADOBUTROL 604.72 MG/ML
10 INJECTION INTRAVENOUS ONCE
Status: COMPLETED | OUTPATIENT
Start: 2019-08-21 | End: 2019-08-21

## 2019-08-21 RX ADMIN — GADOBUTROL 8.5 ML: 604.72 INJECTION INTRAVENOUS at 15:19

## 2019-08-22 ENCOUNTER — TELEPHONE (OUTPATIENT)
Dept: NEUROSURGERY | Facility: CLINIC | Age: 59
End: 2019-08-22

## 2019-08-22 NOTE — TELEPHONE ENCOUNTER
Called and informed patient of MRI results per Juliana OJEDA. he has severe stenosis in his thoracic spine and get him scheduled to follow up with Dr. Munguia.    Appointment scheduled

## 2019-08-29 ENCOUNTER — OFFICE VISIT (OUTPATIENT)
Dept: NEUROSURGERY | Facility: CLINIC | Age: 59
End: 2019-08-29
Payer: COMMERCIAL

## 2019-08-29 VITALS — SYSTOLIC BLOOD PRESSURE: 127 MMHG | TEMPERATURE: 97 F | DIASTOLIC BLOOD PRESSURE: 79 MMHG

## 2019-08-29 DIAGNOSIS — M47.14 THORACIC MYELOPATHY: Primary | ICD-10-CM

## 2019-08-29 PROCEDURE — 99213 OFFICE O/P EST LOW 20 MIN: CPT | Performed by: NEUROLOGICAL SURGERY

## 2019-08-29 ASSESSMENT — PAIN SCALES - GENERAL: PAINLEVEL: MODERATE PAIN (5)

## 2019-08-29 NOTE — PATIENT INSTRUCTIONS
CT thoracic to be done prior to surgery. We will help coordinate that appointment.    Surgery scheduled at [facility] for T9-10 Laminectomy    Pre-Operative:  -Surgical risks: blood clots in the leg or lung, problems urinating, nerve damage, drainage from the incision, infection, stiffness  - Pre-operative physical with primary care physician within 30 days of surgical date.   -Stop all foods and liquids 8 hours prior to surgery.  -Shower procedure: please shower with antibacterial soap the night before surgery and morning of surgery. Refer to information sheet in folder.   - Discontinue Aspirin, NSAIDs (Advil, Ibuprofen, Naproxen, Nuprin, Diclofenac, Meloxicam, Aleve, Celebrex) x 7 days prior to surgical date. After surgery, do not begin taking these medications until given clearance.  - May try Tylenol for pain.    Post-Operative:  -Hospital stay: likely same day procedure with discharge home day of surgery, may stay for 23 hour observation hospitalization for monitoring.  - Post operative pain  will require pain medications and muscle relaxants. You will receive medication upon discharge.  -Do NOT drive while taking narcotic pain medication.  -Post operative incision care-    -Watch for signs of infection: redness, swelling, warmth, drainage, and fever of 101 degrees or higher. Notify clinic [phone].   -Keep incision clean and dry. You may shower. No submerging incision in water such as pools, hot tubs, baths for at least 8 weeks or until incision is healed.   - Post operative activity limitations for 4-6 weeks after surgery: no lifting > 10 pounds, limited bending, twisting, or overhead reaching. You will be re-evaluated at your follow up appointments.   -If you are currently employed, you will need to be off work for recovery and healing. Please fax any FMLA/short term disability paperwork to [phone]. You may call our clinic when you'd like to return to work and we can provide a work  letter.   - Follow up appointments: 6 week post op and 3 months post op. Please call to schedule follow up appointment at [phone].

## 2019-08-29 NOTE — NURSING NOTE
"Elaina Valenzuela is a 58 year old male who presents for:  Chief Complaint   Patient presents with     Neurologic Problem     follow up on spinal stenosis        Initial Vitals:  /79   Temp 97  F (36.1  C)  Estimated body mass index is 29.79 kg/m  as calculated from the following:    Height as of 7/23/18: 5' 5\" (1.651 m).    Weight as of 3/12/18: 179 lb (81.2 kg).. There is no height or weight on file to calculate BSA. BP completed using cuff size: regular  Moderate Pain (5)        Nursing Comments: follow up on spinal stenosis        Nataly Villafuerte RN    "

## 2019-08-29 NOTE — PROGRESS NOTES
Elaina Valenzuela is a 58 year old male with history of L4-5 fusion and T8-9 laminectomy for resection of intradural meningioma who presents for evaluation of low back pain and bilateral leg weakness. Pain is located in mid low back and he has numbness in bilateral entire legs down to the feet. Describes the pain as a constant numbness. He does have baseline left leg weakness that has been present since his surgery, but over the last 1-2 years has developed right leg weakness that has progressively worsened to the point where he is wheel chair bound. He has been doing PT at [facility] and was advised to be evaluated as he is not improving.  Denies bladder/bowel incontinence; this has resolved from his prior surgery.    Underwent L4-5 TLIF for severe leg weakness in 2011, then developed worsening symptoms and urinary retention, found to have T8-9 meningioma and underwent laminectomy and resection for this.  Was ambulatory afterwards, but now with severe progressive weakness over the last year.  Nearly no LE function at this point, and wheelchair bound.  We obtained new MR Thoracic which shows critical stenosis at T9-10.  MR Cervical with moderate stenosis, but no arm symptoms.    Past Medical History:   Diagnosis Date     Chronic back pain      HTN (hypertension)      Left leg weakness      Mild intermittent asthma      Neurogenic bladder        Past Medical History reviewed with patient during visit.    Past Surgical History:   Procedure Laterality Date     C NONSPECIFIC PROCEDURE      Rt. shoulder cyst removed, at [place]     COLONOSCOPY  5/2016    repeat in 1 year due to very poor prep     COLONOSCOPY N/A 5/19/2016    Procedure: COLONOSCOPY;  Surgeon: Jean Guo MD;  Location:  OR     COLONOSCOPY WITH CO2 INSUFFLATION N/A 5/19/2016    Procedure: COLONOSCOPY WITH CO2 INSUFFLATION;  Surgeon: Jean Guo MD;  Location:  OR     LAMINECT/DISCECTOMY, LUMBAR  3/19/2010    L4-5 herniated disc  with severe left leg neruopathy      SURGICAL HISTORY OF -   3/23/2010    removal of meningial tumor of thoracic spine     Past Surgical History reviewed with patient during visit.    Current Outpatient Medications   Medication     amitriptyline (ELAVIL) 100 MG tablet     atorvastatin (LIPITOR) 20 MG tablet     BENEFIBER DRINK MIX OR POWD     citalopram (CELEXA) 20 MG tablet     gabapentin (NEURONTIN) 300 MG capsule     hydrochlorothiazide (HYDRODIURIL) 25 MG tablet     metoprolol tartrate (LOPRESSOR) 100 MG tablet     naproxen (NAPROSYN) 500 MG tablet     tamsulosin (FLOMAX) 0.4 MG capsule     TYLENOL EXTRA STRENGTH 500 MG PO TABS     No current facility-administered medications for this visit.        Allergies   Allergen Reactions     Penicillins Rash       Social History     Socioeconomic History     Marital status:      Spouse name: Not on file     Number of children: Not on file     Years of education: Not on file     Highest education level: Not on file   Occupational History     Not on file   Social Needs     Financial resource strain: Not on file     Food insecurity:     Worry: Not on file     Inability: Not on file     Transportation needs:     Medical: Not on file     Non-medical: Not on file   Tobacco Use     Smoking status: Never Smoker     Smokeless tobacco: Never Used   Substance and Sexual Activity     Alcohol use: Yes     Comment: rare     Drug use: No     Sexual activity: Never   Lifestyle     Physical activity:     Days per week: Not on file     Minutes per session: Not on file     Stress: Not on file   Relationships     Social connections:     Talks on phone: Not on file     Gets together: Not on file     Attends Jewish service: Not on file     Active member of club or organization: Not on file     Attends meetings of clubs or organizations: Not on file     Relationship status: Not on file     Intimate partner violence:     Fear of current or ex partner: Not on file     Emotionally  abused: Not on file     Physically abused: Not on file     Forced sexual activity: Not on file   Other Topics Concern     Parent/sibling w/ CABG, MI or angioplasty before 65F 55M? No   Social History Narrative     Not on file       Family History   Problem Relation Age of Onset     Hypertension Mother           ROS: 10 point ROS neg other than the symptoms noted above in the HPI.    Vital Signs: /79   Temp 97  F (36.1  C)     Examination:  Constitutional:  Alert, well nourished, NAD.  HEENT: Normocephalic, atraumatic.   Pulmonary:  Without shortness of breath, normal effort.   Lymph: no lymphadenopathy to low back or LE.   Integumentary: Skin is free of rashes or lesions.   Cardiovascular:  No pitting edema of BLE.      Neurological:  Awake  Alert  Oriented x 3  Speech clear  Cranial nerves II - XII grossly intact  PERRL  EOMI  Face symmetric  Tongue midline  Motor exam   Hip Flexor:                Right: 1/5  Left:  1/5  Hip Adductor:             Right:  1/5  Left:  1/5  Hip Abductor:             Right:  1/5  Left:  1/5  Gastroc Soleus:        Right:  1/5  Left:  1/5  Tib/Ant:                      Right:  1/5  Left:  1/5  EHL:                          Right:  1/5  Left: 1/5       Sensation decreased to bilateral lower extremities; distal worse than proximal    Reflexes are 1+ in the patellar and Achilles. There is no clonus.   Musculoskeletal:  Gait: Wheelchair bound  Lumbar examination reveals no tenderness of the spine or paraspinous muscles.  Hip height is symmetrical. Negative SI joint, sciatic notch or greater trochanteric tenderness to palpation bilaterally.  Straight leg raise is negative bilaterally.      Imaging:   MRI of the lumbar spine from 8/6/2019 was reviewed in the office today. Reveals stable L4-5 fusion and severe bilateral facet changes at L5-S1.    Assessment/Plan:   Elaina Valenzuela is a 58 year old male with history of L4-5 fusion and T8-9 laminectomy for resection of intradural  meningioma who presents for evaluation of low back pain and bilateral leg weakness. Pain is located in mid low back and he has numbness in bilateral entire legs down to the feet. Describes the pain as a constant numbness. He does have baseline left leg weakness that has been present since his surgery, but over the last 1-2 years has developed right leg weakness that has progressively worsened to the point where he is wheel chair bound. He has been doing PT at [facility] and was advised to be evaluated as he is not improving    Will plan for T9-10 laminectomy  Discussed guarded prognosis given current severity of weakness  Risks and benefits discussed

## 2019-08-29 NOTE — LETTER
8/29/2019         RE: Elaina Valenzuela  [address]  [place] [address]        Dear Colleague,    Thank you for referring your patient, Elaina Valenzuela, to the [facility]. Please see a copy of my visit note below.    Elaina Valenzuela is a 58 year old male with history of L4-5 fusion and T8-9 laminectomy for resection of intradural meningioma who presents for evaluation of low back pain and bilateral leg weakness. Pain is located in mid low back and he has numbness in bilateral entire legs down to the feet. Describes the pain as a constant numbness. He does have baseline left leg weakness that has been present since his surgery, but over the last 1-2 years has developed right leg weakness that has progressively worsened to the point where he is wheel chair bound. He has been doing PT at [facility] and was advised to be evaluated as he is not improving.  Denies bladder/bowel incontinence; this has resolved from his prior surgery.    Underwent L4-5 TLIF for severe leg weakness in 2011, then developed worsening symptoms and urinary retention, found to have T8-9 meningioma and underwent laminectomy and resection for this.  Was ambulatory afterwards, but now with severe progressive weakness over the last year.  Nearly no LE function at this point, and wheelchair bound.  We obtained new MR Thoracic which shows critical stenosis at T9-10.  MR Cervical with moderate stenosis, but no arm symptoms.    Past Medical History:   Diagnosis Date     Chronic back pain      HTN (hypertension)      Left leg weakness      Mild intermittent asthma      Neurogenic bladder        Past Medical History reviewed with patient during visit.    Past Surgical History:   Procedure Laterality Date     C NONSPECIFIC PROCEDURE      Rt. shoulder cyst removed, at [place]     COLONOSCOPY  5/2016    repeat in 1 year due to very poor prep     COLONOSCOPY N/A 5/19/2016    Procedure: COLONOSCOPY;  Surgeon: Jean Guo  MD [place];  Location: [facility] OR     COLONOSCOPY WITH CO2 INSUFFLATION N/A 5/19/2016    Procedure: COLONOSCOPY WITH CO2 INSUFFLATION;  Surgeon: Jean Guo MD;  Location: [facility]     LAMINECT/DISCECTOMY, LUMBAR  3/19/2010    L4-5 herniated disc with severe left leg neruopathy      SURGICAL HISTORY OF -   3/23/2010    removal of meningial tumor of thoracic spine     Past Surgical History reviewed with patient during visit.    Current Outpatient Medications   Medication     amitriptyline (ELAVIL) 100 MG tablet     atorvastatin (LIPITOR) 20 MG tablet     BENEFIBER DRINK MIX OR POWD     citalopram (CELEXA) 20 MG tablet     gabapentin (NEURONTIN) 300 MG capsule     hydrochlorothiazide (HYDRODIURIL) 25 MG tablet     metoprolol tartrate (LOPRESSOR) 100 MG tablet     naproxen (NAPROSYN) 500 MG tablet     tamsulosin (FLOMAX) 0.4 MG capsule     TYLENOL EXTRA STRENGTH 500 MG PO TABS     No current facility-administered medications for this visit.        Allergies   Allergen Reactions     Penicillins Rash       Social History     Socioeconomic History     Marital status:      Spouse name: Not on file     Number of children: Not on file     Years of education: Not on file     Highest education level: Not on file   Occupational History     Not on file   Social Needs     Financial resource strain: Not on file     Food insecurity:     Worry: Not on file     Inability: Not on file     Transportation needs:     Medical: Not on file     Non-medical: Not on file   Tobacco Use     Smoking status: Never Smoker     Smokeless tobacco: Never Used   Substance and Sexual Activity     Alcohol use: Yes     Comment: rare     Drug use: No     Sexual activity: Never   Lifestyle     Physical activity:     Days per week: Not on file     Minutes per session: Not on file     Stress: Not on file   Relationships     Social connections:     Talks on phone: Not on file     Gets together: Not on file     Attends Nondenominational service: Not on file      Active member of club or organization: Not on file     Attends meetings of clubs or organizations: Not on file     Relationship status: Not on file     Intimate partner violence:     Fear of current or ex partner: Not on file     Emotionally abused: Not on file     Physically abused: Not on file     Forced sexual activity: Not on file   Other Topics Concern     Parent/sibling w/ CABG, MI or angioplasty before 65F 55M? No   Social History Narrative     Not on file       Family History   Problem Relation Age of Onset     Hypertension Mother           ROS: 10 point ROS neg other than the symptoms noted above in the HPI.    Vital Signs: /79   Temp 97  F (36.1  C)      Examination:  Constitutional:  Alert, well nourished, NAD.  HEENT: Normocephalic, atraumatic.   Pulmonary:  Without shortness of breath, normal effort.   Lymph: no lymphadenopathy to low back or LE.   Integumentary: Skin is free of rashes or lesions.   Cardiovascular:  No pitting edema of BLE.      Neurological:  Awake  Alert  Oriented x 3  Speech clear  Cranial nerves II - XII grossly intact  PERRL  EOMI  Face symmetric  Tongue midline  Motor exam   Hip Flexor:                Right: 1/5  Left:  1/5  Hip Adductor:             Right:  1/5  Left:  1/5  Hip Abductor:             Right:  1/5  Left:  1/5  Gastroc Soleus:        Right:  1/5  Left:  1/5  Tib/Ant:                      Right:  1/5  Left:  1/5  EHL:                          Right:  1/5  Left: 1/5       Sensation decreased to bilateral lower extremities; distal worse than proximal    Reflexes are 1+ in the patellar and Achilles. There is no clonus.   Musculoskeletal:  Gait: Wheelchair bound  Lumbar examination reveals no tenderness of the spine or paraspinous muscles.  Hip height is symmetrical. Negative SI joint, sciatic notch or greater trochanteric tenderness to palpation bilaterally.  Straight leg raise is negative bilaterally.      Imaging:   MRI of the lumbar spine from 8/6/2019 was  reviewed in the office today. Reveals stable L4-5 fusion and severe bilateral facet changes at L5-S1.    Assessment/Plan:   Elaina Valenzuela is a 58 year old male with history of L4-5 fusion and T8-9 laminectomy for resection of intradural meningioma who presents for evaluation of low back pain and bilateral leg weakness. Pain is located in mid low back and he has numbness in bilateral entire legs down to the feet. Describes the pain as a constant numbness. He does have baseline left leg weakness that has been present since his surgery, but over the last 1-2 years has developed right leg weakness that has progressively worsened to the point where he is wheel chair bound. He has been doing PT at [facility] and was advised to be evaluated as he is not improving    Will plan for T9-10 laminectomy  Discussed guarded prognosis given current severity of weakness  Risks and benefits discussed      Again, thank you for allowing me to participate in the care of your patient.        Sincerely,        Reed Munguia MD

## 2019-08-29 NOTE — NURSING NOTE
Patient Education    Education included but not limited to:  - Surgical risks: blood clots, urinating difficulties, nerve damage, infection.  - Pre-operative physical with primary care physician within 30 days of surgical date.   - Pre-operative clearance from other pertaining specialties.   - Discontinue NSAIDS x 7 days prior to surgical date.   -Do not begin taking NSAIDs (Advil, Motrin, Ibuprofen, Nuprin, Diclofenac, Meloxicam, Aleve, Celebrex, Aspirin, etc.) until 6 weeks after surgery if you had a fusion. May cause bleeding and interfere with bone healing.    -May try Tylenol for pain.    -Discussed being off work after surgery, short term disability, FMLA, etc.   -Forms to be completed    -Pre-op timeline: NPO, shower, medications    -Hospital stay: Checking in, surgery, recovery room, hospital room.    - Post operative pain management: narcotics, muscle relaxants, ice, etc.   -No driving while taking narcotics     -Post operative incision care:   Keep your incision clean and dry.   Okay to shower. No submerging in water until incision healed.   Watch for signs of infection and notify clinic if drainage or fever develops.   - Post operative activity limitations recommended until follow up appointment: no lifting > 10 pounds; limited bending, twisting, overhead reaching.  -If a brace is required per Dr. Munguia, [facility] will fit you for the brace in the hospital.  - Follow up appointments: 6 week post op, 3 months post op. Please call to schedule follow up appointment at [phone].   - Education book was also given to the patient for further review.      Patient verbalized understanding of above instructions. All questions were answered to the best of my ability and the patient's satisfaction. Patient advised to call with any additional questions or concerns.

## 2019-09-04 ENCOUNTER — ANCILLARY PROCEDURE (OUTPATIENT)
Dept: CT IMAGING | Facility: CLINIC | Age: 59
End: 2019-09-04
Attending: NEUROLOGICAL SURGERY
Payer: COMMERCIAL

## 2019-09-04 ENCOUNTER — TELEPHONE (OUTPATIENT)
Dept: NEUROSURGERY | Facility: CLINIC | Age: 59
End: 2019-09-04

## 2019-09-04 DIAGNOSIS — M47.14 THORACIC MYELOPATHY: ICD-10-CM

## 2019-09-04 PROCEDURE — 72128 CT CHEST SPINE W/O DYE: CPT | Performed by: RADIOLOGY

## 2019-09-04 NOTE — TELEPHONE ENCOUNTER
Type of surgery: T9-10 laminectomies  Location of surgery: [facility]  Date and time of surgery: 9/18/19 @1:45pm  Surgeon: MD Nilda  Pre-Op Appt Date: 9/6/19 caesar/ Sandra  Post-Op Appt Date: 10/31/19 caesar/ Theresa   Packet sent out: Yes  Pre-cert/Authorization completed:  Yes  Date: 8/4/19 JOHN

## 2019-09-06 ENCOUNTER — OFFICE VISIT (OUTPATIENT)
Dept: FAMILY MEDICINE | Facility: CLINIC | Age: 59
End: 2019-09-06
Payer: COMMERCIAL

## 2019-09-06 VITALS
HEART RATE: 73 BPM | DIASTOLIC BLOOD PRESSURE: 72 MMHG | TEMPERATURE: 98 F | RESPIRATION RATE: 16 BRPM | SYSTOLIC BLOOD PRESSURE: 110 MMHG | OXYGEN SATURATION: 95 %

## 2019-09-06 DIAGNOSIS — G82.20 PARAPLEGIA (H): ICD-10-CM

## 2019-09-06 DIAGNOSIS — I10 BENIGN ESSENTIAL HYPERTENSION: ICD-10-CM

## 2019-09-06 DIAGNOSIS — J45.30 MILD PERSISTENT ASTHMA WITHOUT COMPLICATION: ICD-10-CM

## 2019-09-06 DIAGNOSIS — M54.50 CHRONIC MIDLINE LOW BACK PAIN WITHOUT SCIATICA: ICD-10-CM

## 2019-09-06 DIAGNOSIS — Z01.818 PREOP GENERAL PHYSICAL EXAM: Primary | ICD-10-CM

## 2019-09-06 DIAGNOSIS — D32.1 SPINAL MENINGIOMA (H): ICD-10-CM

## 2019-09-06 DIAGNOSIS — E66.09 OBESITY DUE TO EXCESS CALORIES, UNSPECIFIED OBESITY SEVERITY: ICD-10-CM

## 2019-09-06 DIAGNOSIS — G89.29 CHRONIC MIDLINE LOW BACK PAIN WITHOUT SCIATICA: ICD-10-CM

## 2019-09-06 PROCEDURE — 93000 ELECTROCARDIOGRAM COMPLETE: CPT | Performed by: FAMILY MEDICINE

## 2019-09-06 PROCEDURE — 99214 OFFICE O/P EST MOD 30 MIN: CPT | Performed by: FAMILY MEDICINE

## 2019-09-06 RX ORDER — ALBUTEROL SULFATE 90 UG/1
2 AEROSOL, METERED RESPIRATORY (INHALATION) EVERY 6 HOURS PRN
Qty: 1 INHALER | Refills: 10 | Status: SHIPPED | OUTPATIENT
Start: 2019-09-06 | End: 2021-09-02

## 2019-09-06 NOTE — PROGRESS NOTES
[facility]  [address] [address]  [phone]  Dept: [phone]    PRE-OP EVALUATION:  Today's date: 2019    Elaina Valenzuela (: 1960) presents for pre-operative evaluation assessment as requested by Dr. Munguia.  He requires evaluation and anesthesia risk assessment prior to undergoing surgery/procedure for treatment of low back pain .    Proposed Surgery/ Procedure: T9-10 LAMINECTOMY (C-ARM, MIDAS JUAN, LEICA MICROSCOPE, COMPREHENSIVE SPINAL MONITORING   Date of Surgery/ Procedure: 19  Time of Surgery/ Procedure: 2:05pm  Hospital/Surgical Facility: [facility]  Fax number for surgical facility:   Primary Physician: Araseli Patton  Type of Anesthesia Anticipated: General    Patient has a Health Care Directive or Living Will:  NO    1. NO - Do you have a history of heart attack, stroke, stent, bypass or surgery on an artery in the head, neck, heart or legs?  2. NO - Do you ever have any pain or discomfort in your chest?  3. NO - Do you have a history of  Heart Failure?  4. NO - Are you troubled by shortness of breath when: walking on the level, up a slight hill or at night?  5. NO - Do you currently have a cold, bronchitis or other respiratory infection?  6. NO - Do you have a cough, shortness of breath or wheezing?  7. NO - Do you sometimes get pains in the calves of your legs when you walk?  8. NO - Do you or anyone in your family have previous history of blood clots?  9. NO - Do you or does anyone in your family have a serious bleeding problem such as prolonged bleeding following surgeries or cuts?  10. NO - Have you ever had problems with anemia or been told to take iron pills?  11. NO - Have you had any abnormal blood loss such as black, tarry or bloody stools, or abnormal vaginal bleeding?  12. NO - Have you ever had a blood transfusion?  13. NO - Have you or any of your relatives ever had problems with anesthesia?  14. NO - Do you have  sleep apnea, excessive snoring or daytime drowsiness?  15. NO - Do you have any prosthetic heart valves?  16. NO - Do you have prosthetic joints?  17. NO - Is there any chance that you may be pregnant?      HPI:     HPI related to upcoming procedure: Patient presents for pre op evaluation in anticipation for laminectomy to treat bilateral lower extremity weakness    Asthma - patient has needed rescue inhaler 4-5x per day.  Previously was taking inhaled steroid however insurance stopped covering it so he stopped taking it and never received an alternative.  Triggers unknown.  Requests albuterol inhaler refill today.    Neurogenic bladder  Hyperlipidemia  HTN - well controlled on hydrochlorothiazide, asymptomatic    MEDICAL HISTORY:     Patient Active Problem List    Diagnosis Date Noted     Benign essential hypertension 12/22/2016     Priority: Medium     Obesity due to excess calories, unspecified obesity severity 12/22/2016     Priority: Medium     Mild intermittent asthma without complication 03/23/2016     Priority: Medium     Erectile dysfunction, unspecified erectile dysfunction type 03/23/2016     Priority: Medium     Moderate major depression (H) 12/19/2012     Priority: Medium     Urinary retention 01/12/2011     Priority: Medium     CARDIOVASCULAR SCREENING; LDL GOAL LESS THAN 130 10/31/2010     Priority: Medium     Chronic low back pain 08/06/2010     Priority: Medium     [facility]  Krzysztof Agarwal  Phone [phone]  Fax [phone]       Neurogenic bladder 03/29/2010     Priority: Medium     Paraplegia (H) 03/24/2010     Priority: Medium     S/P lumbar fusion 03/22/2010     Priority: Medium     Muscle weakness of lower extremity 03/22/2010     Priority: Medium     Spinal meningioma (H) 03/22/2010     Priority: Medium     Overview:   with cord displacement        Past Medical History:   Diagnosis Date     Chronic back pain      HTN (hypertension)      Left leg weakness      Mild  intermittent asthma      Neurogenic bladder      Past Surgical History:   Procedure Laterality Date     C NONSPECIFIC PROCEDURE      Rt. shoulder cyst removed, at [place]     COLONOSCOPY  5/2016    repeat in 1 year due to very poor prep     COLONOSCOPY N/A 5/19/2016    Procedure: COLONOSCOPY;  Surgeon: Jean Guo MD;  Location: [facility] OR     COLONOSCOPY WITH CO2 INSUFFLATION N/A 5/19/2016    Procedure: COLONOSCOPY WITH CO2 INSUFFLATION;  Surgeon: Jean Guo MD;  Location: [facility] OR     LAMINECT/DISCECTOMY, LUMBAR  3/19/2010    L4-5 herniated disc with severe left leg neruopathy      SURGICAL HISTORY OF -   3/23/2010    removal of meningial tumor of thoracic spine     Current Outpatient Medications   Medication Sig Dispense Refill     albuterol (PROAIR HFA/PROVENTIL HFA/VENTOLIN HFA) 108 (90 Base) MCG/ACT inhaler Inhale 2 puffs into the lungs every 6 hours as needed for shortness of breath / dyspnea or wheezing 1 Inhaler 10     amitriptyline (ELAVIL) 100 MG tablet TAKE 1 TABLET BY MOUTH EVERY DAY AT BEDTIME 90 tablet 3     atorvastatin (LIPITOR) 20 MG tablet Take 1 tablet (20 mg) by mouth daily 90 tablet 1     BENEFIBER DRINK MIX OR POWD 2 teaspoons w/water       citalopram (CELEXA) 20 MG tablet Take 1 tablet (20 mg) by mouth daily 90 tablet 1     fluticasone-salmeterol (ADVAIR) 100-50 MCG/DOSE inhaler Inhale 1 puff into the lungs 2 times daily 1 Inhaler 3     gabapentin (NEURONTIN) 300 MG capsule Take 2 capsules (600 mg) by mouth 3 times daily 360 capsule 1     hydrochlorothiazide (HYDRODIURIL) 25 MG tablet TAKE 1 TABLET BY MOUTH EVERY MORNING. NO REFILLS UNTIL FOLLOW UP 90 tablet 1     metoprolol tartrate (LOPRESSOR) 100 MG tablet TAKE 1 TABLET(100 MG) BY MOUTH TWICE DAILY 180 tablet 2     naproxen (NAPROSYN) 500 MG tablet Take 1 tablet (500 mg) by mouth 2 times daily as needed for moderate pain 180 tablet 1     tamsulosin (FLOMAX) 0.4 MG capsule Take 1 capsule (0.4 mg) by mouth daily 30 capsule 0      TYLENOL EXTRA STRENGTH 500 MG PO TABS  2 tablets as needed       OTC products: None, except as noted above    Allergies   Allergen Reactions     Penicillins Rash      Latex Allergy: NO    Social History     Tobacco Use     Smoking status: Never Smoker     Smokeless tobacco: Never Used   Substance Use Topics     Alcohol use: Yes     Comment: rare     History   Drug Use No       REVIEW OF SYSTEMS:   Constitutional, neuro, ENT, endocrine, pulmonary, cardiac, gastrointestinal, genitourinary, musculoskeletal, integument and psychiatric systems are negative, except as otherwise noted.    EXAM:   /72   Pulse 73   Temp 98  F (36.7  C) (Oral)   Resp 16   SpO2 95%     GENERAL APPEARANCE: healthy, alert and no distress     EYES: EOMI,  PERRL     HENT: ear canals and TM's normal and nose and mouth without ulcers or lesions     NECK: no adenopathy, no asymmetry, masses, or scars and thyroid normal to palpation     RESP: wheezing/poor air movement throughout     CV: regular rates and rhythm, normal S1 S2, no S3 or S4 and no murmur, click or rub     ABDOMEN:  soft, nontender, no HSM or masses and bowel sounds normal     MS: extremities normal- no gross deformities noted, no evidence of inflammation in joints, FROM in all extremities.     SKIN: no suspicious lesions or rashes     NEURO: Normal strength and tone, sensory exam grossly normal, mentation intact and speech normal     PSYCH: mentation appears normal. and affect normal/bright     LYMPHATICS: No cervical adenopathy    DIAGNOSTICS:   EKG: normal axis, normal intervals, no acute ST/T changes c/w ischemia, no LVH by voltage criteria, unchanged from previous tracings    Recent Labs   Lab Test 05/17/19  1746 10/10/17  1116  01/19/16  1035   HGB  --   --   --  16.0   PLT  --   --   --  298    136   < >  --    POTASSIUM 3.8 3.6   < >  --    CR 0.90 1.02   < >  --    A1C 5.4  --   --   --     < > = values in this interval not displayed.     IMPRESSION:   Reason  for surgery/procedure: laminectomy  Diagnosis/reason for consult: Pre op evaluation    The proposed surgical procedure is considered INTERMEDIATE risk.    REVISED CARDIAC RISK INDEX  The patient has the following serious cardiovascular risks for perioperative complications such as (MI, PE, VFib and 3  AV Block):  No serious cardiac risks  INTERPRETATION: 0 risks: Class I (very low risk - 0.4% complication rate)    The patient has the following additional risks for perioperative complications:  No identified additional risks      ICD-10-CM    1. Preop general physical exam Z01.818 EKG 12-lead complete w/read - Clinics   2. Chronic midline low back pain without sciatica M54.5     G89.29    3. Mild persistent asthma without complication J45.30 albuterol (PROAIR HFA/PROVENTIL HFA/VENTOLIN HFA) 108 (90 Base) MCG/ACT inhaler     fluticasone-salmeterol (ADVAIR) 100-50 MCG/DOSE inhaler   4. Benign essential hypertension I10    5. Obesity due to excess calories, unspecified obesity severity E66.09    6. Spinal meningioma (H) D32.1    7. Paraplegia (H) G82.20      Asthma - poor control at this time, will give albuterol refill, recommend use every 4-6 hours.  Start inhaled steroid BID.  Patient Instructions       Before Your Surgery      Call your surgeon if there is any change in your health. This includes signs of a cold or flu (such as a sore throat, runny nose, cough, rash or fever).    Do not smoke, drink alcohol or take over the counter medicine (unless your surgeon or primary care doctor tells you to) for the 24 hours before and after surgery.    If you take prescribed drugs: Follow your doctor s orders about which medicines to take and which to stop until after surgery.    Eating and drinking prior to surgery: follow the instructions from your surgeon    Take a shower or bath the night before surgery. Use the soap your surgeon gave you to gently clean your skin. If you do not have soap from your surgeon, use your  regular soap. Do not shave or scrub the surgery site.  Wear clean pajamas and have clean sheets on your bed.   Patient Education     My Asthma Symptom Diary  Keep track of symptoms with the chart below. (Make some copies first.) Show your records to your healthcare provider at your visits. As your asthma control gets better you should have fewer episodes of symptoms to record.  Asthma symptom diary  Date Symptoms Possible triggers Action taken Results Did symptoms interfere with your sleep? Yes or No?   3/3 Example:  Wheezing, peak flow 75% Cold air 2 puffs albuterol, went inside Symptoms gone in 20 min. No                                                                                       Date Last Reviewed: 9/1/2018 2000-2018 [employer]. [address] [place] [address], [place] [address]. All rights reserved. This information is not intended as a substitute for professional medical care. Always follow your healthcare professional's instructions.           Patient Education     Controlling Your Asthma  You can do a lot to manage your asthma and improve your quality of life. You will need to work with your healthcare provider to develop a plan. But it s up to you to put this plan into action.  Why you need to take control  You need to control the inflammation in your lungs. Take all medicine as directed, especially controller medicines, even if you feel that your asthma is under good control. You also need to relieve symptoms when you have them. These are long-term tasks. But the more you stay in control, the better you ll feel. If you don t stay in control:  Asthma symptoms may cause you to miss school, work, or activities that you enjoy.  Asthma flare-ups can be dangerous, even deadly.  Uncontrolled asthma makes it more likely that you will need emergency department and in-hospital care.  Uncontrolled asthma may cause permanent damage to your lungs.    Peak flow monitoring helps measure how open your  airways are.   Taking medicine helps you control your asthma and relieve symptoms when they occur.     Using an Asthma Action Plan will help you keep track of and respond to asthma symptoms.   Avoiding triggers--the things that inflame your airways--will help prevent symptoms and flare-ups.   Your action plan  Your healthcare provider will help you prepare, and when needed, update your personal Asthma Action Plan. Your plan tells you what to do based on your current symptoms. If you don't have an Asthma Action Plan, or if yours isn't up-to-date, make sure you talk with your healthcare provider.  Date Last Reviewed: 1/1/2017 2000-2018 [employer]. [address] [place] [address], [place] [address]. All rights reserved. This information is not intended as a substitute for professional medical care. Always follow your healthcare professional's instructions.           Patient Education     Asthma Medicine     Get used to using your inhaled corticosteroid medicine before you brush your teeth. That way you will always rinse your mouth afterward.   Medicines play a key role in controlling asthma. Some help control asthma symptoms and prevent flare-ups. Others are used to treat symptoms when they occur. Always take your medicine as prescribed. Know the names of your medicines and how and when to use them. If you have any questions about your medicines, talk with your healthcare provider or pharmacist.  Quick-relief medicine  Quick-relief (also called  rescue ) medicines work by relaxing the muscles around the airways. This helps ease symptoms such as coughing, wheezing, and shortness of breath. Keep your quick-relief inhaler with you at all times. Quick-relief medicines:  Are inhaled when needed and only when needed. Use your quick-relief medicine when you first notice your asthma is getting worse.  Start to open the airways within a few minutes after you use them.  Can help stop a flare-up once it has begun.  May  be used before exercise as directed by your healthcare provider.  Long-term control medicine  Long-term control (also called  maintenance  or controller) medicines help reduce swelling and inflammation of the airways. Some keep the muscles around your airways relaxed. This makes the airways less sensitive to triggers and less likely to flare up. Long-term control medicines:  Are taken on a schedule. For most people, this is every day. They are taken even when you feel fine.  Help keep asthma under control so you re less likely to have symptoms.  Will not stop a flare-up once it has begun.     Inhaled corticosteroids  Inhaled corticosteroids are safe for long-term use. They are not the  steroids  that you hear about athletes abusing. They usually don't cause serious side effects. That s because they re inhaled directly into the lungs. The chance of minor side effects can be lowered even more if you:  Use a spacer with your inhaler. Ask your healthcare provider or pharmacist about using a spacer if you don't currently use one.  Rinse your mouth, gargle, and spit out the water after using your inhaled corticosteroid medicine.  Follow all instructions for cleaning inhalers and spacers.  Work with your healthcare provider to find the lowest dose that controls your asthma.      Tips for taking medicine  Remembering to take medicine each day can be hard for anyone. It can be even harder to remember when you don t have symptoms. Try these tips:  Develop a routine. For example, take long-term controllers as part of getting ready for bed, before you brush your teeth in the morning, or both.  Make sure you understand what long-term controllers do and don t do.  Refill your prescriptions on time, or even ahead of time, so you don t run out.  Carry your quick-relief medicine with you. If you can, keep a spare quick-relief inhaler at work, at school, or in your gym bag.  When you travel, make sure you have enough medicine to last  for your entire trip.  When traveling by air, keep your medicines with you, not packed in your luggage.  Make sure you know how to tell if your inhaler is empty. Ask your provider or pharmacist, or check the instructions that come with your inhaler.  Working with your healthcare provider  By working with your healthcare provider, you can get the most benefit from your medicine. Talk with your healthcare provider about:  Getting the right dose. Over time, your healthcare provider may raise or lower the dose of your controller medicine. The goal is to find the amount of medicine to keep asthma in control, without taking more than is needed.  Finding the right medicines for you. Each person is unique. It may take a few tries to find the right medicine or combination of medicines for you. If one medicine doesn t work well for you, another may work better.  Minimizing side effects. If you have side effects, don t just stop taking your medicine. Instead, call your healthcare provider. A new medicine or change in the amount of medicine may solve the problem.  Date Last Reviewed: 10/1/2016    6413-8997 [employer]. [address] [place] [address]. All rights reserved. This information is not intended as a substitute for professional medical care. Always follow your healthcare professional's instructions.             Rany     Pleasure seeing you in clinic today.  Here's the plan    1. Asthma - start using your advair inhaler 2x per day.  Use your albuterol inhaler or nebulizer as needed every 4-6 hours.  The goal is for you to never really need your albuterol rescue inhaler.    2. Pre-op evaluation - I will fax your information to your surgeon in anticipation for surgery.    Rosas Escalante MD           RECOMMENDATIONS:     --Consult hospital rounder / IM to assist post-op medical management    --Patient is to take all scheduled medications on the day of surgery EXCEPT for modifications listed  below.    APPROVAL GIVEN to proceed with proposed procedure, without further diagnostic evaluation       Signed Electronically by: Rosas Escalante MD    Copy of this evaluation report is provided to requesting physician.    [place] Preop Guidelines    Revised Cardiac Risk Index

## 2019-09-06 NOTE — PATIENT INSTRUCTIONS
Before Your Surgery      Call your surgeon if there is any change in your health. This includes signs of a cold or flu (such as a sore throat, runny nose, cough, rash or fever).    Do not smoke, drink alcohol or take over the counter medicine (unless your surgeon or primary care doctor tells you to) for the 24 hours before and after surgery.    If you take prescribed drugs: Follow your doctor s orders about which medicines to take and which to stop until after surgery.    Eating and drinking prior to surgery: follow the instructions from your surgeon    Take a shower or bath the night before surgery. Use the soap your surgeon gave you to gently clean your skin. If you do not have soap from your surgeon, use your regular soap. Do not shave or scrub the surgery site.  Wear clean pajamas and have clean sheets on your bed.   Patient Education     My Asthma Symptom Diary  Keep track of symptoms with the chart below. (Make some copies first.) Show your records to your healthcare provider at your visits. As your asthma control gets better you should have fewer episodes of symptoms to record.  Asthma symptom diary  Date Symptoms Possible triggers Action taken Results Did symptoms interfere with your sleep? Yes or No?   3/3 Example:  Wheezing, peak flow 75% Cold air 2 puffs albuterol, went inside Symptoms gone in 20 min. No                                                                                       Date Last Reviewed: 9/1/2018 2000-2018 [employer]. [address] [place] [address], [place] [address]. All rights reserved. This information is not intended as a substitute for professional medical care. Always follow your healthcare professional's instructions.           Patient Education     Controlling Your Asthma  You can do a lot to manage your asthma and improve your quality of life. You will need to work with your healthcare provider to develop a plan. But it s up to you to put this plan into  action.  Why you need to take control  You need to control the inflammation in your lungs. Take all medicine as directed, especially controller medicines, even if you feel that your asthma is under good control. You also need to relieve symptoms when you have them. These are long-term tasks. But the more you stay in control, the better you ll feel. If you don t stay in control:  Asthma symptoms may cause you to miss school, work, or activities that you enjoy.  Asthma flare-ups can be dangerous, even deadly.  Uncontrolled asthma makes it more likely that you will need emergency department and in-hospital care.  Uncontrolled asthma may cause permanent damage to your lungs.    Peak flow monitoring helps measure how open your airways are.   Taking medicine helps you control your asthma and relieve symptoms when they occur.     Using an Asthma Action Plan will help you keep track of and respond to asthma symptoms.   Avoiding triggers--the things that inflame your airways--will help prevent symptoms and flare-ups.   Your action plan  Your healthcare provider will help you prepare, and when needed, update your personal Asthma Action Plan. Your plan tells you what to do based on your current symptoms. If you don't have an Asthma Action Plan, or if yours isn't up-to-date, make sure you talk with your healthcare provider.  Date Last Reviewed: 1/1/2017 2000-2018 [employer]. [address] [place] [address], [place] [address]. All rights reserved. This information is not intended as a substitute for professional medical care. Always follow your healthcare professional's instructions.           Patient Education     Asthma Medicine     Get used to using your inhaled corticosteroid medicine before you brush your teeth. That way you will always rinse your mouth afterward.   Medicines play a key role in controlling asthma. Some help control asthma symptoms and prevent flare-ups. Others are used to treat symptoms when they  occur. Always take your medicine as prescribed. Know the names of your medicines and how and when to use them. If you have any questions about your medicines, talk with your healthcare provider or pharmacist.  Quick-relief medicine  Quick-relief (also called  rescue ) medicines work by relaxing the muscles around the airways. This helps ease symptoms such as coughing, wheezing, and shortness of breath. Keep your quick-relief inhaler with you at all times. Quick-relief medicines:  Are inhaled when needed and only when needed. Use your quick-relief medicine when you first notice your asthma is getting worse.  Start to open the airways within a few minutes after you use them.  Can help stop a flare-up once it has begun.  May be used before exercise as directed by your healthcare provider.  Long-term control medicine  Long-term control (also called  maintenance  or controller) medicines help reduce swelling and inflammation of the airways. Some keep the muscles around your airways relaxed. This makes the airways less sensitive to triggers and less likely to flare up. Long-term control medicines:  Are taken on a schedule. For most people, this is every day. They are taken even when you feel fine.  Help keep asthma under control so you re less likely to have symptoms.  Will not stop a flare-up once it has begun.     Inhaled corticosteroids  Inhaled corticosteroids are safe for long-term use. They are not the  steroids  that you hear about athletes abusing. They usually don't cause serious side effects. That s because they re inhaled directly into the lungs. The chance of minor side effects can be lowered even more if you:  Use a spacer with your inhaler. Ask your healthcare provider or pharmacist about using a spacer if you don't currently use one.  Rinse your mouth, gargle, and spit out the water after using your inhaled corticosteroid medicine.  Follow all instructions for cleaning inhalers and spacers.  Work with your  healthcare provider to find the lowest dose that controls your asthma.      Tips for taking medicine  Remembering to take medicine each day can be hard for anyone. It can be even harder to remember when you don t have symptoms. Try these tips:  Develop a routine. For example, take long-term controllers as part of getting ready for bed, before you brush your teeth in the morning, or both.  Make sure you understand what long-term controllers do and don t do.  Refill your prescriptions on time, or even ahead of time, so you don t run out.  Carry your quick-relief medicine with you. If you can, keep a spare quick-relief inhaler at work, at school, or in your gym bag.  When you travel, make sure you have enough medicine to last for your entire trip.  When traveling by air, keep your medicines with you, not packed in your luggage.  Make sure you know how to tell if your inhaler is empty. Ask your provider or pharmacist, or check the instructions that come with your inhaler.  Working with your healthcare provider  By working with your healthcare provider, you can get the most benefit from your medicine. Talk with your healthcare provider about:  Getting the right dose. Over time, your healthcare provider may raise or lower the dose of your controller medicine. The goal is to find the amount of medicine to keep asthma in control, without taking more than is needed.  Finding the right medicines for you. Each person is unique. It may take a few tries to find the right medicine or combination of medicines for you. If one medicine doesn t work well for you, another may work better.  Minimizing side effects. If you have side effects, don t just stop taking your medicine. Instead, call your healthcare provider. A new medicine or change in the amount of medicine may solve the problem.  Date Last Reviewed: 10/1/2016    2306-4364 [employer]. [address] [facility], [place] [address]. All rights reserved. This  information is not intended as a substitute for professional medical care. Always follow your healthcare professional's instructions.             Rany     Pleasure seeing you in clinic today.  Here's the plan    1. Asthma - start using your advair inhaler 2x per day.  Use your albuterol inhaler or nebulizer as needed every 4-6 hours.  The goal is for you to never really need your albuterol rescue inhaler.    2. Pre-op evaluation - I will fax your information to your surgeon in anticipation for surgery.    Rosas Escalante MD

## 2019-09-09 ENCOUNTER — TELEPHONE (OUTPATIENT)
Dept: FAMILY MEDICINE | Facility: CLINIC | Age: 59
End: 2019-09-09

## 2019-09-09 NOTE — TELEPHONE ENCOUNTER
ACT and PHQ9 mailed to patient. Postponing for 3 weeks  Jackie Herrera CMA on 9/9/2019 at 12:38 PM

## 2019-09-09 NOTE — TELEPHONE ENCOUNTER
Patient was seen on 09/06/19. Patient was due for PHQ-9 and ACT. Missed at office visit. Please mail ACT to patient and complete PHQ-9. Kendal Horn MA

## 2019-09-09 NOTE — LETTER
September 9, 2019          Elaina Valenzuela,  [address]   [place] [address]        Dear Elaina Valenzuela      Monitoring and managing your preventative and chronic health conditions are very important to us. Our records indicate that you have not scheduled for Asthma Check and Depression Check  which was recommended by Dr. Weinstein. Please fill out the questionnaires and return in evelope provided.      If you have received your health care elsewhere, please call the clinic so the information can be documented in your chart.    Please call [phone] or message us through your [employer] account to schedule an appointment or provide information for your chart.     Feel free to contact us if you have any questions or concerns!    I look forward to seeing you and working with you on your health care needs.     Sincerely,       Your [facility]

## 2019-09-18 ENCOUNTER — HOSPITAL ENCOUNTER (INPATIENT)
Facility: CLINIC | Age: 59
LOS: 7 days | Discharge: NURSING, REHAB OR LONG-TERM CARE | DRG: 518 | End: 2019-09-25
Attending: NEUROLOGICAL SURGERY | Admitting: NEUROLOGICAL SURGERY
Payer: COMMERCIAL

## 2019-09-18 ENCOUNTER — APPOINTMENT (OUTPATIENT)
Dept: GENERAL RADIOLOGY | Facility: CLINIC | Age: 59
DRG: 518 | End: 2019-09-18
Attending: NEUROLOGICAL SURGERY
Payer: COMMERCIAL

## 2019-09-18 ENCOUNTER — ANESTHESIA (OUTPATIENT)
Dept: SURGERY | Facility: CLINIC | Age: 59
DRG: 518 | End: 2019-09-18
Payer: COMMERCIAL

## 2019-09-18 ENCOUNTER — ANESTHESIA EVENT (OUTPATIENT)
Dept: SURGERY | Facility: CLINIC | Age: 59
DRG: 518 | End: 2019-09-18
Payer: COMMERCIAL

## 2019-09-18 DIAGNOSIS — Z98.890 S/P LAMINECTOMY: Primary | ICD-10-CM

## 2019-09-18 LAB — GLUCOSE BLDC GLUCOMTR-MCNC: 155 MG/DL (ref 70–99)

## 2019-09-18 PROCEDURE — 37000009 ZZH ANESTHESIA TECHNICAL FEE, EACH ADDTL 15 MIN: Performed by: NEUROLOGICAL SURGERY

## 2019-09-18 PROCEDURE — 36000065 ZZH SURGERY LEVEL 4 W FLUORO 1ST 30 MIN: Performed by: NEUROLOGICAL SURGERY

## 2019-09-18 PROCEDURE — 25000128 H RX IP 250 OP 636: Performed by: NURSE ANESTHETIST, CERTIFIED REGISTERED

## 2019-09-18 PROCEDURE — 40000277 XR SURGERY CARM FLUORO LESS THAN 5 MIN W STILLS

## 2019-09-18 PROCEDURE — 25800030 ZZH RX IP 258 OP 636: Performed by: PHYSICIAN ASSISTANT

## 2019-09-18 PROCEDURE — 12000047 ZZH R&B IMCU

## 2019-09-18 PROCEDURE — C1763 CONN TISS, NON-HUMAN: HCPCS | Performed by: NEUROLOGICAL SURGERY

## 2019-09-18 PROCEDURE — 37000008 ZZH ANESTHESIA TECHNICAL FEE, 1ST 30 MIN: Performed by: NEUROLOGICAL SURGERY

## 2019-09-18 PROCEDURE — 27211022 ZZHC OR IOM SUPPLIES OPNP: Performed by: NEUROLOGICAL SURGERY

## 2019-09-18 PROCEDURE — 4A11X4G MONITORING OF PERIPHERAL NERVOUS ELECTRICAL ACTIVITY, INTRAOPERATIVE, EXTERNAL APPROACH: ICD-10-PCS | Performed by: NEUROLOGICAL SURGERY

## 2019-09-18 PROCEDURE — 25000125 ZZHC RX 250: Performed by: NURSE ANESTHETIST, CERTIFIED REGISTERED

## 2019-09-18 PROCEDURE — 99207 ZZC CONSULT E&M CHANGED TO INITIAL LEVEL: CPT | Performed by: PHYSICIAN ASSISTANT

## 2019-09-18 PROCEDURE — 25000132 ZZH RX MED GY IP 250 OP 250 PS 637: Performed by: NURSE ANESTHETIST, CERTIFIED REGISTERED

## 2019-09-18 PROCEDURE — 99222 1ST HOSP IP/OBS MODERATE 55: CPT | Performed by: PHYSICIAN ASSISTANT

## 2019-09-18 PROCEDURE — 25000128 H RX IP 250 OP 636: Performed by: ANESTHESIOLOGY

## 2019-09-18 PROCEDURE — 00000146 ZZHCL STATISTIC GLUCOSE BY METER IP

## 2019-09-18 PROCEDURE — 25000566 ZZH SEVOFLURANE, EA 15 MIN: Performed by: NEUROLOGICAL SURGERY

## 2019-09-18 PROCEDURE — 40000170 ZZH STATISTIC PRE-PROCEDURE ASSESSMENT II: Performed by: NEUROLOGICAL SURGERY

## 2019-09-18 PROCEDURE — 25000125 ZZHC RX 250: Performed by: PHYSICIAN ASSISTANT

## 2019-09-18 PROCEDURE — 00NX0ZZ RELEASE THORACIC SPINAL CORD, OPEN APPROACH: ICD-10-PCS | Performed by: NEUROLOGICAL SURGERY

## 2019-09-18 PROCEDURE — 88311 DECALCIFY TISSUE: CPT | Mod: 26 | Performed by: NEUROLOGICAL SURGERY

## 2019-09-18 PROCEDURE — 63046 LAM FACETEC & FORAMOT THRC: CPT | Mod: AS | Performed by: PHYSICIAN ASSISTANT

## 2019-09-18 PROCEDURE — 25000132 ZZH RX MED GY IP 250 OP 250 PS 637: Performed by: PHYSICIAN ASSISTANT

## 2019-09-18 PROCEDURE — 88311 DECALCIFY TISSUE: CPT | Performed by: NEUROLOGICAL SURGERY

## 2019-09-18 PROCEDURE — 71000012 ZZH RECOVERY PHASE 1 LEVEL 1 FIRST HR: Performed by: NEUROLOGICAL SURGERY

## 2019-09-18 PROCEDURE — 63046 LAM FACETEC & FORAMOT THRC: CPT | Performed by: NEUROLOGICAL SURGERY

## 2019-09-18 PROCEDURE — 95940 IONM IN OPERATNG ROOM 15 MIN: CPT | Performed by: NEUROLOGICAL SURGERY

## 2019-09-18 PROCEDURE — 88305 TISSUE EXAM BY PATHOLOGIST: CPT | Performed by: NEUROLOGICAL SURGERY

## 2019-09-18 PROCEDURE — 88305 TISSUE EXAM BY PATHOLOGIST: CPT | Mod: 26 | Performed by: NEUROLOGICAL SURGERY

## 2019-09-18 PROCEDURE — 25000128 H RX IP 250 OP 636: Performed by: PHYSICIAN ASSISTANT

## 2019-09-18 PROCEDURE — 25000128 H RX IP 250 OP 636: Performed by: NEUROLOGICAL SURGERY

## 2019-09-18 PROCEDURE — 27210794 ZZH OR GENERAL SUPPLY STERILE: Performed by: NEUROLOGICAL SURGERY

## 2019-09-18 PROCEDURE — 25000301 ZZH OR RX SURGIFLO W/THROMBIN KIT 2ML 1991 OPNP: Performed by: NEUROLOGICAL SURGERY

## 2019-09-18 PROCEDURE — 36000063 ZZH SURGERY LEVEL 4 EA 15 ADDTL MIN: Performed by: NEUROLOGICAL SURGERY

## 2019-09-18 PROCEDURE — 25000125 ZZHC RX 250: Performed by: NEUROLOGICAL SURGERY

## 2019-09-18 PROCEDURE — 25800030 ZZH RX IP 258 OP 636: Performed by: NURSE ANESTHETIST, CERTIFIED REGISTERED

## 2019-09-18 DEVICE — SEALANT DURA SEAL 5ML 20-2050: Type: IMPLANTABLE DEVICE | Site: SPINE THORACIC | Status: FUNCTIONAL

## 2019-09-18 DEVICE — GRAFT DURAGEN 3X3" ID330: Type: IMPLANTABLE DEVICE | Site: SPINE THORACIC | Status: FUNCTIONAL

## 2019-09-18 RX ORDER — ACETAMINOPHEN 325 MG/1
975 TABLET ORAL ONCE
Status: COMPLETED | OUTPATIENT
Start: 2019-09-18 | End: 2019-09-18

## 2019-09-18 RX ORDER — METHOCARBAMOL 750 MG/1
750 TABLET, FILM COATED ORAL EVERY 6 HOURS PRN
Status: DISCONTINUED | OUTPATIENT
Start: 2019-09-18 | End: 2019-09-25 | Stop reason: HOSPADM

## 2019-09-18 RX ORDER — LIDOCAINE HYDROCHLORIDE 20 MG/ML
INJECTION, SOLUTION INFILTRATION; PERINEURAL PRN
Status: DISCONTINUED | OUTPATIENT
Start: 2019-09-18 | End: 2019-09-18

## 2019-09-18 RX ORDER — HYDROMORPHONE HYDROCHLORIDE 1 MG/ML
.3-.5 INJECTION, SOLUTION INTRAMUSCULAR; INTRAVENOUS; SUBCUTANEOUS
Status: DISCONTINUED | OUTPATIENT
Start: 2019-09-18 | End: 2019-09-25 | Stop reason: HOSPADM

## 2019-09-18 RX ORDER — SODIUM CHLORIDE, SODIUM LACTATE, POTASSIUM CHLORIDE, CALCIUM CHLORIDE 600; 310; 30; 20 MG/100ML; MG/100ML; MG/100ML; MG/100ML
INJECTION, SOLUTION INTRAVENOUS CONTINUOUS PRN
Status: DISCONTINUED | OUTPATIENT
Start: 2019-09-18 | End: 2019-09-18

## 2019-09-18 RX ORDER — GABAPENTIN 300 MG/1
600 CAPSULE ORAL 3 TIMES DAILY
Status: DISCONTINUED | OUTPATIENT
Start: 2019-09-18 | End: 2019-09-25 | Stop reason: HOSPADM

## 2019-09-18 RX ORDER — HYDROXYZINE HYDROCHLORIDE 25 MG/1
25 TABLET, FILM COATED ORAL EVERY 6 HOURS PRN
Status: DISCONTINUED | OUTPATIENT
Start: 2019-09-18 | End: 2019-09-25 | Stop reason: HOSPADM

## 2019-09-18 RX ORDER — BUPIVACAINE HYDROCHLORIDE AND EPINEPHRINE 5; 5 MG/ML; UG/ML
INJECTION, SOLUTION PERINEURAL PRN
Status: DISCONTINUED | OUTPATIENT
Start: 2019-09-18 | End: 2019-09-18 | Stop reason: HOSPADM

## 2019-09-18 RX ORDER — NALOXONE HYDROCHLORIDE 0.4 MG/ML
.1-.4 INJECTION, SOLUTION INTRAMUSCULAR; INTRAVENOUS; SUBCUTANEOUS
Status: DISCONTINUED | OUTPATIENT
Start: 2019-09-18 | End: 2019-09-18

## 2019-09-18 RX ORDER — NEOSTIGMINE METHYLSULFATE 1 MG/ML
VIAL (ML) INJECTION PRN
Status: DISCONTINUED | OUTPATIENT
Start: 2019-09-18 | End: 2019-09-18

## 2019-09-18 RX ORDER — CLINDAMYCIN PHOSPHATE 900 MG/50ML
900 INJECTION, SOLUTION INTRAVENOUS SEE ADMIN INSTRUCTIONS
Status: DISCONTINUED | OUTPATIENT
Start: 2019-09-18 | End: 2019-09-18

## 2019-09-18 RX ORDER — LIDOCAINE 40 MG/G
CREAM TOPICAL
Status: DISCONTINUED | OUTPATIENT
Start: 2019-09-18 | End: 2019-09-25 | Stop reason: HOSPADM

## 2019-09-18 RX ORDER — NALOXONE HYDROCHLORIDE 0.4 MG/ML
.1-.4 INJECTION, SOLUTION INTRAMUSCULAR; INTRAVENOUS; SUBCUTANEOUS
Status: DISCONTINUED | OUTPATIENT
Start: 2019-09-18 | End: 2019-09-25 | Stop reason: HOSPADM

## 2019-09-18 RX ORDER — SODIUM CHLORIDE 9 MG/ML
INJECTION, SOLUTION INTRAVENOUS CONTINUOUS
Status: DISCONTINUED | OUTPATIENT
Start: 2019-09-18 | End: 2019-09-21

## 2019-09-18 RX ORDER — CITALOPRAM HYDROBROMIDE 20 MG/1
20 TABLET ORAL DAILY
Status: DISCONTINUED | OUTPATIENT
Start: 2019-09-18 | End: 2019-09-25 | Stop reason: HOSPADM

## 2019-09-18 RX ORDER — ACETAMINOPHEN 325 MG/1
975 TABLET ORAL EVERY 8 HOURS
Status: COMPLETED | OUTPATIENT
Start: 2019-09-18 | End: 2019-09-21

## 2019-09-18 RX ORDER — TAMSULOSIN HYDROCHLORIDE 0.4 MG/1
0.4 CAPSULE ORAL DAILY
Status: DISCONTINUED | OUTPATIENT
Start: 2019-09-18 | End: 2019-09-25 | Stop reason: HOSPADM

## 2019-09-18 RX ORDER — ONDANSETRON 2 MG/ML
4 INJECTION INTRAMUSCULAR; INTRAVENOUS EVERY 30 MIN PRN
Status: DISCONTINUED | OUTPATIENT
Start: 2019-09-18 | End: 2019-09-18

## 2019-09-18 RX ORDER — ACETAMINOPHEN 325 MG/1
650 TABLET ORAL EVERY 4 HOURS PRN
Status: DISCONTINUED | OUTPATIENT
Start: 2019-09-21 | End: 2019-09-25 | Stop reason: HOSPADM

## 2019-09-18 RX ORDER — ONDANSETRON 4 MG/1
4 TABLET, ORALLY DISINTEGRATING ORAL EVERY 6 HOURS PRN
Status: DISCONTINUED | OUTPATIENT
Start: 2019-09-18 | End: 2019-09-25 | Stop reason: HOSPADM

## 2019-09-18 RX ORDER — FENTANYL CITRATE 50 UG/ML
25-50 INJECTION, SOLUTION INTRAMUSCULAR; INTRAVENOUS
Status: DISCONTINUED | OUTPATIENT
Start: 2019-09-18 | End: 2019-09-18

## 2019-09-18 RX ORDER — ATORVASTATIN CALCIUM 20 MG/1
20 TABLET, FILM COATED ORAL DAILY
Status: DISCONTINUED | OUTPATIENT
Start: 2019-09-18 | End: 2019-09-25 | Stop reason: HOSPADM

## 2019-09-18 RX ORDER — GLYCOPYRROLATE 0.2 MG/ML
INJECTION, SOLUTION INTRAMUSCULAR; INTRAVENOUS PRN
Status: DISCONTINUED | OUTPATIENT
Start: 2019-09-18 | End: 2019-09-18

## 2019-09-18 RX ORDER — AMOXICILLIN 250 MG
2 CAPSULE ORAL 2 TIMES DAILY
Status: DISCONTINUED | OUTPATIENT
Start: 2019-09-18 | End: 2019-09-25 | Stop reason: HOSPADM

## 2019-09-18 RX ORDER — HYDROMORPHONE HYDROCHLORIDE 1 MG/ML
.3-.5 INJECTION, SOLUTION INTRAMUSCULAR; INTRAVENOUS; SUBCUTANEOUS EVERY 5 MIN PRN
Status: DISCONTINUED | OUTPATIENT
Start: 2019-09-18 | End: 2019-09-18

## 2019-09-18 RX ORDER — PROPOFOL 10 MG/ML
INJECTION, EMULSION INTRAVENOUS CONTINUOUS PRN
Status: DISCONTINUED | OUTPATIENT
Start: 2019-09-18 | End: 2019-09-18

## 2019-09-18 RX ORDER — SODIUM CHLORIDE, SODIUM LACTATE, POTASSIUM CHLORIDE, CALCIUM CHLORIDE 600; 310; 30; 20 MG/100ML; MG/100ML; MG/100ML; MG/100ML
INJECTION, SOLUTION INTRAVENOUS CONTINUOUS
Status: DISCONTINUED | OUTPATIENT
Start: 2019-09-18 | End: 2019-09-18

## 2019-09-18 RX ORDER — GABAPENTIN 300 MG/1
300 CAPSULE ORAL
Status: COMPLETED | OUTPATIENT
Start: 2019-09-18 | End: 2019-09-18

## 2019-09-18 RX ORDER — OXYCODONE HYDROCHLORIDE 5 MG/1
5-10 TABLET ORAL
Status: DISCONTINUED | OUTPATIENT
Start: 2019-09-18 | End: 2019-09-25 | Stop reason: HOSPADM

## 2019-09-18 RX ORDER — CALCIUM CARBONATE 500 MG/1
1000 TABLET, CHEWABLE ORAL 4 TIMES DAILY PRN
Status: DISCONTINUED | OUTPATIENT
Start: 2019-09-18 | End: 2019-09-25 | Stop reason: HOSPADM

## 2019-09-18 RX ORDER — METOCLOPRAMIDE 10 MG/1
10 TABLET ORAL EVERY 6 HOURS PRN
Status: DISCONTINUED | OUTPATIENT
Start: 2019-09-18 | End: 2019-09-25 | Stop reason: HOSPADM

## 2019-09-18 RX ORDER — METOCLOPRAMIDE HYDROCHLORIDE 5 MG/ML
10 INJECTION INTRAMUSCULAR; INTRAVENOUS EVERY 6 HOURS PRN
Status: DISCONTINUED | OUTPATIENT
Start: 2019-09-18 | End: 2019-09-25 | Stop reason: HOSPADM

## 2019-09-18 RX ORDER — METOPROLOL TARTRATE 50 MG
100 TABLET ORAL 2 TIMES DAILY
Status: DISCONTINUED | OUTPATIENT
Start: 2019-09-18 | End: 2019-09-25 | Stop reason: HOSPADM

## 2019-09-18 RX ORDER — ONDANSETRON 2 MG/ML
4 INJECTION INTRAMUSCULAR; INTRAVENOUS EVERY 6 HOURS PRN
Status: DISCONTINUED | OUTPATIENT
Start: 2019-09-18 | End: 2019-09-25 | Stop reason: HOSPADM

## 2019-09-18 RX ORDER — HYDROCHLOROTHIAZIDE 25 MG/1
25 TABLET ORAL DAILY
Status: DISCONTINUED | OUTPATIENT
Start: 2019-09-19 | End: 2019-09-18

## 2019-09-18 RX ORDER — ALBUTEROL SULFATE 90 UG/1
AEROSOL, METERED RESPIRATORY (INHALATION) PRN
Status: DISCONTINUED | OUTPATIENT
Start: 2019-09-18 | End: 2019-09-18

## 2019-09-18 RX ORDER — DEXAMETHASONE SODIUM PHOSPHATE 4 MG/ML
INJECTION, SOLUTION INTRA-ARTICULAR; INTRALESIONAL; INTRAMUSCULAR; INTRAVENOUS; SOFT TISSUE PRN
Status: DISCONTINUED | OUTPATIENT
Start: 2019-09-18 | End: 2019-09-18

## 2019-09-18 RX ORDER — ONDANSETRON 4 MG/1
4 TABLET, ORALLY DISINTEGRATING ORAL EVERY 30 MIN PRN
Status: DISCONTINUED | OUTPATIENT
Start: 2019-09-18 | End: 2019-09-18

## 2019-09-18 RX ORDER — AMOXICILLIN 250 MG
1 CAPSULE ORAL 2 TIMES DAILY
Status: DISCONTINUED | OUTPATIENT
Start: 2019-09-18 | End: 2019-09-25 | Stop reason: HOSPADM

## 2019-09-18 RX ORDER — CLINDAMYCIN PHOSPHATE 900 MG/50ML
900 INJECTION, SOLUTION INTRAVENOUS
Status: DISCONTINUED | OUTPATIENT
Start: 2019-09-18 | End: 2019-09-18

## 2019-09-18 RX ORDER — PROCHLORPERAZINE MALEATE 10 MG
10 TABLET ORAL EVERY 6 HOURS PRN
Status: DISCONTINUED | OUTPATIENT
Start: 2019-09-18 | End: 2019-09-25 | Stop reason: HOSPADM

## 2019-09-18 RX ORDER — CLINDAMYCIN PHOSPHATE 900 MG/50ML
900 INJECTION, SOLUTION INTRAVENOUS EVERY 8 HOURS
Status: COMPLETED | OUTPATIENT
Start: 2019-09-18 | End: 2019-09-19

## 2019-09-18 RX ORDER — PROPOFOL 10 MG/ML
INJECTION, EMULSION INTRAVENOUS PRN
Status: DISCONTINUED | OUTPATIENT
Start: 2019-09-18 | End: 2019-09-18

## 2019-09-18 RX ORDER — FENTANYL CITRATE 50 UG/ML
INJECTION, SOLUTION INTRAMUSCULAR; INTRAVENOUS PRN
Status: DISCONTINUED | OUTPATIENT
Start: 2019-09-18 | End: 2019-09-18

## 2019-09-18 RX ORDER — ALBUTEROL SULFATE 90 UG/1
2 AEROSOL, METERED RESPIRATORY (INHALATION) EVERY 6 HOURS PRN
Status: DISCONTINUED | OUTPATIENT
Start: 2019-09-18 | End: 2019-09-25 | Stop reason: HOSPADM

## 2019-09-18 RX ORDER — ONDANSETRON 2 MG/ML
INJECTION INTRAMUSCULAR; INTRAVENOUS PRN
Status: DISCONTINUED | OUTPATIENT
Start: 2019-09-18 | End: 2019-09-18

## 2019-09-18 RX ADMIN — NEOSTIGMINE METHYLSULFATE 4 MG: 1 INJECTION, SOLUTION INTRAVENOUS at 16:12

## 2019-09-18 RX ADMIN — CLINDAMYCIN PHOSPHATE 900 MG: 18 INJECTION, SOLUTION INTRAVENOUS at 13:30

## 2019-09-18 RX ADMIN — OXYCODONE HYDROCHLORIDE 10 MG: 5 TABLET ORAL at 21:05

## 2019-09-18 RX ADMIN — PROPOFOL 200 MCG/KG/MIN: 10 INJECTION, EMULSION INTRAVENOUS at 13:06

## 2019-09-18 RX ADMIN — ALBUTEROL SULFATE 2 PUFF: 90 AEROSOL, METERED RESPIRATORY (INHALATION) at 13:09

## 2019-09-18 RX ADMIN — ALBUTEROL SULFATE 2 PUFF: 90 AEROSOL, METERED RESPIRATORY (INHALATION) at 13:10

## 2019-09-18 RX ADMIN — ATORVASTATIN CALCIUM 20 MG: 20 TABLET, FILM COATED ORAL at 20:49

## 2019-09-18 RX ADMIN — PHENYLEPHRINE HYDROCHLORIDE 100 MCG: 10 INJECTION INTRAVENOUS at 13:45

## 2019-09-18 RX ADMIN — PROPOFOL 50 MG: 10 INJECTION, EMULSION INTRAVENOUS at 13:09

## 2019-09-18 RX ADMIN — PROPOFOL 50 MG: 10 INJECTION, EMULSION INTRAVENOUS at 13:10

## 2019-09-18 RX ADMIN — HYDROMORPHONE HYDROCHLORIDE 0.5 MG: 1 INJECTION, SOLUTION INTRAMUSCULAR; INTRAVENOUS; SUBCUTANEOUS at 17:30

## 2019-09-18 RX ADMIN — TAMSULOSIN HYDROCHLORIDE 0.4 MG: 0.4 CAPSULE ORAL at 20:49

## 2019-09-18 RX ADMIN — FENTANYL CITRATE 50 MCG: 50 INJECTION, SOLUTION INTRAMUSCULAR; INTRAVENOUS at 13:06

## 2019-09-18 RX ADMIN — PHENYLEPHRINE HYDROCHLORIDE 100 MCG: 10 INJECTION INTRAVENOUS at 13:25

## 2019-09-18 RX ADMIN — FENTANYL CITRATE 50 MCG: 50 INJECTION, SOLUTION INTRAMUSCULAR; INTRAVENOUS at 13:55

## 2019-09-18 RX ADMIN — CITALOPRAM HYDROBROMIDE 20 MG: 20 TABLET ORAL at 20:49

## 2019-09-18 RX ADMIN — SODIUM CHLORIDE, POTASSIUM CHLORIDE, SODIUM LACTATE AND CALCIUM CHLORIDE: 600; 310; 30; 20 INJECTION, SOLUTION INTRAVENOUS at 12:56

## 2019-09-18 RX ADMIN — PROPOFOL 180 MG: 10 INJECTION, EMULSION INTRAVENOUS at 13:06

## 2019-09-18 RX ADMIN — PHENYLEPHRINE HYDROCHLORIDE 0.25 MCG/KG/MIN: 10 INJECTION INTRAVENOUS at 13:33

## 2019-09-18 RX ADMIN — SENNOSIDES AND DOCUSATE SODIUM 2 TABLET: 8.6; 5 TABLET ORAL at 20:49

## 2019-09-18 RX ADMIN — HYDROMORPHONE HYDROCHLORIDE 0.5 MG: 1 INJECTION, SOLUTION INTRAMUSCULAR; INTRAVENOUS; SUBCUTANEOUS at 19:06

## 2019-09-18 RX ADMIN — ALBUTEROL SULFATE 2 PUFF: 90 AEROSOL, METERED RESPIRATORY (INHALATION) at 13:11

## 2019-09-18 RX ADMIN — PHENYLEPHRINE HYDROCHLORIDE 100 MCG: 10 INJECTION INTRAVENOUS at 13:10

## 2019-09-18 RX ADMIN — PHENYLEPHRINE HYDROCHLORIDE 100 MCG: 10 INJECTION INTRAVENOUS at 13:15

## 2019-09-18 RX ADMIN — ROCURONIUM BROMIDE 5 MG: 10 INJECTION INTRAVENOUS at 15:00

## 2019-09-18 RX ADMIN — GABAPENTIN 300 MG: 300 CAPSULE ORAL at 12:52

## 2019-09-18 RX ADMIN — HYDROMORPHONE HYDROCHLORIDE 0.5 MG: 1 INJECTION, SOLUTION INTRAMUSCULAR; INTRAVENOUS; SUBCUTANEOUS at 14:58

## 2019-09-18 RX ADMIN — RANITIDINE 150 MG: 150 TABLET ORAL at 20:49

## 2019-09-18 RX ADMIN — DEXAMETHASONE SODIUM PHOSPHATE 4 MG: 4 INJECTION, SOLUTION INTRA-ARTICULAR; INTRALESIONAL; INTRAMUSCULAR; INTRAVENOUS; SOFT TISSUE at 13:15

## 2019-09-18 RX ADMIN — CLINDAMYCIN PHOSPHATE 900 MG: 900 INJECTION, SOLUTION INTRAVENOUS at 21:14

## 2019-09-18 RX ADMIN — LIDOCAINE HYDROCHLORIDE 80 MG: 20 INJECTION, SOLUTION INFILTRATION; PERINEURAL at 13:06

## 2019-09-18 RX ADMIN — METOPROLOL TARTRATE 100 MG: 50 TABLET ORAL at 20:48

## 2019-09-18 RX ADMIN — SODIUM CHLORIDE, POTASSIUM CHLORIDE, SODIUM LACTATE AND CALCIUM CHLORIDE: 600; 310; 30; 20 INJECTION, SOLUTION INTRAVENOUS at 13:20

## 2019-09-18 RX ADMIN — ACETAMINOPHEN 975 MG: 325 TABLET, FILM COATED ORAL at 20:48

## 2019-09-18 RX ADMIN — MIDAZOLAM 1 MG: 1 INJECTION INTRAMUSCULAR; INTRAVENOUS at 12:56

## 2019-09-18 RX ADMIN — ROCURONIUM BROMIDE 20 MG: 10 INJECTION INTRAVENOUS at 13:38

## 2019-09-18 RX ADMIN — PHENYLEPHRINE HYDROCHLORIDE 100 MCG: 10 INJECTION INTRAVENOUS at 13:32

## 2019-09-18 RX ADMIN — REMIFENTANIL HYDROCHLORIDE 0.1 MCG/KG/MIN: 1 INJECTION, POWDER, LYOPHILIZED, FOR SOLUTION INTRAVENOUS at 13:06

## 2019-09-18 RX ADMIN — ACETAMINOPHEN 975 MG: 325 TABLET, FILM COATED ORAL at 12:52

## 2019-09-18 RX ADMIN — GABAPENTIN 600 MG: 300 CAPSULE ORAL at 21:04

## 2019-09-18 RX ADMIN — ONDANSETRON 4 MG: 2 INJECTION INTRAMUSCULAR; INTRAVENOUS at 13:15

## 2019-09-18 RX ADMIN — SODIUM CHLORIDE: 9 INJECTION, SOLUTION INTRAVENOUS at 21:19

## 2019-09-18 RX ADMIN — PHENYLEPHRINE HYDROCHLORIDE 100 MCG: 10 INJECTION INTRAVENOUS at 13:12

## 2019-09-18 RX ADMIN — OXYCODONE HYDROCHLORIDE 10 MG: 5 TABLET ORAL at 23:57

## 2019-09-18 RX ADMIN — GLYCOPYRROLATE 0.8 MG: 0.2 INJECTION, SOLUTION INTRAMUSCULAR; INTRAVENOUS at 16:12

## 2019-09-18 ASSESSMENT — ACTIVITIES OF DAILY LIVING (ADL): ADLS_ACUITY_SCORE: 21

## 2019-09-18 ASSESSMENT — LIFESTYLE VARIABLES: TOBACCO_USE: 0

## 2019-09-18 NOTE — OR NURSING
Patient only able to move toes slightly on right.  Unable to bear any weight.  C/o numbness to legs.

## 2019-09-18 NOTE — ANESTHESIA POSTPROCEDURE EVALUATION
Patient: Elaina Valenzuela    Procedure(s):  T9-10 THORASIC LAMINECTOMY WITH PRE-PROCEDURE NEURO MONITORING    Diagnosis:THORACIC MYELOPATHY  Diagnosis Additional Information: No value filed.    Anesthesia Type:  General, ETT    Note:  Anesthesia Post Evaluation    Patient location during evaluation: PACU  Patient participation: Able to fully participate in evaluation  Level of consciousness: awake  Pain management: adequate  Airway patency: patent  Cardiovascular status: acceptable  Respiratory status: acceptable  Hydration status: acceptable  PONV: none             Last vitals:  Vitals:    09/18/19 1626 09/18/19 1630 09/18/19 1645   BP: 118/76 117/71    Pulse: 98 90    Resp: 20 14 10   Temp: 36.5  C (97.7  F)     SpO2: 98% 99%          Electronically Signed By: Brenden Rock MD  September 18, 2019  5:13 PM

## 2019-09-18 NOTE — ANESTHESIA PREPROCEDURE EVALUATION
Anesthesia Pre-Procedure Evaluation    Patient: Elaina Valenzuela   MRN: [id number] : 1960          Preoperative Diagnosis: THORACIC MYELOPATHY    Procedure(s):  T9-10 LAMINECTOMY (C-ARM, MIDAS JUAN, LEICA MICROSCOPE, COMPREHENSIVE SPINAL MONITORING (OFFICE WILL SET UP ) LANGUAGE:  Angolan    Past Medical History:   Diagnosis Date     Chronic back pain      HTN (hypertension)      Left leg weakness      Mild intermittent asthma      Neurogenic bladder      Past Surgical History:   Procedure Laterality Date     C NONSPECIFIC PROCEDURE      Rt. shoulder cyst removed, at [facility]     COLONOSCOPY  2016    repeat in 1 year due to very poor prep     COLONOSCOPY N/A 2016    Procedure: COLONOSCOPY;  Surgeon: Jean Guo MD;  Location: [facility] OR     COLONOSCOPY WITH CO2 INSUFFLATION N/A 2016    Procedure: COLONOSCOPY WITH CO2 INSUFFLATION;  Surgeon: Jean Guo MD;  Location: [facility] OR     LAMINECT/DISCECTOMY, LUMBAR  3/19/2010    L4-5 herniated disc with severe left leg neruopathy      SURGICAL HISTORY OF -   3/23/2010    removal of meningial tumor of thoracic spine       Anesthesia Evaluation     . Pt has had prior anesthetic. Type: General    No history of anesthetic complications          ROS/MED HX    ENT/Pulmonary:     (+)Intermittent asthma , . .   (-) tobacco use   Neurologic: Comment: 1. Cervical spine MR: Moderately limited due to motion, but moderate spinal canal stenoses at C4-5 and C5-6 with cord indentation. Questionably myelopathic signal within the cord versus motion artifact on the right at the level of C4.  2. Thoracic spine MR : Presumed laminectomies from T7-T8, with scar tissue but no significant spinal canal stenoses at those levels. However, just below the level at T8-9 there is a moderate to severe spinal canal stenosis, and at T9-10 there is a focal severe spinal canal stenosis. Degree of spinal canal stenosis with cord compression obscures evaluation for abnormal cord  signal.  3. No abnormal enhancement or signal within the cervical or thoracic  spinal cord.    (+)neuropathy - L leg weakness, other neuro Hx of spinal meningioma    Cardiovascular:     (+) Dyslipidemia, hypertension----. : . . . :. . Previous cardiac testing date:results:date: results:ECG reviewed date:9/6/19 results:NSR date: results:          METS/Exercise Tolerance:  3 - Able to walk 1-2 blocks without stopping   Hematologic:         Musculoskeletal:   (+) arthritis,  other musculoskeletal- Lumbago      GI/Hepatic:         Renal/Genitourinary:     (+) Other Renal/ Genitourinary, Neurogenic bladder      Endo:     (+) Obesity, .      Psychiatric:     (+) psychiatric history depression      Infectious Disease:         Malignancy:         Other:    (+) H/O Chronic Pain,other significant disability Other (comment)                        Physical Exam  Normal systems: cardiovascular and dental    Airway   Mallampati: II  TM distance: >3 FB  Neck ROM: full    Dental     Cardiovascular       Pulmonary             Lab Results   Component Value Date    WBC 13.1 (H) 01/19/2016    HGB 16.0 01/19/2016    HCT 47.7 01/19/2016     01/19/2016     05/17/2019    POTASSIUM 3.8 05/17/2019    CHLORIDE 103 05/17/2019    CO2 29 05/17/2019    BUN 15 05/17/2019    CR 0.90 05/17/2019    GLC 96 05/17/2019    ADA 9.5 05/17/2019    ALBUMIN 4.2 05/17/2019    PROTTOTAL 8.4 05/17/2019    ALT 24 05/17/2019    AST 20 05/17/2019    ALKPHOS 77 05/17/2019    BILITOTAL 0.8 05/17/2019    TSH 0.85 01/06/2010    T4 1.30 01/06/2010       Preop Vitals  BP Readings from Last 3 Encounters:   09/06/19 110/72   08/29/19 127/79   08/08/19 120/79    Pulse Readings from Last 3 Encounters:   09/06/19 73   08/08/19 65   08/02/19 79      Resp Readings from Last 3 Encounters:   09/06/19 16   08/08/19 18   08/02/19 18    SpO2 Readings from Last 3 Encounters:   09/06/19 95%   08/08/19 94%   08/02/19 92%      Temp Readings from Last 1 Encounters:  "  09/06/19 36.7  C (98  F) (Oral)    Ht Readings from Last 1 Encounters:   07/23/18 1.651 m (5' 5\")      Wt Readings from Last 1 Encounters:   03/12/18 81.2 kg (179 lb)    Estimated body mass index is 29.79 kg/m  as calculated from the following:    Height as of 7/23/18: 1.651 m (5' 5\").    Weight as of 3/12/18: 81.2 kg (179 lb).       Anesthesia Plan      History & Physical Review  History and physical reviewed and following examination; no interval change.    ASA Status:  3 .    NPO Status:  > 8 hours    Plan for General and ETT with Intravenous and Propofol induction. Maintenance will be TIVA.    PONV prophylaxis:  Ondansetron (or other 5HT-3)  Additional equipment: Videolaryngoscope SSEPs being monitored, maintenance with Propofol gtt, tory gtt.      Postoperative Care  Postoperative pain management:  IV analgesics, Oral pain medications and Multi-modal analgesia.      Consents  Anesthetic plan, risks, benefits and alternatives discussed with:  Patient (via telephone , however patient understands English well)..                 Brenden Rock MD  "

## 2019-09-18 NOTE — ANESTHESIA CARE TRANSFER NOTE
Patient: Elaina Philaphon    Procedure(s):  T9-10 THORASIC LAMINECTOMY WITH PRE-PROCEDURE NEURO MONITORING    Diagnosis: THORACIC MYELOPATHY  Diagnosis Additional Information: No value filed.    Anesthesia Type:   General, ETT     Note:  Airway :Face Mask  Patient transferred to:PACU  Comments: To pacu. Vss. Report given to RN assuming care of ptHandoff Report: Identifed the Patient, Identified the Reponsible Provider, Reviewed the pertinent medical history, Discussed the surgical course, Reviewed Intra-OP anesthesia mangement and issues during anesthesia, Set expectations for post-procedure period and Allowed opportunity for questions and acknowledgement of understanding      Vitals: (Last set prior to Anesthesia Care Transfer)    CRNA VITALS  9/18/2019 1553 - 9/18/2019 1631      9/18/2019             Pulse:  104    SpO2:  96 %    Resp Rate (set):  10                Electronically Signed By: AMIE Banda CRNA  September 18, 2019  4:31 PM

## 2019-09-18 NOTE — BRIEF OP NOTE
[facility]    Brief Operative Note    Pre-operative diagnosis: THORACIC MYELOPATHY  Post-operative diagnosis same  Procedure: Procedure(s):  T9-10 THORASIC LAMINECTOMY WITH PRE-PROCEDURE NEURO MONITORING  Surgeon: Surgeon(s) and Role:     * Reed Munguia MD - Primary     * Leonardo Bhatia PA-C - Assisting     * Juliana Cardenas PA-C - Assisting  Anesthesia: General   Estimated blood loss: 100 mL  Drains: None  Specimens:   ID Type Source Tests Collected by Time Destination   A : T-9 THORACIC EPIDURAL TISSUE  Tissue Other SURGICAL PATHOLOGY EXAM Reed Munguia MD 9/18/2019  3:20 PM      Findings:   None.  Complications: None.  Implants:    Implant Name Type Inv. Item Serial No.  Lot No. LRB No. Used   GRAFT DURAGEN 3X3&quot; [id number] Bone/Tissue/Biologic GRAFT DURAGEN 3X3&quot; ID-[id number]  [employer] [id number] N/A 1   SEALANT DURA SEAL 5ML  Other SEALANT DURA SEAL 5ML   [employer] [id number] N/A 1

## 2019-09-18 NOTE — OP NOTE
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  T9-10 bilateral laminectomy for decompression of central stenosis  2.  Use of intraoperative microscope and fluoroscopy  3.  Use of neuro-monitoring    EBL: 100 mL    Indications: 58-year-old male with history of L4-5 fusion and T7-8 laminectomy for resection of meningioma.  Presented with a year of progressive severe weakness in the legs, ultimately resulting in near paralysis and wheelchair usage.  Imaging showed severe cord compression at T9-10, with marked ossification of the ligamentum flavum and facet hypertrophy.  Underwent non-operative management with failure to improve.  Risks, benefits, indications, and alternatives were discussed with the patient in detail, and he wished to proceed.    Description of surgery: After intubation and insertion of lines, neuro-monitoring attempted to establish baseline signals, and minimal signals were obtained.  Thus, we decided to proceed without monitoring.  The patient was positioned prone.  Sterile prepping and draping procedures were performed.  Antibiotics were administered and timeout was performed.  A midline thoracic incision was performed.  The monopolar was used to expose the spinous processes, lamina, and medial facets at T9-10.  Fluoroscopy was used to verify the correct level.  The high speed drill was then used to perform  bilateral laminectomies and medial facetectomies.  Marked ossification of the ligamentum flavum and dura, with intradural invasion of the bony fragments was noted.  The abnormal bony tissue was carefully dissected from the arachnoid and remaining dura, with decompression of the spinal cord, and was removed with the Kerrison rongeurs.  Thus, the spinal cord was decompressed.  The dura was re-enforced with duragen and duraseal.  Antibiotic irrigation was performed and hemostasis achieved.  The fascia was closed with 0-Vicryl sutures, and the dermal layer was closed with 2-0 vicryl sutures.  There were no intraprocedural complications.

## 2019-09-19 ENCOUNTER — APPOINTMENT (OUTPATIENT)
Dept: PHYSICAL THERAPY | Facility: CLINIC | Age: 59
DRG: 518 | End: 2019-09-19
Attending: PHYSICIAN ASSISTANT
Payer: COMMERCIAL

## 2019-09-19 LAB
ANION GAP SERPL CALCULATED.3IONS-SCNC: 6 MMOL/L (ref 3–14)
BUN SERPL-MCNC: 27 MG/DL (ref 7–30)
CALCIUM SERPL-MCNC: 8 MG/DL (ref 8.5–10.1)
CHLORIDE SERPL-SCNC: 101 MMOL/L (ref 94–109)
CO2 SERPL-SCNC: 30 MMOL/L (ref 20–32)
CREAT SERPL-MCNC: 0.92 MG/DL (ref 0.66–1.25)
GFR SERPL CREATININE-BSD FRML MDRD: >90 ML/MIN/{1.73_M2}
GLUCOSE BLDC GLUCOMTR-MCNC: 105 MG/DL (ref 70–99)
GLUCOSE SERPL-MCNC: 96 MG/DL (ref 70–99)
HGB BLD-MCNC: 11.6 G/DL (ref 13.3–17.7)
POTASSIUM SERPL-SCNC: 3.6 MMOL/L (ref 3.4–5.3)
SODIUM SERPL-SCNC: 137 MMOL/L (ref 133–144)

## 2019-09-19 PROCEDURE — 36415 COLL VENOUS BLD VENIPUNCTURE: CPT | Performed by: PHYSICIAN ASSISTANT

## 2019-09-19 PROCEDURE — 00000146 ZZHCL STATISTIC GLUCOSE BY METER IP

## 2019-09-19 PROCEDURE — 25000128 H RX IP 250 OP 636: Performed by: PHYSICIAN ASSISTANT

## 2019-09-19 PROCEDURE — 12000000 ZZH R&B MED SURG/OB

## 2019-09-19 PROCEDURE — 97161 PT EVAL LOW COMPLEX 20 MIN: CPT | Mod: GP | Performed by: PHYSICAL THERAPIST

## 2019-09-19 PROCEDURE — 85018 HEMOGLOBIN: CPT | Performed by: PHYSICIAN ASSISTANT

## 2019-09-19 PROCEDURE — 99232 SBSQ HOSP IP/OBS MODERATE 35: CPT | Performed by: INTERNAL MEDICINE

## 2019-09-19 PROCEDURE — 25000132 ZZH RX MED GY IP 250 OP 250 PS 637: Performed by: PHYSICIAN ASSISTANT

## 2019-09-19 PROCEDURE — 80048 BASIC METABOLIC PNL TOTAL CA: CPT | Performed by: PHYSICIAN ASSISTANT

## 2019-09-19 PROCEDURE — 25000125 ZZHC RX 250: Performed by: PHYSICIAN ASSISTANT

## 2019-09-19 PROCEDURE — 97530 THERAPEUTIC ACTIVITIES: CPT | Mod: GP | Performed by: PHYSICAL THERAPIST

## 2019-09-19 RX ORDER — DOXYCYCLINE 100 MG/1
100 CAPSULE ORAL 2 TIMES DAILY
Status: ON HOLD | COMMUNITY
Start: 2019-09-13 | End: 2019-09-25

## 2019-09-19 RX ORDER — NAPROXEN 500 MG/1
500 TABLET ORAL 2 TIMES DAILY WITH MEALS
Status: ON HOLD | COMMUNITY
End: 2019-09-25

## 2019-09-19 RX ADMIN — GABAPENTIN 600 MG: 300 CAPSULE ORAL at 15:40

## 2019-09-19 RX ADMIN — ACETAMINOPHEN 975 MG: 325 TABLET, FILM COATED ORAL at 04:08

## 2019-09-19 RX ADMIN — GABAPENTIN 600 MG: 300 CAPSULE ORAL at 08:18

## 2019-09-19 RX ADMIN — SENNOSIDES AND DOCUSATE SODIUM 2 TABLET: 8.6; 5 TABLET ORAL at 20:24

## 2019-09-19 RX ADMIN — OXYCODONE HYDROCHLORIDE 10 MG: 5 TABLET ORAL at 23:30

## 2019-09-19 RX ADMIN — ACETAMINOPHEN 975 MG: 325 TABLET, FILM COATED ORAL at 13:00

## 2019-09-19 RX ADMIN — GABAPENTIN 600 MG: 300 CAPSULE ORAL at 21:51

## 2019-09-19 RX ADMIN — OXYCODONE HYDROCHLORIDE 10 MG: 5 TABLET ORAL at 19:28

## 2019-09-19 RX ADMIN — CITALOPRAM HYDROBROMIDE 20 MG: 20 TABLET ORAL at 08:18

## 2019-09-19 RX ADMIN — TAMSULOSIN HYDROCHLORIDE 0.4 MG: 0.4 CAPSULE ORAL at 08:18

## 2019-09-19 RX ADMIN — OXYCODONE HYDROCHLORIDE 10 MG: 5 TABLET ORAL at 13:00

## 2019-09-19 RX ADMIN — SENNOSIDES AND DOCUSATE SODIUM 2 TABLET: 8.6; 5 TABLET ORAL at 08:18

## 2019-09-19 RX ADMIN — OXYCODONE HYDROCHLORIDE 10 MG: 5 TABLET ORAL at 05:55

## 2019-09-19 RX ADMIN — HYDROMORPHONE HYDROCHLORIDE 0.5 MG: 1 INJECTION, SOLUTION INTRAMUSCULAR; INTRAVENOUS; SUBCUTANEOUS at 20:24

## 2019-09-19 RX ADMIN — OXYCODONE HYDROCHLORIDE 10 MG: 5 TABLET ORAL at 03:12

## 2019-09-19 RX ADMIN — RANITIDINE 150 MG: 150 TABLET ORAL at 08:18

## 2019-09-19 RX ADMIN — CLINDAMYCIN PHOSPHATE 900 MG: 900 INJECTION, SOLUTION INTRAVENOUS at 04:08

## 2019-09-19 RX ADMIN — RANITIDINE 150 MG: 150 TABLET ORAL at 20:24

## 2019-09-19 RX ADMIN — METOPROLOL TARTRATE 100 MG: 50 TABLET ORAL at 20:24

## 2019-09-19 RX ADMIN — OXYCODONE HYDROCHLORIDE 10 MG: 5 TABLET ORAL at 16:27

## 2019-09-19 RX ADMIN — ACETAMINOPHEN 975 MG: 325 TABLET, FILM COATED ORAL at 20:23

## 2019-09-19 RX ADMIN — ATORVASTATIN CALCIUM 20 MG: 20 TABLET, FILM COATED ORAL at 08:18

## 2019-09-19 RX ADMIN — METHOCARBAMOL TABLETS 750 MG: 750 TABLET, COATED ORAL at 15:40

## 2019-09-19 RX ADMIN — FLUTICASONE FUROATE AND VILANTEROL TRIFENATATE 1 PUFF: 100; 25 POWDER RESPIRATORY (INHALATION) at 13:00

## 2019-09-19 RX ADMIN — METHOCARBAMOL TABLETS 750 MG: 750 TABLET, COATED ORAL at 01:25

## 2019-09-19 RX ADMIN — METHOCARBAMOL TABLETS 750 MG: 750 TABLET, COATED ORAL at 08:18

## 2019-09-19 ASSESSMENT — ACTIVITIES OF DAILY LIVING (ADL)
ADLS_ACUITY_SCORE: 27
ADLS_ACUITY_SCORE: 28
ADLS_ACUITY_SCORE: 27
ADLS_ACUITY_SCORE: 28
ADLS_ACUITY_SCORE: 27
ADLS_ACUITY_SCORE: 27

## 2019-09-19 NOTE — PROGRESS NOTES
[facility]    Neurosurgery  Daily Post-Op Note    Assessment & Plan   Procedure(s):  T9-10 THORASIC LAMINECTOMY WITH PRE-PROCEDURE NEURO MONITORING   -1 Day Post-Op  Doing well.  He notes some incisional pain, well controlled with current regimen. He states numbness in his legs improves when he is on his usual gabapentin 600 TID, so we will make sure he continues that schedule.     Plan:  -Advance activity as tolerated  -Continue supportive and symptomatic treatment  -work with PT/OT today      Leonardo Bhatia    Interval History   Stable    Physical Exam   Temp: 98.2  F (36.8  C) Temp src: Oral BP: 92/63 Pulse: 77 Heart Rate: 67 Resp: 20 SpO2: 91 % O2 Device: None (Room air) Oxygen Delivery: 2 LPM  There were no vitals filed for this visit.  Vital Signs with Ranges  Temp:  [96.7  F (35.9  C)-98.2  F (36.8  C)] 98.2  F (36.8  C)  Pulse:  [72-98] 77  Heart Rate:  [65-98] 67  Resp:  [10-23] 20  BP: ()/(63-92) 92/63  SpO2:  [91 %-99 %] 91 %  I/O last 3 completed shifts:  In: 1600 [I.V.:1600]  Out: 755 [Urine:655; Blood:100]    Alert and oriented.  Has trace movement in right foot, able to slightly wiggle toes.       Incision: CDI      Medications     sodium chloride 75 mL/hr at 09/19/19 0600        acetaminophen  975 mg Oral Q8H     atorvastatin  20 mg Oral Daily     citalopram  20 mg Oral Daily     ranitidine  150 mg Oral Q12H    Or     famotidine  20 mg Intravenous Q12H     fluticasone-vilanterol  1 puff Inhalation Daily     gabapentin  600 mg Oral TID     metoprolol tartrate  100 mg Oral BID     senna-docusate  1 tablet Oral BID    Or     senna-docusate  2 tablet Oral BID     sodium chloride (PF)  3 mL Intracatheter Q8H     tamsulosin  0.4 mg Oral Daily       Data         Leonardo Bhatia PAViktorC  [facility]

## 2019-09-19 NOTE — CONSULTS
Consult Date:  09/18/2019      REQUESTING PHYSICIAN:  Leonardo Bhatia PA-C      REASON FOR CONSULTATION:  Hospitalist consult.      HISTORY OF PRESENT ILLNESS:  Elaina Valenzuela is a very pleasant 58-year-old gentleman with a past medical history significant for hypertension, mild intermittent asthma, neurogenic bladder, hyperlipidemia, history of spinal meningioma, status post L4 through L5 fusion and T8 through T9 laminectomy for resection of intradural meningioma, and paraplegia, who is postoperative day 0 status post T9 through T10 bilateral laminectomy with decompression of central stenosis.  The procedure was performed by Dr. Munguia under general anesthesia with an estimated blood loss of 100 mL.  The patient has a somewhat complicated history, with prior thoracic laminectomy for meningioma.  He reports he has been unable to walk for the past 2 years and has been wheelchair-bound.  Recent imaging showed severe cord compression at T9 to T10.  He presents today for the above intervention.  The patient is presently evaluated in his room on the Neuro floor.  He reports he is doing well after surgery.  Pain is improved with recent administration of pain medications.  English is not the patient's first language, and I did offer  services for our conversation.  The patient declines this and feels comfortable communicating in English.      On exam, he is able to wiggle toes on bilateral feet minimally.  He says this is not significantly changed since prior to surgery.  He also notes bilateral lower extremity numbness from thigh down, which he says is also unchanged.  He denies any chest pain, shortness of breath, wheezing, nausea, vomiting, visual changes.  He is tolerating p.o.  He reports that his asthma symptoms wax and wane, but he currently denies any symptoms at this time.  He reports he did not take any of his medications prior to surgery today, as advised.  He does not have a chronic Paulson catheter.  He  reports he lives in a house with his wife and son.      REVIEW OF SYSTEMS:  A 10-point review of systems was conducted and is negative aside from the information in the HPI.      PAST MEDICAL HISTORY:   1.  Hypertension.   2.  Mild persistent asthma.  Per preoperative H and P, patient had recently stopped using his Advair, as insurance had stopped paying for this.  He was using a rescue inhaler multiple times a day, and PCP advised he restart on this medication.   3.  History of neurogenic bladder.   4.  Hyperlipidemia.   5.  Paraplegia.   6.  History of spinal meningioma, status post L4 through L5 fusion and T8 through T9 laminectomy for resection of intradural meningioma.      PAST SURGICAL HISTORY:    Past Surgical History:   Procedure Laterality Date     C NONSPECIFIC PROCEDURE      Rt. shoulder cyst removed, at [facility]     COLONOSCOPY  5/2016    repeat in 1 year due to very poor prep     COLONOSCOPY N/A 5/19/2016    Procedure: COLONOSCOPY;  Surgeon: Jean Guo MD;  Location: [facility] OR     COLONOSCOPY WITH CO2 INSUFFLATION N/A 5/19/2016    Procedure: COLONOSCOPY WITH CO2 INSUFFLATION;  Surgeon: Jean Guo MD;  Location: [facility] OR     LAMINECT/DISCECTOMY, LUMBAR  3/19/2010    L4-5 herniated disc with severe left leg neruopathy      LAMINECTOMY THORACIC ONE LEVEL N/A 9/18/2019    Procedure: T9-10 THORASIC LAMINECTOMY WITH PRE-PROCEDURE NEURO MONITORING;  Surgeon: Reed Munguia MD;  Location:  OR     SURGICAL HISTORY OF -   3/23/2010    removal of meningial tumor of thoracic spine         ALLERGIES:  PENICILLIN.      [id number] MEDICATIONS:    Medications Prior to Admission   Medication Sig Dispense Refill Last Dose     albuterol (PROAIR HFA/PROVENTIL HFA/VENTOLIN HFA) 108 (90 Base) MCG/ACT inhaler Inhale 2 puffs into the lungs every 6 hours as needed for shortness of breath / dyspnea or wheezing 1 Inhaler 10      amitriptyline (ELAVIL) 100 MG tablet TAKE 1 TABLET BY MOUTH EVERY DAY  AT BEDTIME 90 tablet 3 Taking     atorvastatin (LIPITOR) 20 MG tablet Take 1 tablet (20 mg) by mouth daily 90 tablet 1 Taking     BENEFIBER DRINK MIX OR POWD by mouth daily   Taking     citalopram (CELEXA) 20 MG tablet Take 1 tablet (20 mg) by mouth daily 90 tablet 1 Taking     doxycycline monohydrate (MONODOX) 100 MG capsule Take 100 mg by mouth 2 times daily        fluticasone-salmeterol (ADVAIR) 100-50 MCG/DOSE inhaler Inhale 1 puff into the lungs 2 times daily 1 Inhaler 3      gabapentin (NEURONTIN) 300 MG capsule Take 2 capsules (600 mg) by mouth 3 times daily 360 capsule 1 Taking     hydrochlorothiazide (HYDRODIURIL) 25 MG tablet TAKE 1 TABLET BY MOUTH EVERY MORNING. NO REFILLS UNTIL FOLLOW UP 90 tablet 1 Taking     metoprolol tartrate (LOPRESSOR) 100 MG tablet TAKE 1 TABLET(100 MG) BY MOUTH TWICE DAILY 180 tablet 2 Taking     naproxen (NAPROSYN) 500 MG tablet Take 500 mg by mouth 2 times daily (with meals)        tamsulosin (FLOMAX) 0.4 MG capsule Take 1 capsule (0.4 mg) by mouth daily 30 capsule 0 Taking        FAMILY HISTORY:  Reviewed and noncontributory.      SOCIAL HISTORY:  The patient reports rare alcohol use, denies any smoking history.  Again, he is wheelchair-bound and lives in a house [place] with his wife and son.      LABORATORY EVALUATION:  Preop labs showed creatinine 1.05, hemoglobin 16.3, platelets 374.      PHYSICAL EXAMINATION:   VITAL SIGNS:  Temperature 98.2, heart rate 85, blood pressure 112/64, respiratory rate 23, oxygen saturation is 95% on 2 liters.   GENERAL:  Alert and oriented gentleman sitting up in bed.  Appears comfortable and is appropriately conversant.  Again, declined communication through  at this time.  Answers questions appropriately and follows commands.   HEENT:  Pupils equal and reactive to light, EOMI.   ENT:  Mucous membranes are moist.   CARDIOVASCULAR:  Regular rate and rhythm, no murmurs appreciated.   RESPIRATORY:  Lungs are clear to auscultation  bilaterally, no increased work of breathing or wheezing.   GASTROINTESTINAL:  Positive bowel sounds.  Abdomen is soft and nontender to palpation.   SKIN:  Warm and dry.   MUSCULOSKELETAL:  Strength in bilateral upper extremities is +5/5 and equal bilaterally.  The patient able to wiggle toes on bilateral lower extremities very minimally, left greater than right.   NEUROLOGIC:  Cranial nerves II-XII are grossly intact.  Speech is clear.  Face symmetric.  He has numbness in bilateral lower extremities and significant weakness in bilateral lower extremities, he reports unchanged since prior to surgery.      ASSESSMENT AND PLAN:  Elaina Valenzuela is a very pleasant 58-year-old gentleman with a history of hypertension, mild intermittent asthma, neurogenic bladder, hyperlipidemia, history of spinal meningioma status post operative intervention, and paraplegia, who is postoperative day 0 status post T9 through T10 bilateral laminectomy for decompression of central stenosis.  Hospitalist Service was consulted for postoperative medical co-management.   1.  Postoperative day 0 status post T9 through T10 bilateral laminectomy for decompression of central stenosis.  The procedure was performed under general anesthesia with an estimated blood loss of 100 mL.  There were no intraoperative complications.  Per operative note, the patient has been experiencing progressive severe lower extremity weakness, resulting in near paralysis, with imaging showing severe cord compression at T9 through T10, prompting the above procedure.  Post-surgery, the patient's pain is well controlled, and he is hemodynamically stable.   -- Defer primary management postoperatively to Neurosurgery, including DVT prophylaxis and pain control.   -- Normal saline ordered at 75 mL per hour.  This should be discontinued when patient is tolerating adequate p.o. intake.   -- PT, OT.   -- Advance diet as tolerated.   -- Hemoglobin is ordered for the a.m.  We will  get a BMP as well before resuming diuretics.   3.  Hypertension.  Blood pressure is controlled post-procedure.   -- Continue zizwp-lz-mvyhslfrw metoprolol tartrate with hold parameters.   -- Will hold hydrochlorothiazide until off IV fluids.  Again we will ensure stable BMP in the morning.   4.  Mild persistent asthma.  Per preoperative H and P, patient using rescue inhaler very frequently and stopped using his Advair due to poor insurance coverage.  PCP recommended he resume this.  We will order this here, and he can take the inhaler home with him.  Currently without evidence of exacerbation of his asthma and denies any shortness of breath.   5.  Depression.  Continue hqpjh-ak-skgyisqdh Celexa and Elavil at bedtime.   6.  Hyperlipidemia.  Continue qlilj-za-akbhqffoz atorvastatin.      Hospitalist Service will continue to follow along.  We appreciate the consultation.      The patient was seen and examined with Dr. Kleber Noe, who agrees with the above plan.         KLEBER NOE MD       As dictated by MYRNA DE ANDA PA-C            D: 2019   T: 2019   MT: NAYANA      Name:     MAXINE TY   MRN:      [id number]        Account:       [id number]   :      1960           Consult Date:  2019      Document: [id number]

## 2019-09-19 NOTE — PLAN OF CARE
Discharge Planner OT   Patient plan for discharge: Unknown.  Current status: Order rec'd. Patient has been evaluated by PT who reports patient wc bound at baseline, pivoting to toilet and shower chair, gets A with dressing/bathing. Today, he needed MAX A of 2 to attempt to get to EOB but unable.  Barriers to return to prior living situation: Defer to PT.  Recommendations for discharge: Defer to PT   Rationale for recommendations: OT deferring eval to TCU at this time as PT is addressing patient's needs here in the hospital. Is currently unable to tolerate much activity, including ADL. Order completed.       Entered by: Chantal Zimmer 09/19/2019 2:20 PM

## 2019-09-19 NOTE — PLAN OF CARE
"PT:  Patient seen for initial evaluation and treatment.  Elaina Valenzuela is a very pleasant 58-year-old gentleman with a past medical history significant for hypertension, mild intermittent asthma, neurogenic bladder, hyperlipidemia, history of spinal meningioma, status post L4 through L5 fusion and T8 through T9 laminectomy for resection of intradural meningioma, and paraplegia, who is postoperative day 1 status post T9 through T10 bilateral laminectomy with decompression of central stenosis. Becky  present throughout session.  Patient reports he lives with wife and son who assist as able with transfers.  Patient states he has been wc bound for 2 years; he cannot stand; he just swings himself over to wc/commode/shower chair.  He reports he has fallen more times in the last six months than he can count.  Does have 8 steps and he slides on his bottom with his son moving his legs to each subsequent step as patient has not had function of L/E's'. Unable to determine how patient gets up and down from the floor.  Discharge Planner PT   Patient plan for discharge: \"Rehab\"  Current status: Patient instructed in the role of PT in POC and results of eval.  Significant time spent discussing patient's home setting and how he has been functioning in addidion to his goals.  Rating pain at 8/10 but agreeable to participate.  Increased time to lower bed gradually to more flat position.  Rolled to right with Max A of 2 including using full draw sheet to pull patient into right sidelying.  Right sidelying to sitting with Max A of 2 to come to 75% of sitting.  Unable to transfer into full sitting due to difficulty and pain.  Returned to right sidelying and supine with Max A of 2.  Dependent to scoot higher in bed.  BP at end of session 111/64.  Positioned comfortably with pillows and resting with eyes closed by end of session.   present throughout session.   Barriers to return to prior living situation: current " "level of function and level of assistance required, fall risk, steps, alone for part of day, fatigue, pain  Recommendations for discharge: TCU  Rationale for recommendations: Patient will benefit from continued PT intervention to increase strength and ROM and progress functional mobility.  Patient reports goals of \"taking a few steps\".         Entered by: Debbie Mohan 09/19/2019 2:51 PM      "

## 2019-09-19 NOTE — PLAN OF CARE
Pt here for T9-10 bilateral lami for decompression w/ Nilda. POD1. A&Ox4. Assist of 2 w/ lift. Has only had ice chips. + BS. [place]. Neuros intact with weakness in lower extremities. CMS intact in upper extremities, baseline numbness in lower extremities, is unable to feel soft touch. VSS, on 2 L of O2. NS running at 75 ml. Paulson patent and secured, with adequate output. Dressing has dried blood on it, Covering entire dressing. Repo q2h. Can slightly wiggle toes.  scheduled for 8 am- noon on 9/19/19, Bilingual, primary language [place]. Speaks english and can understand simple english. C/o of back pain, switched to oxycodone q3h and Roboxin q6h. Pt scoring green on aggression stop light tool. Plan: waiting for PT/OT to evaluate.

## 2019-09-19 NOTE — PLAN OF CARE
A&O. Pt [place]. VSS ex BP soft. Metoprolol held as pt did not meet parameters this morning. Weaned from 2L O2, on RA. Up with A2 w/ lift. Pt WC bound at baseline. Turned in bed q2hrs. Pt unable to tolerate much activity today. Pain managed with Tylenol, oxycodone, gabapentin and robaxin. CMS/Neuros intact ex significant weakness in lower extremities from hips down, unable to dorsi/plantar flex/ wiggle toes, pt reports no sensation BLE. Dressing with drainage, reinforced. BS active, +flatus, diet advanced to regular. Paulson intact with adequate output. IV SL.

## 2019-09-19 NOTE — PHARMACY-ADMISSION MEDICATION HISTORY
Admission medication history interview status for the 9/18/2019  admission is complete. See EPIC admission navigator for prior to admission medications     Medication history source reliability:Good    Actions taken by pharmacist (provider contacted, etc): Med rec completed before surgery in chart and medication refill records.     Additional medication history information not noted on PTA med list :None    Medication reconciliation/reorder completed by provider prior to medication history? Yes    Time spent in this activity: 30 miutes    Prior to Admission medications    Medication Sig Last Dose Taking? Auth Provider   albuterol (PROAIR HFA/PROVENTIL HFA/VENTOLIN HFA) 108 (90 Base) MCG/ACT inhaler Inhale 2 puffs into the lungs every 6 hours as needed for shortness of breath / dyspnea or wheezing  Yes Rosas Flores MD   amitriptyline (ELAVIL) 100 MG tablet TAKE 1 TABLET BY MOUTH EVERY DAY AT BEDTIME  Yes Rosas Flores MD   atorvastatin (LIPITOR) 20 MG tablet Take 1 tablet (20 mg) by mouth daily  Yes Rosas Flores MD   BENEFIBER DRINK MIX OR POWD by mouth daily  Yes Reported, Patient   citalopram (CELEXA) 20 MG tablet Take 1 tablet (20 mg) by mouth daily  Yes Rosas Flores MD   doxycycline monohydrate (MONODOX) 100 MG capsule Take 100 mg by mouth 2 times daily  Yes Unknown, Entered By History   fluticasone-salmeterol (ADVAIR) 100-50 MCG/DOSE inhaler Inhale 1 puff into the lungs 2 times daily  Yes Rosas Flores MD   gabapentin (NEURONTIN) 300 MG capsule Take 2 capsules (600 mg) by mouth 3 times daily  Yes Rosas Flores MD   hydrochlorothiazide (HYDRODIURIL) 25 MG tablet TAKE 1 TABLET BY MOUTH EVERY MORNING. NO REFILLS UNTIL FOLLOW UP  Yes Rosas Flores MD   metoprolol tartrate (LOPRESSOR) 100 MG tablet TAKE 1 TABLET(100 MG) BY MOUTH TWICE DAILY  Yes Rosas Flores  MD Eron   naproxen (NAPROSYN) 500 MG tablet Take 500 mg by mouth 2 times daily (with meals)  Yes Unknown, Entered By History   tamsulosin (FLOMAX) 0.4 MG capsule Take 1 capsule (0.4 mg) by mouth daily   Rosas Flores MD

## 2019-09-19 NOTE — PROGRESS NOTES
"   09/19/19 1421   Quick Adds   Type of Visit Initial PT Evaluation       Present yes   Language   ([place])   Living Environment   Lives With child(bailey), adult;spouse   Living Arrangements house   Home Accessibility not wheelchair accessible;stairs to enter home   Number of Stairs, Main Entrance 8   Stair Railings, Main Entrance railings safe and in good condition   Self-Care   Usual Activity Tolerance fair   Current Activity Tolerance poor   Equipment Currently Used at Home wheelchair, manual   Activity/Exercise/Self-Care Comment Per patient he has been receiving PT until late August but was not seeing improvement.  Has been mostly wc bound for two years.  States he is unable to stand.  Mostly slides from bed to wc to shower chair to commode.   Difficult to understand how much help he needs but it appears he often has his son physically help him though he is alone much of the day.  He reports he has fallen more times than he can count when asked how many times he has fallen in the last six months.   Functional Level Prior   Ambulation 4-->completely dependent   Transferring 3-->assistive equipment and person   Toileting 3-->assistive equipment and person   Bathing 3-->assistive equipment and person   Communication 0-->understands/communicates without difficulty   Swallowing 0-->swallows foods/liquids without difficulty   Cognition 0 - no cognition issues reported   Fall history within last six months yes   Number of times patient has fallen within last six months   (\"More times than I can count.\")   Which of the above functional risks had a recent onset or change? none   Prior Functional Level Comment Per patient, his son helps him up and down the steps as he scoots on his bottom and his son moves his legs to each step.  Patient reports he has been strong in his arms so that helps.    General Information   Onset of Illness/Injury or Date of Surgery - Date 09/18/19   Referring Physician Sriram, " "Leonardo BAILEY PA-C   Patient/Family Goals Statement Patient states his goal is \"to be able to walk a few steps\".   Pertinent History of Current Problem (include personal factors and/or comorbidities that impact the POC)  Elaina Valenzuela is a very pleasant 58-year-old gentleman with a past medical history significant for hypertension, mild intermittent asthma, neurogenic bladder, hyperlipidemia, history of spinal meningioma, status post L4 through L5 fusion and T8 through T9 laminectomy for resection of intradural meningioma, and paraplegia, who is postoperative day 1 status post T9 through T10 bilateral laminectomy with decompression of central stenosis.   Precautions/Limitations fall precautions;spinal precautions   General Observations Lying in bed with eyes closed; agreeable to paticipate.     General Info Comments Much of session spent obtaining PLOF   Cognitive Status Examination   Orientation orientation to person, place and time   Level of Consciousness alert  (but tired)   Follows Commands and Answers Questions 100% of the time   Personal Safety and Judgment intact   Memory intact   Pain Assessment   Patient Currently in Pain Yes, see Vital Sign flowsheet  (7/10)   Integumentary/Edema   Integumentary/Edema Comments mild edema noted in bilateral L/E's   Posture    Posture Comments Lying in bed; unable to come to full sitting posture   Range of Motion (ROM)   ROM Comment Bilateral dorsiflexion lacking 10 degrees from neutral.  Hip flexion to 90 degrees passively in supine; tight beyond that.  U/E right shoulder flexion to approximately 120 degrees and left to 160 degrees.  Right limited by increased pain in incision.    Strength   Strength Comments Trace movement in bilateral L/E's from ankle to hip movment on command.  Patient reports occasional spontaneous movement.    Bed Mobility   Bed Mobility Comments Supine to right sidelying with Max A of 2 including using full draw sheet to assist.  Attempted right " "sidelying to sitting with Max A of 2.  Able to come to 75% of sit but not further due to pain.  Returned to right sidelying and supine with Max A of 2.  Dependent to scoot up in bed.     Transfer Skills   Transfer Comments Deferred; unable   Gait   Gait Comments Deferred; unable   Balance   Balance Comments Max A of 2; unable to come to full sitting at EOB   Sensory Examination   Sensory Perception Comments Not noting touch in bilateral L/E's at this time.    Coordination   Coordination no deficits were identified   Muscle Tone   Muscle Tone no deficits were identified   General Therapy Interventions   Planned Therapy Interventions balance training;bed mobility training;ROM;strengthening;stretching;transfer training;progressive activity/exercise   Clinical Impression   Criteria for Skilled Therapeutic Intervention yes, treatment indicated   PT Diagnosis impaired functional mobility   Influenced by the following impairments weakness, deconditioning, paralysis, pain   Functional limitations due to impairments decreased independence in functional mobility   Clinical Presentation Evolving/Changing   Clinical Presentation Rationale per clinical judgment   Clinical Decision Making (Complexity) Low complexity   Therapy Frequency Daily   Predicted Duration of Therapy Intervention (days/wks) 3 days in hospital   Anticipated Equipment Needs at Discharge   (has multiple walkers and canes)   Anticipated Discharge Disposition Transitional Care Facility   Risk & Benefits of therapy have been explained Yes   Patient, Family & other staff in agreement with plan of care Yes   [facility] AM-PAC  \"6 Clicks\" V.2 Basic Mobility Inpatient Short Form   1. Turning from your back to your side while in a flat bed without using bedrails? 2 - A Lot   2. Moving from lying on your back to sitting on the side of a flat bed without using bedrails? 2 - A Lot   3. Moving to and from a bed to a chair (including a wheelchair)? 1 - Total   4. " Standing up from a chair using your arms (e.g., wheelchair, or bedside chair)? 1 - Total   5. To walk in hospital room? 1 - Total   6. Climbing 3-5 steps with a railing? 1 - Total   Basic Mobility Raw Score (Score out of 24.Lower scores equate to lower levels of function) 8   Total Evaluation Time   Total Evaluation Time (Minutes) 15

## 2019-09-19 NOTE — PROGRESS NOTES
[facility]    Hospitalist Progress Note    Date of Service (when I saw the patient): 09/19/2019    Assessment & Plan   Elaina Valenzuela is a 58 year old male who was admitted on 9/18/2019 for T9 through T10 bilateral laminectomy for decompression of central stenosis.    1. Laminectomy.  Post op cares per Neurosurgery.    2. HTN.  BP well controlled on metoprolol.  Hydrochlorothiazide is on hold.    3. Asthma.  Continue Breo Ellipta and prn albuterol.    4. HL.  Continue atorvastatin.    5. Depression.  Continue Celexa.    Disposition: Expected discharge per Neurosurgery.    DVT Prophylaxis: Pneumatic Compression Devices  Code Status: Full Code    Compa Dave Linda  Text Page (7 am to 6 pm)    Interval History   The patient is resting comfortably in bed.  He has no acute complaints.    -Data reviewed today: I reviewed all new labs and imaging results over the last 24 hours. I personally reviewed no images or EKG's today.    Physical Exam   Temp: 98.1  F (36.7  C) Temp src: Oral BP: 104/60 Pulse: 77 Heart Rate: 87 Resp: 18 SpO2: 92 % O2 Device: None (Room air) Oxygen Delivery: 2 LPM  There were no vitals filed for this visit.  Vital Signs with Ranges  Temp:  [97.7  F (36.5  C)-98.2  F (36.8  C)] 98.1  F (36.7  C)  Pulse:  [72-98] 77  Heart Rate:  [65-98] 87  Resp:  [10-23] 18  BP: ()/(60-79) 104/60  SpO2:  [91 %-99 %] 92 %  I/O last 3 completed shifts:  In: 1600 [I.V.:1600]  Out: 755 [Urine:655; Blood:100]    Gen: Well nourished, well developed, alert and oriented x 3, no acute distressed  HEENT: Atraumatic, normocephalic  Lungs: Clear to ausculation without wheezes, rhonchi, or rales  Heart: Regular rate and rhythm, no gallops or rubs, no murmurs  GI: Bowel sound normal, no hepatosplenomegaly or masses  Lymph: No lymphadenopathy or edema  Skin: No rashes     Medications     sodium chloride 75 mL/hr at 09/19/19 0600       acetaminophen  975 mg Oral Q8H     atorvastatin  20 mg Oral Daily      citalopram  20 mg Oral Daily     ranitidine  150 mg Oral Q12H    Or     famotidine  20 mg Intravenous Q12H     fluticasone-vilanterol  1 puff Inhalation Daily     gabapentin  600 mg Oral TID     metoprolol tartrate  100 mg Oral BID     senna-docusate  1 tablet Oral BID    Or     senna-docusate  2 tablet Oral BID     sodium chloride (PF)  3 mL Intracatheter Q8H     tamsulosin  0.4 mg Oral Daily       Data   Recent Labs   Lab 09/19/19  0907   HGB 11.6*      POTASSIUM 3.6   CHLORIDE 101   CO2 30   BUN 27   CR 0.92   ANIONGAP 6   ADA 8.0*   GLC 96       Recent Results (from the past 24 hour(s))   XR Surgery HANNA Fluoro L/T 5 Min w Stills    Narrative    SURGERY C-ARM FLUORO LESS THAN 5 MIN W STILLS 9/18/2019 3:50 PM     HISTORY: T9-T10 laminectomy, fluoro 7 sec, 3 images, OR 34, C-arm #1,  [id number], SB/AAG    NUMBER OF IMAGES ACQUIRED: 3    VIEWS: 2    FLUOROSCOPY TIME: 0.1 minutes.      Impression    IMPRESSION: A localization probe projects over the center of the  spinal canal at the T10-11 level on the frontal view assuming 12 rib  bearing vertebral bodies. On the lateral view, a localization probe is  seen projecting over the posterior aspect of the spinal canal at the  lower thoracic/upper lumbar level, but the exact level cannot be  determined.    SONIA BRANHAM MD

## 2019-09-20 ENCOUNTER — APPOINTMENT (OUTPATIENT)
Dept: GENERAL RADIOLOGY | Facility: CLINIC | Age: 59
DRG: 518 | End: 2019-09-20
Attending: HOSPITALIST
Payer: COMMERCIAL

## 2019-09-20 ENCOUNTER — APPOINTMENT (OUTPATIENT)
Dept: PHYSICAL THERAPY | Facility: CLINIC | Age: 59
DRG: 518 | End: 2019-09-20
Attending: NEUROLOGICAL SURGERY
Payer: COMMERCIAL

## 2019-09-20 LAB
ALBUMIN UR-MCNC: 30 MG/DL
APPEARANCE UR: CLEAR
BILIRUB UR QL STRIP: NEGATIVE
COLOR UR AUTO: ABNORMAL
ERYTHROCYTE [DISTWIDTH] IN BLOOD BY AUTOMATED COUNT: 13.4 % (ref 10–15)
GLUCOSE UR STRIP-MCNC: 30 MG/DL
HCT VFR BLD AUTO: 36.1 % (ref 40–53)
HGB BLD-MCNC: 11.5 G/DL (ref 13.3–17.7)
HGB BLD-MCNC: NORMAL G/DL (ref 13.3–17.7)
HGB UR QL STRIP: ABNORMAL
KETONES UR STRIP-MCNC: NEGATIVE MG/DL
LEUKOCYTE ESTERASE UR QL STRIP: ABNORMAL
MCH RBC QN AUTO: 27.8 PG (ref 26.5–33)
MCHC RBC AUTO-ENTMCNC: 31.9 G/DL (ref 31.5–36.5)
MCV RBC AUTO: 87 FL (ref 78–100)
MUCOUS THREADS #/AREA URNS LPF: PRESENT /LPF
NITRATE UR QL: NEGATIVE
PH UR STRIP: 6 PH (ref 5–7)
PLATELET # BLD AUTO: 260 10E9/L (ref 150–450)
PROCALCITONIN SERPL-MCNC: 0.08 NG/ML
RBC # BLD AUTO: 4.14 10E12/L (ref 4.4–5.9)
RBC #/AREA URNS AUTO: >182 /HPF (ref 0–2)
SOURCE: ABNORMAL
SP GR UR STRIP: 1.03 (ref 1–1.03)
UROBILINOGEN UR STRIP-MCNC: 4 MG/DL (ref 0–2)
WBC # BLD AUTO: 15.7 10E9/L (ref 4–11)
WBC #/AREA URNS AUTO: 12 /HPF (ref 0–5)

## 2019-09-20 PROCEDURE — 81001 URINALYSIS AUTO W/SCOPE: CPT | Performed by: HOSPITALIST

## 2019-09-20 PROCEDURE — 87040 BLOOD CULTURE FOR BACTERIA: CPT | Performed by: HOSPITALIST

## 2019-09-20 PROCEDURE — 36415 COLL VENOUS BLD VENIPUNCTURE: CPT | Performed by: HOSPITALIST

## 2019-09-20 PROCEDURE — 12000000 ZZH R&B MED SURG/OB

## 2019-09-20 PROCEDURE — 84145 PROCALCITONIN (PCT): CPT | Performed by: HOSPITALIST

## 2019-09-20 PROCEDURE — 25000128 H RX IP 250 OP 636: Performed by: INTERNAL MEDICINE

## 2019-09-20 PROCEDURE — 25000132 ZZH RX MED GY IP 250 OP 250 PS 637: Performed by: PHYSICIAN ASSISTANT

## 2019-09-20 PROCEDURE — 71045 X-RAY EXAM CHEST 1 VIEW: CPT

## 2019-09-20 PROCEDURE — 97530 THERAPEUTIC ACTIVITIES: CPT | Mod: GP

## 2019-09-20 PROCEDURE — 85027 COMPLETE CBC AUTOMATED: CPT | Performed by: HOSPITALIST

## 2019-09-20 PROCEDURE — 87086 URINE CULTURE/COLONY COUNT: CPT | Performed by: NEUROLOGICAL SURGERY

## 2019-09-20 PROCEDURE — 99233 SBSQ HOSP IP/OBS HIGH 50: CPT | Performed by: INTERNAL MEDICINE

## 2019-09-20 RX ORDER — AZITHROMYCIN 500 MG/1
500 INJECTION, POWDER, LYOPHILIZED, FOR SOLUTION INTRAVENOUS EVERY 24 HOURS
Status: COMPLETED | OUTPATIENT
Start: 2019-09-20 | End: 2019-09-20

## 2019-09-20 RX ORDER — CEFTRIAXONE 1 G/1
1 INJECTION, POWDER, FOR SOLUTION INTRAMUSCULAR; INTRAVENOUS EVERY 24 HOURS
Status: DISCONTINUED | OUTPATIENT
Start: 2019-09-20 | End: 2019-09-21

## 2019-09-20 RX ORDER — AZITHROMYCIN 250 MG/1
250 TABLET, FILM COATED ORAL DAILY
Status: DISCONTINUED | OUTPATIENT
Start: 2019-09-21 | End: 2019-09-21

## 2019-09-20 RX ADMIN — TAMSULOSIN HYDROCHLORIDE 0.4 MG: 0.4 CAPSULE ORAL at 09:17

## 2019-09-20 RX ADMIN — OXYCODONE HYDROCHLORIDE 10 MG: 5 TABLET ORAL at 21:13

## 2019-09-20 RX ADMIN — OXYCODONE HYDROCHLORIDE 10 MG: 5 TABLET ORAL at 07:34

## 2019-09-20 RX ADMIN — OXYCODONE HYDROCHLORIDE 10 MG: 5 TABLET ORAL at 14:31

## 2019-09-20 RX ADMIN — ACETAMINOPHEN 975 MG: 325 TABLET, FILM COATED ORAL at 21:13

## 2019-09-20 RX ADMIN — CEFTRIAXONE 1 G: 1 INJECTION, POWDER, FOR SOLUTION INTRAMUSCULAR; INTRAVENOUS at 11:26

## 2019-09-20 RX ADMIN — SENNOSIDES AND DOCUSATE SODIUM 1 TABLET: 8.6; 5 TABLET ORAL at 09:17

## 2019-09-20 RX ADMIN — ATORVASTATIN CALCIUM 20 MG: 20 TABLET, FILM COATED ORAL at 09:17

## 2019-09-20 RX ADMIN — SENNOSIDES AND DOCUSATE SODIUM 1 TABLET: 8.6; 5 TABLET ORAL at 21:14

## 2019-09-20 RX ADMIN — METOPROLOL TARTRATE 100 MG: 50 TABLET ORAL at 21:14

## 2019-09-20 RX ADMIN — GABAPENTIN 600 MG: 300 CAPSULE ORAL at 15:56

## 2019-09-20 RX ADMIN — HYDROXYZINE HYDROCHLORIDE 25 MG: 25 TABLET ORAL at 07:34

## 2019-09-20 RX ADMIN — RANITIDINE 150 MG: 150 TABLET ORAL at 09:16

## 2019-09-20 RX ADMIN — RANITIDINE 150 MG: 150 TABLET ORAL at 21:13

## 2019-09-20 RX ADMIN — GABAPENTIN 600 MG: 300 CAPSULE ORAL at 21:13

## 2019-09-20 RX ADMIN — ACETAMINOPHEN 975 MG: 325 TABLET, FILM COATED ORAL at 04:17

## 2019-09-20 RX ADMIN — AZITHROMYCIN MONOHYDRATE 500 MG: 500 INJECTION, POWDER, LYOPHILIZED, FOR SOLUTION INTRAVENOUS at 15:33

## 2019-09-20 RX ADMIN — METOPROLOL TARTRATE 100 MG: 50 TABLET ORAL at 09:16

## 2019-09-20 RX ADMIN — ACETAMINOPHEN 975 MG: 325 TABLET, FILM COATED ORAL at 14:05

## 2019-09-20 RX ADMIN — FLUTICASONE FUROATE AND VILANTEROL TRIFENATATE 1 PUFF: 100; 25 POWDER RESPIRATORY (INHALATION) at 11:27

## 2019-09-20 RX ADMIN — GABAPENTIN 600 MG: 300 CAPSULE ORAL at 09:17

## 2019-09-20 RX ADMIN — OXYCODONE HYDROCHLORIDE 10 MG: 5 TABLET ORAL at 11:31

## 2019-09-20 RX ADMIN — CITALOPRAM HYDROBROMIDE 20 MG: 20 TABLET ORAL at 09:17

## 2019-09-20 ASSESSMENT — ACTIVITIES OF DAILY LIVING (ADL)
ADLS_ACUITY_SCORE: 28

## 2019-09-20 NOTE — PROGRESS NOTES
[facility]    Hospitalist Progress Note    Date of Service (when I saw the patient): 09/20/2019    Assessment & Plan   Elaina Valenzuela is a 58 year old male who was admitted on 9/18/2019 for T9 through T10 bilateral laminectomy for decompression of central stenosis.    1. Laminectomy.  Post op cares per Neurosurgery.  PT/OT.  Suture/staple removal in 10-14 days.    2. HTN.  BP well controlled on metoprolol.  Hydrochlorothiazide is on hold.    3. Asthma.  Continue Breo Ellipta and prn albuterol.    4. HL.  Continue atorvastatin.    5. Depression.  Continue Celexa.    6. UTI, not present on admission.  Febrile last night and complaining of suprapubic pain.  Started on Rocephin.  Urine and blood cultures pending.  Remove Paulson when patient able to void on own.    7. LLL CAP, not present on admission.  Likely new since admission.  Start on Rocephin and azithromycin.    Disposition: Expected discharge per Neurosurgery.    DVT Prophylaxis: Pneumatic Compression Devices  Code Status: Full Code    Compa Mckeon  Text Page (7 am to 6 pm)    Interval History   The patient is resting in bed.  He complains of a slight cough and suprapubic pain.    -Data reviewed today: I reviewed all new labs and imaging results over the last 24 hours. I personally reviewed no images or EKG's today.    Physical Exam   Temp: 98.8  F (37.1  C) Temp src: Oral BP: 123/79 Pulse: 88 Heart Rate: 79 Resp: 18 SpO2: 92 % O2 Device: Nasal cannula Oxygen Delivery: 2 LPM  There were no vitals filed for this visit.  Vital Signs with Ranges  Temp:  [98.4  F (36.9  C)-101.1  F (38.4  C)] 98.8  F (37.1  C)  Pulse:  [88] 88  Heart Rate:  [75-90] 79  Resp:  [16-20] 18  BP: (111-123)/(64-79) 123/79  SpO2:  [85 %-93 %] 92 %  I/O last 3 completed shifts:  In: 820 [P.O.:820]  Out: 600 [Urine:600]    Gen: Well nourished, well developed, alert and oriented x 3, no acute distressed  HEENT: Atraumatic, normocephalic  Lungs: Clear to ausculation  without wheezes, rhonchi, or rales  Heart: Regular rate and rhythm, no gallops or rubs, no murmurs  GI: Bowel sound normal, no hepatosplenomegaly or masses, suprapubic tenderness w/o guarding or rebound.  Lymph: No lymphadenopathy or edema  Skin: No rashes     Medications     sodium chloride 75 mL/hr at 09/19/19 0600       acetaminophen  975 mg Oral Q8H     atorvastatin  20 mg Oral Daily     cefTRIAXone  1 g Intravenous Q24H     citalopram  20 mg Oral Daily     ranitidine  150 mg Oral Q12H    Or     famotidine  20 mg Intravenous Q12H     fluticasone-vilanterol  1 puff Inhalation Daily     gabapentin  600 mg Oral TID     metoprolol tartrate  100 mg Oral BID     senna-docusate  1 tablet Oral BID    Or     senna-docusate  2 tablet Oral BID     sodium chloride (PF)  3 mL Intracatheter Q8H     tamsulosin  0.4 mg Oral Daily       Data   Recent Labs   Lab 09/20/19  0453 09/19/19  0907   WBC 15.7*  --    HGB 11.5*  Canceled, Test credited 11.6*   MCV 87  --      --    NA  --  137   POTASSIUM  --  3.6   CHLORIDE  --  101   CO2  --  30   BUN  --  27   CR  --  0.92   ANIONGAP  --  6   ADA  --  8.0*   GLC  --  96       Recent Results (from the past 24 hour(s))   XR Chest Port 1 View    Narrative    XR CHEST PORT 1 VW   9/20/2019 4:59 AM     HISTORY: Fever, new hypoxia; assess for infiltrate.    COMPARISON: 1/19/2016.    FINDINGS: Semiupright portable chest. The heart size is normal. There  is a left basilar infiltrate. The right lung is clear. No  pneumothorax.      Impression    IMPRESSION: Left basilar atelectasis or pneumonia.    MARKUS LUCIO MD

## 2019-09-20 NOTE — PLAN OF CARE
"Discharge Planner PT   Patient plan for discharge: \"Rehab\"  Current status: Patient was received supine in bed and c/o feeling very sleeping and wanting to sleep. Attempted to have pt perform ankle pumps, noted trace strength in ankle muscles. Pt with difficulty staying on task and required verbal cues to keep eyes open.  Pt required max assist x 2 with rolling to Left x 3 with max verbal cues on sequencing and technique. Pt noted to be very rigid and resistive to mobility and required verbal cues for relaxation. Pt is educated on spinal precautions and the importance of initiating LE AROM and there ex. Pt is educated on ankle pumps, heel slides, quad sets and glut sets. Pt noted with eyes closed most of the times.  present during PT session.    Barriers to return to prior living situation: current level of function and level of assistance required, fall risk, steps, alone for part of day, fatigue, pain  Recommendations for discharge: TCU  Rationale for recommendations: Patient will benefit from continued PT intervention to increase strength and progress functional mobility.        Entered by: Josh Marrufo 09/20/2019 2:55 PM       "

## 2019-09-20 NOTE — PLAN OF CARE
A&Ox4. CMS: BLE numbness, 1/5 strength, and absent dorsi/plantar flexion but able to wiggle toes. VS: temp 101.1 and requiring 2L 02 otherwise VSS. Dressing CDI. BS active and passing flatus. No BM. Regular diet. Up with lift. Paulson patent. C/o back pain decreased with oxycodone, tylenol and IV dilaudid for breakthrough pain. On-call MD notified of temp, see provider notification. Discharge to TCU pending.

## 2019-09-20 NOTE — PROGRESS NOTES
Hospitalist cross cover    Paged regarding fever to 101.1, hypoxic starting this evening.  Will check CXR, CBC, blood cultures and UA.  Vitals otherwise stable, will hold on antibiotics pending work-up.    ADDENDUM:  Leukocytosis of 15.7, CXR read as left basilar infiltrate vs atelectasis, though minimal per my read.  Will check procalcitonin and start antibiotics if elevated.

## 2019-09-20 NOTE — PROGRESS NOTES
[facility]    Neurosurgery Progress Note    Date of Service (when I saw the patient): 09/20/2019     Assessment & Plan   Elaina Valenzuela is a 58 year old male who was admitted on 9/18/2019 with history of L4-5 fusion and T7-8 laminectomy for resection of meningioma.  Presented with a year of progressive severe weakness in the legs, ultimately resulting in near paralysis and wheelchair usage.  Imaging showed severe cord compression at T9-10, with marked ossification of the ligamentum flavum and facet hypertrophy. Subsequently underwent T9-10 bilateral laminectomy for decompression.     Active Problems:    S/P laminectomy    Assessment: s/p T9-10 laminectomy for decmpression    Plan:   PT/OT; will need extensive rehab  Pain control  Encourage use of IS and deep breathing   Suture/Staple removal in 10-14 days        I have discussed the following assessment and plan with Dr. Munguia who is in agreement with initial plan and will follow up with further consultation recommendations.      Juliana Cardenas PA-C  [facility]  [address] [address]    Tel [phone]  Pager [phone]      Interval History   Stable    Physical Exam   Temp: 98.8  F (37.1  C) Temp src: Oral BP: 123/79 Pulse: 88 Heart Rate: 79 Resp: 18 SpO2: 92 % O2 Device: Nasal cannula Oxygen Delivery: 2 LPM  There were no vitals filed for this visit.  Vital Signs with Ranges  Temp:  [98.1  F (36.7  C)-101.1  F (38.4  C)] 98.8  F (37.1  C)  Pulse:  [88] 88  Heart Rate:  [75-90] 79  Resp:  [16-20] 18  BP: (104-123)/(60-79) 123/79  SpO2:  [85 %-93 %] 92 %  I/O last 3 completed shifts:  In: 820 [P.O.:820]  Out: 600 [Urine:600]    Heart Rate: 79, Blood pressure 123/79, pulse 88, temperature 98.8  F (37.1  C), temperature source Oral, resp. rate 18, SpO2 92 %.  0 lbs 0 oz  HEENT:  Normocephalic, atraumatic.  PERRLA.  EOM s intact.    Neck:  Supple, non-tender, without  lymphadenopathy.  Heart:  No peripheral edema  Lungs:  No SOB  Skin:  Warm and dry.  Extremities:  No edema, cyanosis or clubbing.    NEUROLOGICAL EXAMINATION:   Mental status:  Alert and Oriented x 3, speech is fluent.  Cranial nerves:  II-XII intact.   Motor:  Able to wiggle toes, trace movement with DF bilaterally  Sensation:  No sensation to BLE, minimal sensation to touch of right foot    Medications     sodium chloride 75 mL/hr at 09/19/19 0600       acetaminophen  975 mg Oral Q8H     atorvastatin  20 mg Oral Daily     cefTRIAXone  1 g Intravenous Q24H     citalopram  20 mg Oral Daily     ranitidine  150 mg Oral Q12H    Or     famotidine  20 mg Intravenous Q12H     fluticasone-vilanterol  1 puff Inhalation Daily     gabapentin  600 mg Oral TID     metoprolol tartrate  100 mg Oral BID     senna-docusate  1 tablet Oral BID    Or     senna-docusate  2 tablet Oral BID     sodium chloride (PF)  3 mL Intracatheter Q8H     tamsulosin  0.4 mg Oral Daily       Data   CBC RESULTS:   Recent Labs   Lab Test 09/20/19  0453   WBC 15.7*   RBC 4.14*   HGB 11.5*  Canceled, Test credited   HCT 36.1*   MCV 87   MCH 27.8   MCHC 31.9   RDW 13.4        Basic Metabolic Panel:  Lab Results   Component Value Date     09/19/2019      Lab Results   Component Value Date    POTASSIUM 3.6 09/19/2019     Lab Results   Component Value Date    CHLORIDE 101 09/19/2019     Lab Results   Component Value Date    ADA 8.0 09/19/2019     Lab Results   Component Value Date    CO2 30 09/19/2019     Lab Results   Component Value Date    BUN 27 09/19/2019     Lab Results   Component Value Date    CR 0.92 09/19/2019     Lab Results   Component Value Date    GLC 96 09/19/2019     INR:  No results found for: INR

## 2019-09-20 NOTE — PROVIDER NOTIFICATION
MD Notification    Notified Person: MD    Notified Person Name:  Constantine Ramirez MD    Notification Date/Time: 9/20/2019     Notification Interaction: Text page    Purpose of Notification: Temp 101.1, warm to the touch, requiring 2L 02.    Orders Received: CXR, CBC, blood cultures and UA    Comments: Scheduled tylenol given. Will continue to monitor.

## 2019-09-20 NOTE — PLAN OF CARE
POD 2 T9-10 Fusion.  CMS stable.  Baseline wheelchair bound.  Bilateral LEs weak, slight contraction, able to slightly move his right toes.  Back dressing CDI.  Alert, sleeping most of 1500-present, wakes easily.  Incisional pain managed with oxycodone. Paulson present.  Continues on antibiotics for UTI.  Temp 100.3.  Bedrest.  Plan for discharge to TCU when ready.

## 2019-09-20 NOTE — PROVIDER NOTIFICATION
MD Notification     Notified Person: MD     Notified Person Name:  Constantine Ramirez MD     Notification Date/Time: 9/20/2019 0559     Notification Interaction: Text page     Purpose of Notification: Chest xray results available. UA pending. WBC 15.7     Orders Received: Procalcitonin add-on     Comments: Pending results.

## 2019-09-21 ENCOUNTER — APPOINTMENT (OUTPATIENT)
Dept: PHYSICAL THERAPY | Facility: CLINIC | Age: 59
DRG: 518 | End: 2019-09-21
Attending: NEUROLOGICAL SURGERY
Payer: COMMERCIAL

## 2019-09-21 LAB
ANION GAP SERPL CALCULATED.3IONS-SCNC: 1 MMOL/L (ref 3–14)
BACTERIA SPEC CULT: NO GROWTH
BUN SERPL-MCNC: 16 MG/DL (ref 7–30)
CALCIUM SERPL-MCNC: 8 MG/DL (ref 8.5–10.1)
CHLORIDE SERPL-SCNC: 101 MMOL/L (ref 94–109)
CO2 SERPL-SCNC: 33 MMOL/L (ref 20–32)
CREAT SERPL-MCNC: 0.94 MG/DL (ref 0.66–1.25)
ERYTHROCYTE [DISTWIDTH] IN BLOOD BY AUTOMATED COUNT: 13.4 % (ref 10–15)
GFR SERPL CREATININE-BSD FRML MDRD: >90 ML/MIN/{1.73_M2}
GLUCOSE SERPL-MCNC: 133 MG/DL (ref 70–99)
HCT VFR BLD AUTO: 35.9 % (ref 40–53)
HGB BLD-MCNC: 11.3 G/DL (ref 13.3–17.7)
Lab: NORMAL
MCH RBC QN AUTO: 27.6 PG (ref 26.5–33)
MCHC RBC AUTO-ENTMCNC: 31.5 G/DL (ref 31.5–36.5)
MCV RBC AUTO: 88 FL (ref 78–100)
PLATELET # BLD AUTO: 265 10E9/L (ref 150–450)
POTASSIUM SERPL-SCNC: 3.6 MMOL/L (ref 3.4–5.3)
RBC # BLD AUTO: 4.09 10E12/L (ref 4.4–5.9)
SODIUM SERPL-SCNC: 135 MMOL/L (ref 133–144)
SPECIMEN SOURCE: NORMAL
WBC # BLD AUTO: 15 10E9/L (ref 4–11)

## 2019-09-21 PROCEDURE — 25000132 ZZH RX MED GY IP 250 OP 250 PS 637: Performed by: INTERNAL MEDICINE

## 2019-09-21 PROCEDURE — 80048 BASIC METABOLIC PNL TOTAL CA: CPT | Performed by: INTERNAL MEDICINE

## 2019-09-21 PROCEDURE — 12000000 ZZH R&B MED SURG/OB

## 2019-09-21 PROCEDURE — 25000132 ZZH RX MED GY IP 250 OP 250 PS 637: Performed by: PHYSICIAN ASSISTANT

## 2019-09-21 PROCEDURE — 99232 SBSQ HOSP IP/OBS MODERATE 35: CPT | Performed by: HOSPITALIST

## 2019-09-21 PROCEDURE — 36415 COLL VENOUS BLD VENIPUNCTURE: CPT | Performed by: INTERNAL MEDICINE

## 2019-09-21 PROCEDURE — 97530 THERAPEUTIC ACTIVITIES: CPT | Mod: GP

## 2019-09-21 PROCEDURE — 25000132 ZZH RX MED GY IP 250 OP 250 PS 637: Performed by: HOSPITALIST

## 2019-09-21 PROCEDURE — 85027 COMPLETE CBC AUTOMATED: CPT | Performed by: INTERNAL MEDICINE

## 2019-09-21 RX ORDER — LEVOFLOXACIN 250 MG/1
500 TABLET, FILM COATED ORAL DAILY
Status: DISCONTINUED | OUTPATIENT
Start: 2019-09-21 | End: 2019-09-25 | Stop reason: HOSPADM

## 2019-09-21 RX ADMIN — OXYCODONE HYDROCHLORIDE 10 MG: 5 TABLET ORAL at 12:44

## 2019-09-21 RX ADMIN — GABAPENTIN 600 MG: 300 CAPSULE ORAL at 09:41

## 2019-09-21 RX ADMIN — GABAPENTIN 600 MG: 300 CAPSULE ORAL at 16:08

## 2019-09-21 RX ADMIN — METHOCARBAMOL TABLETS 750 MG: 750 TABLET, COATED ORAL at 20:13

## 2019-09-21 RX ADMIN — OXYCODONE HYDROCHLORIDE 10 MG: 5 TABLET ORAL at 16:08

## 2019-09-21 RX ADMIN — ATORVASTATIN CALCIUM 20 MG: 20 TABLET, FILM COATED ORAL at 09:41

## 2019-09-21 RX ADMIN — METHOCARBAMOL TABLETS 750 MG: 750 TABLET, COATED ORAL at 14:12

## 2019-09-21 RX ADMIN — OXYCODONE HYDROCHLORIDE 10 MG: 5 TABLET ORAL at 22:57

## 2019-09-21 RX ADMIN — RANITIDINE 150 MG: 150 TABLET ORAL at 20:13

## 2019-09-21 RX ADMIN — FLUTICASONE FUROATE AND VILANTEROL TRIFENATATE 1 PUFF: 100; 25 POWDER RESPIRATORY (INHALATION) at 09:41

## 2019-09-21 RX ADMIN — METHOCARBAMOL TABLETS 750 MG: 750 TABLET, COATED ORAL at 06:20

## 2019-09-21 RX ADMIN — ACETAMINOPHEN 975 MG: 325 TABLET, FILM COATED ORAL at 04:43

## 2019-09-21 RX ADMIN — CITALOPRAM HYDROBROMIDE 20 MG: 20 TABLET ORAL at 09:41

## 2019-09-21 RX ADMIN — TAMSULOSIN HYDROCHLORIDE 0.4 MG: 0.4 CAPSULE ORAL at 09:40

## 2019-09-21 RX ADMIN — OXYCODONE HYDROCHLORIDE 10 MG: 5 TABLET ORAL at 08:30

## 2019-09-21 RX ADMIN — METOPROLOL TARTRATE 100 MG: 50 TABLET ORAL at 20:13

## 2019-09-21 RX ADMIN — SENNOSIDES AND DOCUSATE SODIUM 2 TABLET: 8.6; 5 TABLET ORAL at 09:40

## 2019-09-21 RX ADMIN — SENNOSIDES AND DOCUSATE SODIUM 1 TABLET: 8.6; 5 TABLET ORAL at 20:13

## 2019-09-21 RX ADMIN — LEVOFLOXACIN 500 MG: 250 TABLET, FILM COATED ORAL at 12:44

## 2019-09-21 RX ADMIN — OXYCODONE HYDROCHLORIDE 10 MG: 5 TABLET ORAL at 19:13

## 2019-09-21 RX ADMIN — ACETAMINOPHEN 975 MG: 325 TABLET, FILM COATED ORAL at 12:44

## 2019-09-21 RX ADMIN — METOPROLOL TARTRATE 100 MG: 50 TABLET ORAL at 09:40

## 2019-09-21 RX ADMIN — RANITIDINE 150 MG: 150 TABLET ORAL at 09:41

## 2019-09-21 RX ADMIN — OXYCODONE HYDROCHLORIDE 10 MG: 5 TABLET ORAL at 04:43

## 2019-09-21 RX ADMIN — GABAPENTIN 600 MG: 300 CAPSULE ORAL at 20:13

## 2019-09-21 ASSESSMENT — MIFFLIN-ST. JEOR: SCORE: 1655.9

## 2019-09-21 ASSESSMENT — ACTIVITIES OF DAILY LIVING (ADL)
ADLS_ACUITY_SCORE: 28
ADLS_ACUITY_SCORE: 32
ADLS_ACUITY_SCORE: 30
ADLS_ACUITY_SCORE: 30

## 2019-09-21 NOTE — PROGRESS NOTES
Shift 8152-6799: VSS. CMS stable. A/O x 4. Lungs: clear throughout, RA. BS present, passing flatus, no BM.  Paulson in place. Activity: Up w/ 2; ceiling lift--pt is baseline wheelchair bound. Diet: Regular. Pain managed w/ PRN Oxycodone, scheduled Tylenol. Incision: CDI. Interpretor present at times today. Antibiotics started today for UTI.

## 2019-09-21 NOTE — PROGRESS NOTES
CM    I: SW consult for discharge planning acknowledged.  was scheduled for 1400 today.  was reported to arrive at 1410, however, is not present in room at this time. SW unable to complete initial assessment at this time.     P: Continue to follow and assist as needed.    GERALD Regan

## 2019-09-21 NOTE — PLAN OF CARE
POD 3 T 9-10. CMS/neuros - arouses to voice, eyes closed between cares, speaks laos/interpretor present at bedside this am from 9-1030 and 1400 - 1445; understands english, disoriented to time, hard of hearing in left ear; moving arms spontaneously, severe immobility in lower extremities/numbness present & tingling/toes dorsi flex, not able to dorsi/plantar flex feet/stiffness in lower extremities noted/tremor noted occasional in lower extremities.  Bowel sounds active/rounded abdomen/last bm unknown/senna dose increased.  Tolerating pills whole with water/no appetite/small bites/set up provided/encouragement needed.  VSS, temp max 99.3.  Encouraged IS use/deep breathing; lung sounds diminished/nonproductive cough intermittent/communicated that it hurts in back to cough/ titrated to room air.  Dressing posterior back/clean dry and intact. Paulson to gravity/darker daniella urine/lower output. Bedrest with 2 assist/ceiling lift utilized for repositioning.  C/o back pain/incision pain, tylenol/robaxin/oxycodone provided.  TCU recommended at time of discharge.

## 2019-09-21 NOTE — PROGRESS NOTES
[facility]  Hospitalist Progress Note        Luisito Littlejohn, DO  2019        Interval History:      Patient states he continues to have pain but doing better.          Assessment and Plan:        Elaina Valenzuela is a 58 year old male who was admitted on 2019 for T9 through T10 bilateral laminectomy for decompression of central stenosis.     Laminectomy.  Post op cares per Neurosurgery.  PT/OT.       HTN.  BP controlled on metoprolol.  Hydrochlorothiazide is on hold.     Asthma.  Continue Breo Ellipta and prn albuterol.     HL.  Continue atorvastatin.     Depression.  Continue Celexa.     UTI, not present on admission. Started on Rocephin.  Urine and blood cultures pending. Transitioned to Levaquin.      LLL CAP, not present on admission. Levaquin.      DVT Prophylaxis: Defer to Surgery team.     Code: Full Code    Disposition: Expected discharge per Neurosurgery.                   Physical Exam:      Heart Rate: 77    Blood pressure 128/78, pulse 88, temperature 98  F (36.7  C), temperature source Oral, resp. rate 18, SpO2 93 %.    There were no vitals filed for this visit.    Vital Sign Ranges  Temperature Temp  Av  F (37.2  C)  Min: 98  F (36.7  C)  Max: 100.3  F (37.9  C)   Blood pressure Systolic (24hrs), Av , Min:117 , Max:136        Diastolic (24hrs), Av, Min:65, Max:82      Pulse No data recorded   Respirations Resp  Av  Min: 18  Max: 18   Pulse oximetry SpO2  Av.4 %  Min: 92 %  Max: 95 %     Vital Signs with Ranges  Temp:  [98  F (36.7  C)-100.3  F (37.9  C)] 98  F (36.7  C)  Heart Rate:  [77-90] 77  Resp:  [18] 18  BP: (117-136)/(65-82) 128/78  SpO2:  [92 %-95 %] 93 %    I/O Last 3 Shifts:   I/O last 3 completed shifts:  In: 391 [P.O.:290; I.V.:101]  Out: 615 [Urine:615]    I/O past 24 hours:     Intake/Output Summary (Last 24 hours) at 2019 0850  Last data filed at 2019 2200  Gross per 24 hour   Intake 391 ml   Output 375 ml   Net 16 ml      GENERAL: Alert and oriented. NAD. Conversational, appropriate.   HEENT: Normocephalic. EOMI. No icterus or injection. Nares normal.   LUNGS: Clear to auscultation. No dyspnea at rest.   HEART: Regular rate. Extremities perfused.   ABDOMEN: Soft, nontender, and nondistended. Positive bowel sounds.   EXTREMITIES: No LE edema noted.   NEUROLOGIC: Moves extremities x4 on command.         Prior to Admission Medications:        Medications Prior to Admission   Medication Sig Dispense Refill Last Dose     albuterol (PROAIR HFA/PROVENTIL HFA/VENTOLIN HFA) 108 (90 Base) MCG/ACT inhaler Inhale 2 puffs into the lungs every 6 hours as needed for shortness of breath / dyspnea or wheezing 1 Inhaler 10      amitriptyline (ELAVIL) 100 MG tablet TAKE 1 TABLET BY MOUTH EVERY DAY AT BEDTIME 90 tablet 3 Taking     atorvastatin (LIPITOR) 20 MG tablet Take 1 tablet (20 mg) by mouth daily 90 tablet 1 Taking     BENEFIBER DRINK MIX OR POWD by mouth daily   Taking     citalopram (CELEXA) 20 MG tablet Take 1 tablet (20 mg) by mouth daily 90 tablet 1 Taking     [] doxycycline monohydrate (MONODOX) 100 MG capsule Take 100 mg by mouth 2 times daily        fluticasone-salmeterol (ADVAIR) 100-50 MCG/DOSE inhaler Inhale 1 puff into the lungs 2 times daily 1 Inhaler 3      gabapentin (NEURONTIN) 300 MG capsule Take 2 capsules (600 mg) by mouth 3 times daily 360 capsule 1 Taking     hydrochlorothiazide (HYDRODIURIL) 25 MG tablet TAKE 1 TABLET BY MOUTH EVERY MORNING. NO REFILLS UNTIL FOLLOW UP 90 tablet 1 Taking     metoprolol tartrate (LOPRESSOR) 100 MG tablet TAKE 1 TABLET(100 MG) BY MOUTH TWICE DAILY 180 tablet 2 Taking     naproxen (NAPROSYN) 500 MG tablet Take 500 mg by mouth 2 times daily (with meals)        tamsulosin (FLOMAX) 0.4 MG capsule Take 1 capsule (0.4 mg) by mouth daily 30 capsule 0 Taking            Medications:        Current Facility-Administered Medications   Medication Last Rate     sodium chloride 75 mL/hr at  09/19/19 0600     Current Facility-Administered Medications   Medication Dose Route Frequency     acetaminophen  975 mg Oral Q8H     atorvastatin  20 mg Oral Daily     azithromycin  250 mg Oral Daily     cefTRIAXone  1 g Intravenous Q24H     citalopram  20 mg Oral Daily     ranitidine  150 mg Oral Q12H    Or     famotidine  20 mg Intravenous Q12H     fluticasone-vilanterol  1 puff Inhalation Daily     gabapentin  600 mg Oral TID     metoprolol tartrate  100 mg Oral BID     senna-docusate  1 tablet Oral BID    Or     senna-docusate  2 tablet Oral BID     sodium chloride (PF)  3 mL Intracatheter Q8H     tamsulosin  0.4 mg Oral Daily     Current Facility-Administered Medications   Medication Dose Route Frequency     acetaminophen  650 mg Oral Q4H PRN     albuterol  2 puff Inhalation Q6H PRN     amitriptyline  100 mg Oral At Bedtime PRN     sore throat lozenge  1 lozenge Buccal Q1H PRN     calcium carbonate  1,000 mg Oral 4x Daily PRN     HYDROmorphone  0.3-0.5 mg Intravenous Q1H PRN     hydrOXYzine  25 mg Oral Q6H PRN     lidocaine 4%   Topical Q1H PRN     lidocaine (buffered or not buffered)  1 mL Other Q1H PRN     methocarbamol  750 mg Oral Q6H PRN     metoclopramide  10 mg Oral Q6H PRN    Or     metoclopramide  10 mg Intravenous Q6H PRN     naloxone  0.1-0.4 mg Intravenous Q2 Min PRN     ondansetron  4 mg Oral Q6H PRN    Or     ondansetron  4 mg Intravenous Q6H PRN     oxyCODONE  5-10 mg Oral Q3H PRN     prochlorperazine  10 mg Intravenous Q6H PRN    Or     prochlorperazine  10 mg Oral Q6H PRN     sodium chloride (PF)  3 mL Intracatheter Q1H PRN            Data:      Lab data reviewed.   Recent Labs   Lab 09/20/19  0453 09/19/19  0907   HGB 11.5*  Canceled, Test credited 11.6*   MCV 87  --      --    NA  --  137   POTASSIUM  --  3.6   CHLORIDE  --  101   CO2  --  30   BUN  --  27   CR  --  0.92   ANIONGAP  --  6   ADA  --  8.0*   GLC  --  96           Imaging:      Imaging data reviewed.     Dr. Jorge  ANALY Littlejohn.  [facility] Hospitalist  Pager [phone]

## 2019-09-21 NOTE — PLAN OF CARE
A/O x3, disoriented to time. AVSS on 2L of O2, T-max 100.1. Neuros intact. CMS intact ex BLE weakness, slight contraction, and numbness. C/o back incision pain, tylenol, oxycodone, and robaxin given, ice applied. Lower back dressing CDI. Paulson patent, dark daniella output. Turn/repo q2h, wheelchair bound at baseline. Will continue to monitor.

## 2019-09-21 NOTE — PLAN OF CARE
"Discharge Planner PT   Patient plan for discharge: not stated this session; previously \"rehab\"  Current status: Patient was received R sidelying in bed and c/o severe back pain. Patient agreeable to session with encouragement. Patient supine to sit with maxAx2, HOB elevated, and max verbal cues for patient participation. Required additional time for each step of log roll and transfer due to severe back pain; patient requesting to stop throughout but agreeable after resting. Patient seated EOB x5 min with maxA with occasional modA and significant verbal cues for upright posture, pull through bed rails, and push through UE to assist with maintaining position. Attempted to have pt perform ankle pumps, noted trace strength in ankle muscles.  Pt required max assist x 2 to return to supine, maxA for rolling L with verbal cues for patient participation. Patient required totalA for boosting in bed.  Pt noted to be very rigid and resistive to mobility; required verbal cues for relaxation. Pt is educated on spinal precautions and the importance of initiating LE AROM and there ex. Pt noted with eyes closed frequently during mobility.  present during PT session.  Patient L sidelying on PT exit with bed alarm on and needs within reach.  Barriers to return to prior living situation: current level of function and level of assistance required, fall risk, steps, alone for part of day, fatigue, pain  Recommendations for discharge: TCU  Rationale for recommendations: Patient will benefit from continued PT intervention to increase strength and progress functional mobility. Patient is currently below baseline and activity tolerance is severely limited by back pain.       Entered by: Toya Kwon 09/21/2019 10:36 AM       "

## 2019-09-21 NOTE — PLAN OF CARE
Patient alert, [place].  VSS on room air, lungs diminished, encouraged IS use.  Bilateral LE numbness/tingling.  Contracted legs, wheelchair bound at baseline.  Unable to dorsiflex.  Dressing C/D/I.  Pain rated 8/10 oxycodone given with some relief.  Fair appetite.  Paulson patent with dark daniella urine output.

## 2019-09-21 NOTE — PROGRESS NOTES
[facility]    Neurosurgery  Daily Post-Op Note    Assessment & Plan   Procedure(s):  T9-10 THORASIC LAMINECTOMY WITH PRE-PROCEDURE NEURO MONITORING   -3 Days Post-Op  Doing well.  Tolerating physical therapy and rehabilitation well.  Strength is slightly improved in right foot.   C/o back/incisional pain.     Plan:  -Advance activity as tolerated  -Continue supportive and symptomatic treatment  -will need TCU for disposition      Leonardo Bhatia    Interval History   Stable.  Doing well.      Physical Exam   Temp: 98  F (36.7  C) Temp src: Oral BP: 128/78   Heart Rate: 77 Resp: 18 SpO2: 93 % O2 Device: None (Room air) Oxygen Delivery: 2 LPM  There were no vitals filed for this visit.  Vital Signs with Ranges  Temp:  [98  F (36.7  C)-100.3  F (37.9  C)] 98  F (36.7  C)  Heart Rate:  [77-90] 77  Resp:  [18] 18  BP: (117-136)/(65-82) 128/78  SpO2:  [92 %-95 %] 93 %  I/O last 3 completed shifts:  In: 391 [P.O.:290; I.V.:101]  Out: 615 [Urine:615]    Alert and oriented.  3/5 right EHL, otherwise no movement in LE    Incision: CDI      Medications        acetaminophen  975 mg Oral Q8H     atorvastatin  20 mg Oral Daily     citalopram  20 mg Oral Daily     ranitidine  150 mg Oral Q12H    Or     famotidine  20 mg Intravenous Q12H     fluticasone-vilanterol  1 puff Inhalation Daily     gabapentin  600 mg Oral TID     levofloxacin  500 mg Oral Daily     metoprolol tartrate  100 mg Oral BID     senna-docusate  1 tablet Oral BID    Or     senna-docusate  2 tablet Oral BID     sodium chloride (PF)  3 mL Intravenous Q8H     sodium chloride (PF)  3 mL Intracatheter Q8H     tamsulosin  0.4 mg Oral Daily       Data         Leonardo Bhatia PAARRON  [facility]

## 2019-09-22 ENCOUNTER — APPOINTMENT (OUTPATIENT)
Dept: PHYSICAL THERAPY | Facility: CLINIC | Age: 59
DRG: 518 | End: 2019-09-22
Attending: NEUROLOGICAL SURGERY
Payer: COMMERCIAL

## 2019-09-22 LAB
ERYTHROCYTE [DISTWIDTH] IN BLOOD BY AUTOMATED COUNT: 13.7 % (ref 10–15)
HCT VFR BLD AUTO: 37 % (ref 40–53)
HGB BLD-MCNC: 11.5 G/DL (ref 13.3–17.7)
MCH RBC QN AUTO: 27.6 PG (ref 26.5–33)
MCHC RBC AUTO-ENTMCNC: 31.1 G/DL (ref 31.5–36.5)
MCV RBC AUTO: 89 FL (ref 78–100)
PLATELET # BLD AUTO: 314 10E9/L (ref 150–450)
RBC # BLD AUTO: 4.17 10E12/L (ref 4.4–5.9)
WBC # BLD AUTO: 12.4 10E9/L (ref 4–11)

## 2019-09-22 PROCEDURE — 85027 COMPLETE CBC AUTOMATED: CPT | Performed by: HOSPITALIST

## 2019-09-22 PROCEDURE — 25000128 H RX IP 250 OP 636: Performed by: PHYSICIAN ASSISTANT

## 2019-09-22 PROCEDURE — 25000128 H RX IP 250 OP 636: Performed by: HOSPITALIST

## 2019-09-22 PROCEDURE — 25000132 ZZH RX MED GY IP 250 OP 250 PS 637: Performed by: PHYSICIAN ASSISTANT

## 2019-09-22 PROCEDURE — 99232 SBSQ HOSP IP/OBS MODERATE 35: CPT | Performed by: HOSPITALIST

## 2019-09-22 PROCEDURE — 25000132 ZZH RX MED GY IP 250 OP 250 PS 637: Performed by: HOSPITALIST

## 2019-09-22 PROCEDURE — 12000000 ZZH R&B MED SURG/OB

## 2019-09-22 PROCEDURE — 36415 COLL VENOUS BLD VENIPUNCTURE: CPT | Performed by: HOSPITALIST

## 2019-09-22 PROCEDURE — 97530 THERAPEUTIC ACTIVITIES: CPT | Mod: GP

## 2019-09-22 RX ORDER — BISACODYL 10 MG
10 SUPPOSITORY, RECTAL RECTAL DAILY PRN
Status: DISCONTINUED | OUTPATIENT
Start: 2019-09-22 | End: 2019-09-25 | Stop reason: HOSPADM

## 2019-09-22 RX ORDER — AMOXICILLIN 250 MG
1 CAPSULE ORAL 2 TIMES DAILY PRN
Status: DISCONTINUED | OUTPATIENT
Start: 2019-09-22 | End: 2019-09-25 | Stop reason: HOSPADM

## 2019-09-22 RX ORDER — POLYETHYLENE GLYCOL 3350 17 G/17G
17 POWDER, FOR SOLUTION ORAL DAILY PRN
Status: DISCONTINUED | OUTPATIENT
Start: 2019-09-22 | End: 2019-09-25 | Stop reason: HOSPADM

## 2019-09-22 RX ORDER — AMOXICILLIN 250 MG
2 CAPSULE ORAL 2 TIMES DAILY PRN
Status: DISCONTINUED | OUTPATIENT
Start: 2019-09-22 | End: 2019-09-25 | Stop reason: HOSPADM

## 2019-09-22 RX ORDER — HYDROMORPHONE HYDROCHLORIDE 1 MG/ML
0.3 INJECTION, SOLUTION INTRAMUSCULAR; INTRAVENOUS; SUBCUTANEOUS ONCE
Status: COMPLETED | OUTPATIENT
Start: 2019-09-22 | End: 2019-09-22

## 2019-09-22 RX ADMIN — METOPROLOL TARTRATE 100 MG: 50 TABLET ORAL at 20:00

## 2019-09-22 RX ADMIN — ACETAMINOPHEN 650 MG: 325 TABLET, FILM COATED ORAL at 20:00

## 2019-09-22 RX ADMIN — SENNOSIDES AND DOCUSATE SODIUM 2 TABLET: 8.6; 5 TABLET ORAL at 22:25

## 2019-09-22 RX ADMIN — HYDROMORPHONE HYDROCHLORIDE 0.3 MG: 1 INJECTION, SOLUTION INTRAMUSCULAR; INTRAVENOUS; SUBCUTANEOUS at 22:25

## 2019-09-22 RX ADMIN — METHOCARBAMOL TABLETS 750 MG: 750 TABLET, COATED ORAL at 11:37

## 2019-09-22 RX ADMIN — ACETAMINOPHEN 650 MG: 325 TABLET, FILM COATED ORAL at 06:29

## 2019-09-22 RX ADMIN — RANITIDINE 150 MG: 150 TABLET ORAL at 09:29

## 2019-09-22 RX ADMIN — METHOCARBAMOL TABLETS 750 MG: 750 TABLET, COATED ORAL at 23:14

## 2019-09-22 RX ADMIN — TAMSULOSIN HYDROCHLORIDE 0.4 MG: 0.4 CAPSULE ORAL at 09:27

## 2019-09-22 RX ADMIN — CITALOPRAM HYDROBROMIDE 20 MG: 20 TABLET ORAL at 09:28

## 2019-09-22 RX ADMIN — GABAPENTIN 600 MG: 300 CAPSULE ORAL at 22:25

## 2019-09-22 RX ADMIN — LEVOFLOXACIN 500 MG: 250 TABLET, FILM COATED ORAL at 09:28

## 2019-09-22 RX ADMIN — FLUTICASONE FUROATE AND VILANTEROL TRIFENATATE 1 PUFF: 100; 25 POWDER RESPIRATORY (INHALATION) at 09:29

## 2019-09-22 RX ADMIN — OXYCODONE HYDROCHLORIDE 10 MG: 5 TABLET ORAL at 06:28

## 2019-09-22 RX ADMIN — RANITIDINE 150 MG: 150 TABLET ORAL at 20:00

## 2019-09-22 RX ADMIN — ACETAMINOPHEN 650 MG: 325 TABLET, FILM COATED ORAL at 15:56

## 2019-09-22 RX ADMIN — ATORVASTATIN CALCIUM 20 MG: 20 TABLET, FILM COATED ORAL at 09:29

## 2019-09-22 RX ADMIN — GABAPENTIN 600 MG: 300 CAPSULE ORAL at 09:28

## 2019-09-22 RX ADMIN — OXYCODONE HYDROCHLORIDE 10 MG: 5 TABLET ORAL at 23:14

## 2019-09-22 RX ADMIN — METOPROLOL TARTRATE 100 MG: 50 TABLET ORAL at 09:28

## 2019-09-22 RX ADMIN — OXYCODONE HYDROCHLORIDE 10 MG: 5 TABLET ORAL at 20:00

## 2019-09-22 RX ADMIN — SENNOSIDES AND DOCUSATE SODIUM 2 TABLET: 8.6; 5 TABLET ORAL at 09:27

## 2019-09-22 RX ADMIN — METHOCARBAMOL TABLETS 750 MG: 750 TABLET, COATED ORAL at 05:20

## 2019-09-22 RX ADMIN — ACETAMINOPHEN 650 MG: 325 TABLET, FILM COATED ORAL at 11:37

## 2019-09-22 RX ADMIN — METHOCARBAMOL TABLETS 750 MG: 750 TABLET, COATED ORAL at 17:36

## 2019-09-22 RX ADMIN — OXYCODONE HYDROCHLORIDE 10 MG: 5 TABLET ORAL at 03:14

## 2019-09-22 RX ADMIN — OXYCODONE HYDROCHLORIDE 10 MG: 5 TABLET ORAL at 15:56

## 2019-09-22 RX ADMIN — OXYCODONE HYDROCHLORIDE 10 MG: 5 TABLET ORAL at 09:50

## 2019-09-22 RX ADMIN — HYDROMORPHONE HYDROCHLORIDE 0.3 MG: 1 INJECTION, SOLUTION INTRAMUSCULAR; INTRAVENOUS; SUBCUTANEOUS at 13:57

## 2019-09-22 RX ADMIN — GABAPENTIN 600 MG: 300 CAPSULE ORAL at 15:56

## 2019-09-22 ASSESSMENT — ACTIVITIES OF DAILY LIVING (ADL)
ADLS_ACUITY_SCORE: 30
ADLS_ACUITY_SCORE: 31
ADLS_ACUITY_SCORE: 30
ADLS_ACUITY_SCORE: 31

## 2019-09-22 NOTE — PLAN OF CARE
POD 4 T 9-10. A&O. CMS - numbness/tingling from waist down to toes/legs severely immobile/legs stiff & contracted/no dorsi plantar flexion noted/ toes point up.   Hard of hearing.  Bowel sounds hypoactive/abdomen rounded/nontender/no bm since surgery/flatus positive/senna increased/bowel protocol orders obtained. Tolerating clear liquids/no appetite/set up needed. VSS, kaylan sounds diminished/titrated to RA/IS encouraged. Dressing changed/moist serousanginous drainage/sterry strips saturated so replaced with new/edema noted around incision line - nontender. Bedrest with 2 assist/ceiling lift assisted.  C/o back pain, decreased with dilaudid iv one time/robaxin/oxycodone/tylenol. [place]son patent/output recorded/darker daniella urine/adequate amount. Becky interpretor present 9-11, 12-2, 2-3:30.  Turned & repositioned every 2 hours or as needed.

## 2019-09-22 NOTE — CONSULTS
Care Transition Initial Assessment - SW     Met with: Patient (and )  Active Problems:    S/P laminectomy       DATA  Lives With: spouse   Living Arrangements: house     Description of Support System: Supportive, Involved  Who is your support system?: Wife  Support Assessment: Adequate family and caregiver support, Adequate social supports.   Identified issues/concerns regarding health management:   ASSESSMENT  Cognitive Status: Awake, alert, oriented.  Concerns to be addressed: Discharge planning    SW reviewed chart and met with patient (and ). Pt was admitted 9/18/19 with Thoracic Myelopathy s/p laminectomy. Anticipated discharge date: 9/23/19. SW introduced self and role. Pt states he resides in his [place] home with his spouse. Pt is w/c bound at baseline, reporting his spouse helps him with ADL's if she is home but can manage independently while she is at work. Pt receives no outside services at this time. We reviewed therapy recommendations for: TCU. Pt requests referrals be sent to: [facility], [facility] and [facility] in [place], which was done thru [facility].  [facility] lists [facility] as his coverage, however, the copy of his card on file does not indicate a particular plan. Card also reveals patient may have a $300 OPP for first 5 days of placement. (unknown if this pertains to both hospital and TCU) SW is not totally sure the facilities listed above take his [facility]. Will await call backs from facilities. Pt agrees to pay costs for HE w/c transport, if [facility] does not cover this. Will continue to follow.       PLAN  Financial costs for the patient includes: Possible OOP costs with are.  Patient given options and choices for discharge: Yes .  Patient/family is agreeable to the plan?  Yes  Transportation/person available to transport on day of discharge  is family vs HE w/c  Patient Goals and Preferences: Discharge to TCU .  Patient anticipates  discharging to:  TCU.    Continue to assist to ensure a safe discharge.    GERALD Regan

## 2019-09-22 NOTE — PROGRESS NOTES
[facility]    Neurosurgery  Daily Post-Op Note    Assessment & Plan   Procedure(s):  T9-10 THORASIC LAMINECTOMY WITH PRE-PROCEDURE NEURO MONITORING   -4 Days Post-Op  Tolerating physical therapy and rehabilitation well.  He does c/o fairly constant back pain, making mobilizing a bit more difficult. No increased sensation/motion in LE.   Plan:  -Continue supportive and symptomatic treatment  -Start or continue physical therapy  -TCU when bed is available.       Leonardo Bhatia    Interval History   Stable.  Having some issues with pain control today.     Physical Exam   Temp: 99.3  F (37.4  C) Temp src: Oral BP: (!) 152/97 Pulse: 84 Heart Rate: 84 Resp: 16 SpO2: 95 % O2 Device: None (Room air) Oxygen Delivery: 2 LPM  Vitals:    09/21/19 1228   Weight: 88.3 kg (194 lb 11.2 oz)     Vital Signs with Ranges  Temp:  [97.6  F (36.4  C)-99.3  F (37.4  C)] 99.3  F (37.4  C)  Pulse:  [84] 84  Heart Rate:  [68-84] 84  Resp:  [14-18] 16  BP: (101-152)/(59-97) 152/97  SpO2:  [86 %-96 %] 95 %  I/O last 3 completed shifts:  In: 390 [P.O.:390]  Out: 1025 [Urine:1025]    Alert and oriented.        Incision:   CDI    Medications        atorvastatin  20 mg Oral Daily     citalopram  20 mg Oral Daily     ranitidine  150 mg Oral Q12H    Or     famotidine  20 mg Intravenous Q12H     fluticasone-vilanterol  1 puff Inhalation Daily     gabapentin  600 mg Oral TID     HYDROmorphone  0.3 mg Intravenous Once     levofloxacin  500 mg Oral Daily     metoprolol tartrate  100 mg Oral BID     senna-docusate  1 tablet Oral BID    Or     senna-docusate  2 tablet Oral BID     sodium chloride (PF)  3 mL Intravenous Q8H     sodium chloride (PF)  3 mL Intracatheter Q8H     tamsulosin  0.4 mg Oral Daily       Data         Leonardo Bhatia PAViktorC  [facility]

## 2019-09-22 NOTE — PLAN OF CARE
Discharge Planner PT   Patient plan for discharge: not stated this session  Current status: Patient was received supine in bed and agreeable to session but c/o severe back pain. Patient rolled supine to R sidelying with maxAx2 and verbal cues for UE reaching, log roll technique. Patient supine to sit with maxAx2, HOB elevated, and max verbal cues for patient participation. Required additional time for each step of log roll and transfer due to severe back pain; patient requesting to stop throughout but agreeable after resting. Patient seated EOB x7 min with maxAx2 progressing to modAx2 with verbal cues for upright posture, UE support on bed, and fwd gaze. Pt sit to supine requiring max assist x 2, maxA for rolling L with verbal cues for patient participation. Patient required totalA for boosting in bed.  Pt noted to be very rigid and resistive to mobility; required verbal cues for relaxation. SpO2 maintaining >92% on RA during session. Pt education on spinal precautions and continued progress toward decreased level of assist. Pt noted with eyes closed frequently during mobility.  present during PT session.  Patient supine on PT exit with bed alarm on, needs within reach, RN notified of status, and provider in room.  Barriers to return to prior living situation: current level of function and level of assistance required, fall risk, steps, alone for part of day, fatigue, pain  Recommendations for discharge: TCU  Rationale for recommendations: Patient will benefit from continued skilled IP PT intervention during stay to increase strength and progress functional mobility. Patient will require TCU stay to return to baseline level of independence including Ax1 for bed mobility and transfers to/from wheelchair. Patient is currently below baseline and activity tolerance is severely limited by back pain.       Entered by: Toya Kwon 09/22/2019 10:33 AM

## 2019-09-22 NOTE — PROGRESS NOTES
"[facility]  Hospitalist Progress Note        Luisito Littlejohn, DO  2019        Interval History:      Patient reports he is doing ok, however, struggling with pain control today per nursing staff.          Assessment and Plan:        Elaina Valenzuela is a 58 year old male who was admitted on 2019 for T9 through T10 bilateral laminectomy for decompression of central stenosis.     Laminectomy.   - Post op cares per Neurosurgery.    - PT/OT.       HTN.  BP controlled on metoprolol.    - Hydrochlorothiazide is on hold.     Asthma.    - Continue Breo Ellipta and prn albuterol.     HL.    - Continue atorvastatin.     Depression.    - Continue Celexa.     UTI, not present on admission.    - Urine and blood cultures pending.  - Levaquin.      LLL CAP, not present on admission.   - Levaquin.      DVT Prophylaxis: Defer to Surgery team.      Code: Full Code     Disposition: Expected discharge per Neurosurgery.                   Physical Exam:      Heart Rate: 83    Blood pressure 122/79, pulse 84, temperature 98.1  F (36.7  C), temperature source Oral, resp. rate 16, height 1.676 m (5' 6\"), weight 88.3 kg (194 lb 11.2 oz), SpO2 94 %.    Vitals:    19 1228   Weight: 88.3 kg (194 lb 11.2 oz)       Vital Sign Ranges  Temperature Temp  Av.9  F (36.6  C)  Min: 97.6  F (36.4  C)  Max: 98.2  F (36.8  C)   Blood pressure Systolic (24hrs), Av , Min:101 , Max:125        Diastolic (24hrs), Av, Min:59, Max:79      Pulse Pulse  Av  Min: 84  Max: 84   Respirations Resp  Avg: 15.1  Min: 14  Max: 18   Pulse oximetry SpO2  Av %  Min: 86 %  Max: 94 %     Vital Signs with Ranges  Temp:  [97.6  F (36.4  C)-98.2  F (36.8  C)] 98.1  F (36.7  C)  Pulse:  [84] 84  Heart Rate:  [68-83] 83  Resp:  [14-18] 16  BP: (101-125)/(59-79) 122/79  SpO2:  [86 %-94 %] 94 %    I/O Last 3 Shifts:   I/O last 3 completed shifts:  In: 390 [P.O.:390]  Out: 1025 [Urine:1025]    I/O past 24 hours: "     Intake/Output Summary (Last 24 hours) at 2019 0930  Last data filed at 2019 0700  Gross per 24 hour   Intake 465 ml   Output 675 ml   Net -210 ml     GENERAL: Alert and oriented. NAD. Conversational, appropriate. Pleasant.   HEENT: Normocephalic. EOMI. No icterus or injection. Nares normal.   LUNGS: Clear to auscultation. No dyspnea at rest.   HEART: Regular rate. Extremities perfused.   ABDOMEN: Soft, nontender, and nondistended. Positive bowel sounds.   EXTREMITIES: No LE edema noted.   NEUROLOGIC: Moves extremities x4 on command.         Prior to Admission Medications:        Medications Prior to Admission   Medication Sig Dispense Refill Last Dose     albuterol (PROAIR HFA/PROVENTIL HFA/VENTOLIN HFA) 108 (90 Base) MCG/ACT inhaler Inhale 2 puffs into the lungs every 6 hours as needed for shortness of breath / dyspnea or wheezing 1 Inhaler 10      amitriptyline (ELAVIL) 100 MG tablet TAKE 1 TABLET BY MOUTH EVERY DAY AT BEDTIME 90 tablet 3 Taking     atorvastatin (LIPITOR) 20 MG tablet Take 1 tablet (20 mg) by mouth daily 90 tablet 1 Taking     BENEFIBER DRINK MIX OR POWD by mouth daily   Taking     citalopram (CELEXA) 20 MG tablet Take 1 tablet (20 mg) by mouth daily 90 tablet 1 Taking     [] doxycycline monohydrate (MONODOX) 100 MG capsule Take 100 mg by mouth 2 times daily        fluticasone-salmeterol (ADVAIR) 100-50 MCG/DOSE inhaler Inhale 1 puff into the lungs 2 times daily 1 Inhaler 3      gabapentin (NEURONTIN) 300 MG capsule Take 2 capsules (600 mg) by mouth 3 times daily 360 capsule 1 Taking     hydrochlorothiazide (HYDRODIURIL) 25 MG tablet TAKE 1 TABLET BY MOUTH EVERY MORNING. NO REFILLS UNTIL FOLLOW UP 90 tablet 1 Taking     metoprolol tartrate (LOPRESSOR) 100 MG tablet TAKE 1 TABLET(100 MG) BY MOUTH TWICE DAILY 180 tablet 2 Taking     naproxen (NAPROSYN) 500 MG tablet Take 500 mg by mouth 2 times daily (with meals)        tamsulosin (FLOMAX) 0.4 MG capsule Take 1 capsule (0.4 mg)  by mouth daily 30 capsule 0 Taking            Medications:        Current Facility-Administered Medications   Medication Last Rate     Current Facility-Administered Medications   Medication Dose Route Frequency     atorvastatin  20 mg Oral Daily     citalopram  20 mg Oral Daily     ranitidine  150 mg Oral Q12H    Or     famotidine  20 mg Intravenous Q12H     fluticasone-vilanterol  1 puff Inhalation Daily     gabapentin  600 mg Oral TID     levofloxacin  500 mg Oral Daily     metoprolol tartrate  100 mg Oral BID     senna-docusate  1 tablet Oral BID    Or     senna-docusate  2 tablet Oral BID     sodium chloride (PF)  3 mL Intravenous Q8H     sodium chloride (PF)  3 mL Intracatheter Q8H     tamsulosin  0.4 mg Oral Daily     Current Facility-Administered Medications   Medication Dose Route Frequency     acetaminophen  650 mg Oral Q4H PRN     albuterol  2 puff Inhalation Q6H PRN     amitriptyline  100 mg Oral At Bedtime PRN     sore throat lozenge  1 lozenge Buccal Q1H PRN     calcium carbonate  1,000 mg Oral 4x Daily PRN     HYDROmorphone  0.3-0.5 mg Intravenous Q1H PRN     hydrOXYzine  25 mg Oral Q6H PRN     lidocaine 4%   Topical Q1H PRN     lidocaine (buffered or not buffered)  1 mL Other Q1H PRN     methocarbamol  750 mg Oral Q6H PRN     metoclopramide  10 mg Oral Q6H PRN    Or     metoclopramide  10 mg Intravenous Q6H PRN     naloxone  0.1-0.4 mg Intravenous Q2 Min PRN     ondansetron  4 mg Oral Q6H PRN    Or     ondansetron  4 mg Intravenous Q6H PRN     oxyCODONE  5-10 mg Oral Q3H PRN     prochlorperazine  10 mg Intravenous Q6H PRN    Or     prochlorperazine  10 mg Oral Q6H PRN     sodium chloride (PF)  3 mL Intracatheter Q1H PRN            Data:      Lab data reviewed.   Recent Labs   Lab 09/22/19  0807 09/21/19  0917 09/20/19  0453 09/19/19  0907   HGB 11.5* 11.3* 11.5*  Canceled, Test credited 11.6*   MCV 89 88 87  --     265 260  --    NA  --  135  --  137   POTASSIUM  --  3.6  --  3.6   CHLORIDE   --  101  --  101   CO2  --  33*  --  30   BUN  --  16  --  27   CR  --  0.94  --  0.92   ANIONGAP  --  1*  --  6   ADA  --  8.0*  --  8.0*   GLC  --  133*  --  96           Imaging:      Imaging data reviewed.     Dr. Luisito Littlejohn D.O.  [facility]  Pager [phone]

## 2019-09-23 LAB
ANION GAP SERPL CALCULATED.3IONS-SCNC: 4 MMOL/L (ref 3–14)
BUN SERPL-MCNC: 10 MG/DL (ref 7–30)
CALCIUM SERPL-MCNC: 8.3 MG/DL (ref 8.5–10.1)
CHLORIDE SERPL-SCNC: 98 MMOL/L (ref 94–109)
CO2 SERPL-SCNC: 30 MMOL/L (ref 20–32)
COPATH REPORT: NORMAL
CREAT SERPL-MCNC: 0.78 MG/DL (ref 0.66–1.25)
ERYTHROCYTE [DISTWIDTH] IN BLOOD BY AUTOMATED COUNT: 13.6 % (ref 10–15)
GFR SERPL CREATININE-BSD FRML MDRD: >90 ML/MIN/{1.73_M2}
GLUCOSE SERPL-MCNC: 113 MG/DL (ref 70–99)
HCT VFR BLD AUTO: 34.2 % (ref 40–53)
HGB BLD-MCNC: 10.9 G/DL (ref 13.3–17.7)
MCH RBC QN AUTO: 27.5 PG (ref 26.5–33)
MCHC RBC AUTO-ENTMCNC: 31.9 G/DL (ref 31.5–36.5)
MCV RBC AUTO: 86 FL (ref 78–100)
PLATELET # BLD AUTO: 345 10E9/L (ref 150–450)
POTASSIUM SERPL-SCNC: 3.8 MMOL/L (ref 3.4–5.3)
RBC # BLD AUTO: 3.97 10E12/L (ref 4.4–5.9)
SODIUM SERPL-SCNC: 132 MMOL/L (ref 133–144)
WBC # BLD AUTO: 11.9 10E9/L (ref 4–11)

## 2019-09-23 PROCEDURE — 99232 SBSQ HOSP IP/OBS MODERATE 35: CPT | Performed by: HOSPITALIST

## 2019-09-23 PROCEDURE — 25000132 ZZH RX MED GY IP 250 OP 250 PS 637: Performed by: NEUROLOGICAL SURGERY

## 2019-09-23 PROCEDURE — 25000125 ZZHC RX 250: Performed by: PHYSICIAN ASSISTANT

## 2019-09-23 PROCEDURE — 12000000 ZZH R&B MED SURG/OB

## 2019-09-23 PROCEDURE — 80048 BASIC METABOLIC PNL TOTAL CA: CPT | Performed by: HOSPITALIST

## 2019-09-23 PROCEDURE — 85027 COMPLETE CBC AUTOMATED: CPT | Performed by: HOSPITALIST

## 2019-09-23 PROCEDURE — 99207 ZZC CDG-MDM COMPONENT: MEETS MODERATE - UP CODED: CPT | Performed by: HOSPITALIST

## 2019-09-23 PROCEDURE — 25000132 ZZH RX MED GY IP 250 OP 250 PS 637: Performed by: HOSPITALIST

## 2019-09-23 PROCEDURE — 25000128 H RX IP 250 OP 636: Performed by: PHYSICIAN ASSISTANT

## 2019-09-23 PROCEDURE — 36415 COLL VENOUS BLD VENIPUNCTURE: CPT | Performed by: HOSPITALIST

## 2019-09-23 PROCEDURE — 25000132 ZZH RX MED GY IP 250 OP 250 PS 637: Performed by: PHYSICIAN ASSISTANT

## 2019-09-23 RX ORDER — MORPHINE SULFATE 15 MG/1
15 TABLET, FILM COATED, EXTENDED RELEASE ORAL EVERY 12 HOURS SCHEDULED
Status: DISCONTINUED | OUTPATIENT
Start: 2019-09-23 | End: 2019-09-23

## 2019-09-23 RX ORDER — MORPHINE SULFATE 15 MG/1
15 TABLET, FILM COATED, EXTENDED RELEASE ORAL EVERY 12 HOURS SCHEDULED
Status: DISCONTINUED | OUTPATIENT
Start: 2019-09-23 | End: 2019-09-25 | Stop reason: HOSPADM

## 2019-09-23 RX ORDER — DIAPER,BRIEF,INFANT-TODD,DISP
EACH MISCELLANEOUS 2 TIMES DAILY PRN
Status: DISCONTINUED | OUTPATIENT
Start: 2019-09-23 | End: 2019-09-25 | Stop reason: HOSPADM

## 2019-09-23 RX ADMIN — GABAPENTIN 600 MG: 300 CAPSULE ORAL at 09:09

## 2019-09-23 RX ADMIN — METHOCARBAMOL TABLETS 750 MG: 750 TABLET, COATED ORAL at 14:05

## 2019-09-23 RX ADMIN — ACETAMINOPHEN 650 MG: 325 TABLET, FILM COATED ORAL at 20:36

## 2019-09-23 RX ADMIN — SENNOSIDES AND DOCUSATE SODIUM 2 TABLET: 8.6; 5 TABLET ORAL at 09:08

## 2019-09-23 RX ADMIN — OXYCODONE HYDROCHLORIDE 10 MG: 5 TABLET ORAL at 19:32

## 2019-09-23 RX ADMIN — ATORVASTATIN CALCIUM 20 MG: 20 TABLET, FILM COATED ORAL at 09:08

## 2019-09-23 RX ADMIN — METHOCARBAMOL TABLETS 750 MG: 750 TABLET, COATED ORAL at 05:22

## 2019-09-23 RX ADMIN — Medication 10 MG: at 11:14

## 2019-09-23 RX ADMIN — OXYCODONE HYDROCHLORIDE 10 MG: 5 TABLET ORAL at 22:33

## 2019-09-23 RX ADMIN — OXYCODONE HYDROCHLORIDE 10 MG: 5 TABLET ORAL at 02:11

## 2019-09-23 RX ADMIN — TAMSULOSIN HYDROCHLORIDE 0.4 MG: 0.4 CAPSULE ORAL at 09:09

## 2019-09-23 RX ADMIN — RANITIDINE 150 MG: 150 TABLET ORAL at 09:09

## 2019-09-23 RX ADMIN — OXYCODONE HYDROCHLORIDE 10 MG: 5 TABLET ORAL at 05:22

## 2019-09-23 RX ADMIN — ACETAMINOPHEN 650 MG: 325 TABLET, FILM COATED ORAL at 07:32

## 2019-09-23 RX ADMIN — ACETAMINOPHEN 650 MG: 325 TABLET, FILM COATED ORAL at 02:11

## 2019-09-23 RX ADMIN — SENNOSIDES AND DOCUSATE SODIUM 2 TABLET: 8.6; 5 TABLET ORAL at 20:30

## 2019-09-23 RX ADMIN — CITALOPRAM HYDROBROMIDE 20 MG: 20 TABLET ORAL at 09:11

## 2019-09-23 RX ADMIN — METOPROLOL TARTRATE 100 MG: 50 TABLET ORAL at 20:31

## 2019-09-23 RX ADMIN — LEVOFLOXACIN 500 MG: 250 TABLET, FILM COATED ORAL at 09:09

## 2019-09-23 RX ADMIN — OXYCODONE HYDROCHLORIDE 10 MG: 5 TABLET ORAL at 12:41

## 2019-09-23 RX ADMIN — GABAPENTIN 600 MG: 300 CAPSULE ORAL at 16:15

## 2019-09-23 RX ADMIN — OXYCODONE HYDROCHLORIDE 10 MG: 5 TABLET ORAL at 09:08

## 2019-09-23 RX ADMIN — MORPHINE SULFATE 15 MG: 15 TABLET, EXTENDED RELEASE ORAL at 16:15

## 2019-09-23 RX ADMIN — METOPROLOL TARTRATE 100 MG: 50 TABLET ORAL at 09:08

## 2019-09-23 RX ADMIN — HYDROMORPHONE HYDROCHLORIDE 0.5 MG: 1 INJECTION, SOLUTION INTRAMUSCULAR; INTRAVENOUS; SUBCUTANEOUS at 07:46

## 2019-09-23 RX ADMIN — FAMOTIDINE 20 MG: 10 INJECTION, SOLUTION INTRAVENOUS at 20:30

## 2019-09-23 RX ADMIN — METHOCARBAMOL TABLETS 750 MG: 750 TABLET, COATED ORAL at 20:36

## 2019-09-23 RX ADMIN — FLUTICASONE FUROATE AND VILANTEROL TRIFENATATE 1 PUFF: 100; 25 POWDER RESPIRATORY (INHALATION) at 09:08

## 2019-09-23 RX ADMIN — GABAPENTIN 600 MG: 300 CAPSULE ORAL at 22:33

## 2019-09-23 ASSESSMENT — ACTIVITIES OF DAILY LIVING (ADL)
ADLS_ACUITY_SCORE: 32
ADLS_ACUITY_SCORE: 30
ADLS_ACUITY_SCORE: 32
ADLS_ACUITY_SCORE: 32

## 2019-09-23 NOTE — PROGRESS NOTES
"[facility]  Hospitalist Progress Note        Luisito Littlejohn DO  2019        Interval History:      Patient reports pain control request, discussed antibiotic coverage and plan of care, verbalized understanding.          Assessment and Plan:        Elaina Valenzuela is a 58 year old male who was admitted on 2019 for T9 through T10 bilateral laminectomy for decompression of central stenosis.     Laminectomy. Meningioma.   - Post op cares per Neurosurgery.    - PT/OT.    - Pain control.      HTN.  BP controlled on metoprolol.    - Hydrochlorothiazide is on hold.     Asthma.    - Continue Breo Ellipta and prn albuterol.     HL.    - Continue atorvastatin.     Depression.    - Continue Celexa.     UTI, not present on admission.    - Urine and blood cultures pending.  - Levaquin.      LLL CAP, not present on admission.   - Levaquin.      DVT Prophylaxis: Defer to Surgery team.      Code: Full Code     Disposition: Expected discharge per Neurosurgery.                   Physical Exam:      Heart Rate: 80    Blood pressure 125/81, pulse 84, temperature 98.5  F (36.9  C), temperature source Oral, resp. rate 16, height 1.676 m (5' 6\"), weight 88.3 kg (194 lb 11.2 oz), SpO2 92 %.    Vitals:    19 1228   Weight: 88.3 kg (194 lb 11.2 oz)       Vital Sign Ranges  Temperature Temp  Av.6  F (37  C)  Min: 98.4  F (36.9  C)  Max: 99.3  F (37.4  C)   Blood pressure Systolic (24hrs), Av , Min:114 , Max:152        Diastolic (24hrs), Av, Min:76, Max:97      Pulse No data recorded   Respirations Resp  Avg: 15.8  Min: 14  Max: 16   Pulse oximetry SpO2  Av.7 %  Min: 88 %  Max: 97 %     Vital Signs with Ranges  Temp:  [98.4  F (36.9  C)-99.3  F (37.4  C)] 98.5  F (36.9  C)  Heart Rate:  [74-84] 80  Resp:  [14-16] 16  BP: (114-152)/(76-97) 125/81  SpO2:  [88 %-97 %] 92 %    I/O Last 3 Shifts:   I/O last 3 completed shifts:  In: 1155 [P.O.:1155]  Out: 950 [Urine:950]    I/O past 24 " hours:     Intake/Output Summary (Last 24 hours) at 2019 0855  Last data filed at 2019 0620  Gross per 24 hour   Intake 1080 ml   Output 950 ml   Net 130 ml     GENERAL: Alert. NAD. Conversational, appropriate.   HEENT: Normocephalic. EOMI. No icterus or injection. Nares normal.   LUNGS: Clear to auscultation. No dyspnea at rest.   HEART: Regular rate. Extremities perfused.   ABDOMEN: Soft, nontender, and nondistended. Positive bowel sounds.   EXTREMITIES: No LE edema noted. Dressing to back, c/d/i.   NEUROLOGIC: Moves extremities x4 on command.         Prior to Admission Medications:        Medications Prior to Admission   Medication Sig Dispense Refill Last Dose     albuterol (PROAIR HFA/PROVENTIL HFA/VENTOLIN HFA) 108 (90 Base) MCG/ACT inhaler Inhale 2 puffs into the lungs every 6 hours as needed for shortness of breath / dyspnea or wheezing 1 Inhaler 10      amitriptyline (ELAVIL) 100 MG tablet TAKE 1 TABLET BY MOUTH EVERY DAY AT BEDTIME 90 tablet 3 Taking     atorvastatin (LIPITOR) 20 MG tablet Take 1 tablet (20 mg) by mouth daily 90 tablet 1 Taking     BENEFIBER DRINK MIX OR POWD by mouth daily   Taking     citalopram (CELEXA) 20 MG tablet Take 1 tablet (20 mg) by mouth daily 90 tablet 1 Taking     [] doxycycline monohydrate (MONODOX) 100 MG capsule Take 100 mg by mouth 2 times daily        fluticasone-salmeterol (ADVAIR) 100-50 MCG/DOSE inhaler Inhale 1 puff into the lungs 2 times daily 1 Inhaler 3      gabapentin (NEURONTIN) 300 MG capsule Take 2 capsules (600 mg) by mouth 3 times daily 360 capsule 1 Taking     hydrochlorothiazide (HYDRODIURIL) 25 MG tablet TAKE 1 TABLET BY MOUTH EVERY MORNING. NO REFILLS UNTIL FOLLOW UP 90 tablet 1 Taking     metoprolol tartrate (LOPRESSOR) 100 MG tablet TAKE 1 TABLET(100 MG) BY MOUTH TWICE DAILY 180 tablet 2 Taking     naproxen (NAPROSYN) 500 MG tablet Take 500 mg by mouth 2 times daily (with meals)        tamsulosin (FLOMAX) 0.4 MG capsule Take 1 capsule  (0.4 mg) by mouth daily 30 capsule 0 Taking            Medications:        Current Facility-Administered Medications   Medication Last Rate     Current Facility-Administered Medications   Medication Dose Route Frequency     atorvastatin  20 mg Oral Daily     citalopram  20 mg Oral Daily     ranitidine  150 mg Oral Q12H    Or     famotidine  20 mg Intravenous Q12H     fluticasone-vilanterol  1 puff Inhalation Daily     gabapentin  600 mg Oral TID     levofloxacin  500 mg Oral Daily     metoprolol tartrate  100 mg Oral BID     senna-docusate  1 tablet Oral BID    Or     senna-docusate  2 tablet Oral BID     sodium chloride (PF)  3 mL Intravenous Q8H     tamsulosin  0.4 mg Oral Daily     Current Facility-Administered Medications   Medication Dose Route Frequency     acetaminophen  650 mg Oral Q4H PRN     albuterol  2 puff Inhalation Q6H PRN     amitriptyline  100 mg Oral At Bedtime PRN     sore throat lozenge  1 lozenge Buccal Q1H PRN     bisacodyl  10 mg Rectal Daily PRN     calcium carbonate  1,000 mg Oral 4x Daily PRN     HYDROmorphone  0.3-0.5 mg Intravenous Q1H PRN     hydrOXYzine  25 mg Oral Q6H PRN     lidocaine 4%   Topical Q1H PRN     lidocaine (buffered or not buffered)  1 mL Other Q1H PRN     methocarbamol  750 mg Oral Q6H PRN     metoclopramide  10 mg Oral Q6H PRN    Or     metoclopramide  10 mg Intravenous Q6H PRN     naloxone  0.1-0.4 mg Intravenous Q2 Min PRN     ondansetron  4 mg Oral Q6H PRN    Or     ondansetron  4 mg Intravenous Q6H PRN     oxyCODONE  5-10 mg Oral Q3H PRN     polyethylene glycol  17 g Oral Daily PRN     prochlorperazine  10 mg Intravenous Q6H PRN    Or     prochlorperazine  10 mg Oral Q6H PRN     senna-docusate  1 tablet Oral BID PRN    Or     senna-docusate  2 tablet Oral BID PRN     sodium chloride (PF)  3 mL Intracatheter Q1H PRN            Data:      Lab data reviewed.   Recent Labs   Lab 09/22/19  0807 09/21/19  0917 09/20/19  0453 09/19/19  0907   HGB 11.5* 11.3* 11.5*   Canceled, Test credited 11.6*   MCV 89 88 87  --     265 260  --    NA  --  135  --  137   POTASSIUM  --  3.6  --  3.6   CHLORIDE  --  101  --  101   CO2  --  33*  --  30   BUN  --  16  --  27   CR  --  0.94  --  0.92   ANIONGAP  --  1*  --  6   ADA  --  8.0*  --  8.0*   GLC  --  133*  --  96           Imaging:      Imaging data reviewed.     Dr. Luisito Littlejohn D.O.  [facility]  Pager [phone]

## 2019-09-23 NOTE — PROGRESS NOTES
At RN bedside report pt stated he was in 10/10 pain in mid back. Observed arching back and tensing muscles. Tried repositioning and administered tylenol with no relief. Spoke with pharmacist re IV dilaudid order. Pharmacist stated that indication for medication was adequate to administer for breakthrough pain. PRN IV dilaudid administered.

## 2019-09-23 NOTE — PLAN OF CARE
POD 4 from a thoracic lami. A&O. CMS with LE numbness, which pt states is improved, paraplegia/spasticity. Bowel sounds active, positve flatus, tolerating regular diet, but poor appetite. VSS. Dressing C/D/I. Bedrest, repo'd q2. C/o back pain, well controled most of shift with tylenol, oxy and robaxin, but up to 8/10 at 2220, but unsure why, decreased with 0.3 IV dilaudid. Pt scoring green on the Aggression Stop Light Tool. Plan TCU at discharge.

## 2019-09-23 NOTE — PROGRESS NOTES
SPIRITUAL HEALTH SERVICES Progress Note  [facility] [id number]    Pt was in the room alone. Pt mentioned that he was feeling a great back pain this morning and it was ok at the moment when nemo visited. [place] introduced the [facility] and explained what we would support for pt through spiritual and emotional needs.     Pt is a [employer] and goes to the temple sometimes, pt's parents are Buddhists too. Pt is aware of [facility], the team is available per request.       Trace Salazar  Chaplain Resident

## 2019-09-23 NOTE — PROGRESS NOTES
[facility]    Neurosurgery  Daily Post-Op Note    Assessment & Plan   Procedure(s):  T9-10 THORASIC LAMINECTOMY WITH PRE-PROCEDURE NEURO MONITORING   -5 Days Post-Op  Tolerating physical therapy and rehabilitation well.  His pain control has been up and down.  He was 10 out of 10 at this morning and then received some IV Dilaudid, and shortly after reported 0 out of 10 pain.  He is wiggling his toes in both feet    Plan:  -Advance activity as tolerated  -Continue supportive and symptomatic treatment  -Pain control measures  -We may need to consider a long-acting oral medication, as he will not be able to get any IV pain medicine at rehab.      Leonardo Bhatia    Interval History   Stable.  Doing well.  Improving slowly.   No fevers.     Physical Exam   Temp: 98.5  F (36.9  C) Temp src: Oral BP: 125/81   Heart Rate: 84 Resp: 16 SpO2: 92 % O2 Device: None (Room air) Oxygen Delivery: 2 LPM  Vitals:    09/21/19 1228   Weight: 88.3 kg (194 lb 11.2 oz)     Vital Signs with Ranges  Temp:  [98.4  F (36.9  C)-98.5  F (36.9  C)] 98.5  F (36.9  C)  Heart Rate:  [74-84] 84  Resp:  [14-16] 16  BP: (114-145)/(76-94) 125/81  SpO2:  [88 %-97 %] 92 %  I/O last 3 completed shifts:  In: 1155 [P.O.:1155]  Out: 950 [Urine:950]    Alert and oriented.  Slight increased motion in feet bilaterally. He wasn't moving his left foot at all over the weekend.     Incision: CDI      Medications        atorvastatin  20 mg Oral Daily     citalopram  20 mg Oral Daily     ranitidine  150 mg Oral Q12H    Or     famotidine  20 mg Intravenous Q12H     fluticasone-vilanterol  1 puff Inhalation Daily     gabapentin  600 mg Oral TID     levofloxacin  500 mg Oral Daily     metoprolol tartrate  100 mg Oral BID     senna-docusate  1 tablet Oral BID    Or     senna-docusate  2 tablet Oral BID     sodium chloride (PF)  3 mL Intravenous Q8H     tamsulosin  0.4 mg Oral Daily       Data         Leonardo Bhatia PA-C  [facility]

## 2019-09-23 NOTE — PLAN OF CARE
PT- Attempted to see pt, interpretor present. After multiple attempts pt stated that he's in too much pain at this time and declined any therapy.

## 2019-09-24 ENCOUNTER — APPOINTMENT (OUTPATIENT)
Dept: PHYSICAL THERAPY | Facility: CLINIC | Age: 59
DRG: 518 | End: 2019-09-24
Attending: NEUROLOGICAL SURGERY
Payer: COMMERCIAL

## 2019-09-24 LAB
ANION GAP SERPL CALCULATED.3IONS-SCNC: 2 MMOL/L (ref 3–14)
BUN SERPL-MCNC: 8 MG/DL (ref 7–30)
CALCIUM SERPL-MCNC: 8.7 MG/DL (ref 8.5–10.1)
CHLORIDE SERPL-SCNC: 102 MMOL/L (ref 94–109)
CO2 SERPL-SCNC: 32 MMOL/L (ref 20–32)
CREAT SERPL-MCNC: 0.85 MG/DL (ref 0.66–1.25)
ERYTHROCYTE [DISTWIDTH] IN BLOOD BY AUTOMATED COUNT: 14 % (ref 10–15)
GFR SERPL CREATININE-BSD FRML MDRD: >90 ML/MIN/{1.73_M2}
GLUCOSE SERPL-MCNC: 114 MG/DL (ref 70–99)
HCT VFR BLD AUTO: 35.5 % (ref 40–53)
HGB BLD-MCNC: 11.4 G/DL (ref 13.3–17.7)
LACTATE BLD-SCNC: 1.6 MMOL/L (ref 0.7–2)
MCH RBC QN AUTO: 27.5 PG (ref 26.5–33)
MCHC RBC AUTO-ENTMCNC: 32.1 G/DL (ref 31.5–36.5)
MCV RBC AUTO: 86 FL (ref 78–100)
PLATELET # BLD AUTO: 356 10E9/L (ref 150–450)
POTASSIUM SERPL-SCNC: 3.4 MMOL/L (ref 3.4–5.3)
RBC # BLD AUTO: 4.14 10E12/L (ref 4.4–5.9)
SODIUM SERPL-SCNC: 136 MMOL/L (ref 133–144)
WBC # BLD AUTO: 13.8 10E9/L (ref 4–11)

## 2019-09-24 PROCEDURE — 12000000 ZZH R&B MED SURG/OB

## 2019-09-24 PROCEDURE — 25000132 ZZH RX MED GY IP 250 OP 250 PS 637: Performed by: HOSPITALIST

## 2019-09-24 PROCEDURE — 85027 COMPLETE CBC AUTOMATED: CPT | Performed by: HOSPITALIST

## 2019-09-24 PROCEDURE — 80048 BASIC METABOLIC PNL TOTAL CA: CPT | Performed by: HOSPITALIST

## 2019-09-24 PROCEDURE — 25000128 H RX IP 250 OP 636: Performed by: HOSPITALIST

## 2019-09-24 PROCEDURE — 25000132 ZZH RX MED GY IP 250 OP 250 PS 637: Performed by: PHYSICIAN ASSISTANT

## 2019-09-24 PROCEDURE — 25000132 ZZH RX MED GY IP 250 OP 250 PS 637: Performed by: NEUROLOGICAL SURGERY

## 2019-09-24 PROCEDURE — 97530 THERAPEUTIC ACTIVITIES: CPT | Mod: GP | Performed by: PHYSICAL THERAPIST

## 2019-09-24 PROCEDURE — 83605 ASSAY OF LACTIC ACID: CPT | Performed by: NEUROLOGICAL SURGERY

## 2019-09-24 PROCEDURE — 36415 COLL VENOUS BLD VENIPUNCTURE: CPT | Performed by: HOSPITALIST

## 2019-09-24 PROCEDURE — 36415 COLL VENOUS BLD VENIPUNCTURE: CPT | Performed by: NEUROLOGICAL SURGERY

## 2019-09-24 PROCEDURE — 99232 SBSQ HOSP IP/OBS MODERATE 35: CPT | Performed by: HOSPITALIST

## 2019-09-24 PROCEDURE — 99207 ZZC CDG-MDM COMPONENT: MEETS MODERATE - UP CODED: CPT | Performed by: HOSPITALIST

## 2019-09-24 RX ADMIN — GABAPENTIN 600 MG: 300 CAPSULE ORAL at 15:10

## 2019-09-24 RX ADMIN — ACETAMINOPHEN 650 MG: 325 TABLET, FILM COATED ORAL at 16:40

## 2019-09-24 RX ADMIN — CITALOPRAM HYDROBROMIDE 20 MG: 20 TABLET ORAL at 08:57

## 2019-09-24 RX ADMIN — TAMSULOSIN HYDROCHLORIDE 0.4 MG: 0.4 CAPSULE ORAL at 08:56

## 2019-09-24 RX ADMIN — SENNOSIDES AND DOCUSATE SODIUM 2 TABLET: 8.6; 5 TABLET ORAL at 08:56

## 2019-09-24 RX ADMIN — MORPHINE SULFATE 15 MG: 15 TABLET, EXTENDED RELEASE ORAL at 15:10

## 2019-09-24 RX ADMIN — HYDROCORTISONE: 0.5 CREAM TOPICAL at 00:11

## 2019-09-24 RX ADMIN — SODIUM CHLORIDE 500 ML: 9 INJECTION, SOLUTION INTRAVENOUS at 17:20

## 2019-09-24 RX ADMIN — FLUTICASONE FUROATE AND VILANTEROL TRIFENATATE 1 PUFF: 100; 25 POWDER RESPIRATORY (INHALATION) at 08:58

## 2019-09-24 RX ADMIN — RANITIDINE 150 MG: 150 TABLET ORAL at 20:30

## 2019-09-24 RX ADMIN — OXYCODONE HYDROCHLORIDE 10 MG: 5 TABLET ORAL at 05:32

## 2019-09-24 RX ADMIN — ATORVASTATIN CALCIUM 20 MG: 20 TABLET, FILM COATED ORAL at 08:56

## 2019-09-24 RX ADMIN — GABAPENTIN 600 MG: 300 CAPSULE ORAL at 22:24

## 2019-09-24 RX ADMIN — METOPROLOL TARTRATE 100 MG: 50 TABLET ORAL at 20:30

## 2019-09-24 RX ADMIN — GABAPENTIN 600 MG: 300 CAPSULE ORAL at 08:56

## 2019-09-24 RX ADMIN — ACETAMINOPHEN 650 MG: 325 TABLET, FILM COATED ORAL at 11:58

## 2019-09-24 RX ADMIN — OXYCODONE HYDROCHLORIDE 10 MG: 5 TABLET ORAL at 20:53

## 2019-09-24 RX ADMIN — ACETAMINOPHEN 650 MG: 325 TABLET, FILM COATED ORAL at 06:51

## 2019-09-24 RX ADMIN — OXYCODONE HYDROCHLORIDE 10 MG: 5 TABLET ORAL at 01:26

## 2019-09-24 RX ADMIN — OXYCODONE HYDROCHLORIDE 10 MG: 5 TABLET ORAL at 11:58

## 2019-09-24 RX ADMIN — METHOCARBAMOL TABLETS 750 MG: 750 TABLET, COATED ORAL at 03:33

## 2019-09-24 RX ADMIN — OXYCODONE HYDROCHLORIDE 10 MG: 5 TABLET ORAL at 16:36

## 2019-09-24 RX ADMIN — OXYCODONE HYDROCHLORIDE 10 MG: 5 TABLET ORAL at 08:57

## 2019-09-24 RX ADMIN — METHOCARBAMOL TABLETS 750 MG: 750 TABLET, COATED ORAL at 09:52

## 2019-09-24 RX ADMIN — SENNOSIDES AND DOCUSATE SODIUM 2 TABLET: 8.6; 5 TABLET ORAL at 20:30

## 2019-09-24 RX ADMIN — ACETAMINOPHEN 650 MG: 325 TABLET, FILM COATED ORAL at 01:26

## 2019-09-24 RX ADMIN — MORPHINE SULFATE 15 MG: 15 TABLET, EXTENDED RELEASE ORAL at 03:32

## 2019-09-24 RX ADMIN — ACETAMINOPHEN 650 MG: 325 TABLET, FILM COATED ORAL at 20:53

## 2019-09-24 RX ADMIN — Medication 10 MG: at 08:56

## 2019-09-24 RX ADMIN — METHOCARBAMOL TABLETS 750 MG: 750 TABLET, COATED ORAL at 17:22

## 2019-09-24 RX ADMIN — METOPROLOL TARTRATE 100 MG: 50 TABLET ORAL at 08:57

## 2019-09-24 RX ADMIN — RANITIDINE 150 MG: 150 TABLET ORAL at 08:57

## 2019-09-24 RX ADMIN — LEVOFLOXACIN 500 MG: 250 TABLET, FILM COATED ORAL at 08:57

## 2019-09-24 ASSESSMENT — ACTIVITIES OF DAILY LIVING (ADL)
ADLS_ACUITY_SCORE: 32

## 2019-09-24 NOTE — PROGRESS NOTES
Sw Progress Note  Chart Reviewed, Pt discussed in Interdisciplinary Rounds.   Pt anticipated to discharge to TCU  Referrals out.    Intervention:   Pt declined a [facility] due to lack of bed availability.  SW called and left message with [facility] regarding referral.  SW may need to send additional referrals for TCU today and with speak with pt and family for additional choices.     SW received call from Josephine with [facility] asking if pt's goal is return home. SW returned call and left voice message indicating that this is the plan. SW requesting return call regarding assessment and ability to admit pt.  SW left two messages requesting return call.  Discharge to TCU anticipated tomorrow.    SW received return call from Josephine at [facility] stating that they are able to accept pt for admission tomorrow.  Left message on family phone requesting return call.    Plan: Pursuing TCU placement.  Anticipated discharge placement: [facility] on Wednesday 9/25. Call  ( [phone]) to confirm in am and finalize discharge plan.    GERALD Melendez  [facility]  Phone: [phone]

## 2019-09-24 NOTE — PROGRESS NOTES
[facility]    Neurosurgery  Daily Post-Op Note    Assessment & Plan   Procedure(s):  T9-10 THORASIC LAMINECTOMY WITH PRE-PROCEDURE NEURO MONITORING   -6 Days Post-Op  No immediate surgical complications identified.  No excessive bleeding  Tolerating physical therapy and rehabilitation well.  Pain still up and down, does get to 10/10 at times.   Long acting MS Contin started yesterday, so hopefully will get better baseline pain control now  He does have good EHL in bilateral feet    Plan:  -Advance activity as tolerated  -Continue supportive and symptomatic treatment  -Advance diet as tolerated  -TCU when bed available      Leonardo Bhatia    Interval History   Stable.     Physical Exam   Temp: 98.1  F (36.7  C) Temp src: Oral BP: 134/85 Pulse: 90 Heart Rate: 84 Resp: 18 SpO2: 94 % O2 Device: None (Room air) Oxygen Delivery: 1 LPM  Vitals:    09/21/19 1228   Weight: 88.3 kg (194 lb 11.2 oz)     Vital Signs with Ranges  Temp:  [97.8  F (36.6  C)-98.9  F (37.2  C)] 98.1  F (36.7  C)  Pulse:  [90] 90  Heart Rate:  [73-99] 84  Resp:  [16-20] 18  BP: (121-144)/(74-96) 134/85  SpO2:  [89 %-98 %] 94 %  I/O last 3 completed shifts:  In: -   Out: 1400 [Urine:1400]    Alert and oriented.      Incision:   CDI    Medications        atorvastatin  20 mg Oral Daily     citalopram  20 mg Oral Daily     ranitidine  150 mg Oral Q12H    Or     famotidine  20 mg Intravenous Q12H     fluticasone-vilanterol  1 puff Inhalation Daily     gabapentin  600 mg Oral TID     levofloxacin  500 mg Oral Daily     metoprolol tartrate  100 mg Oral BID     morphine  15 mg Oral Q12H ZCA     senna-docusate  1 tablet Oral BID    Or     senna-docusate  2 tablet Oral BID     sodium chloride (PF)  3 mL Intravenous Q8H     tamsulosin  0.4 mg Oral Daily       Data         Leonardo Bhatia PAViktorC  [facility]

## 2019-09-24 NOTE — PLAN OF CARE
POD 6 posterior T9-10 laminectomy. Dressing CDI. A/Ox4. VSS on 1.5L oxygen. Wheelchair-bound, Ax2/Lift. BLE 1/5 strength, able to wiggle toes, numbness intermittently. BUE 5/5 strength. Pulses equal, strong. Regular diet. Pain controlled with MS Contin scheduled, Oxycodone q3h, Robaxin q6h, Tylenol q4h. Pain rated 8/10 beginning of shift, 0-1/10. Diffuse red rash on back, hydrocortisone cream applied. LS clear. Bowel sounds active although pt is constipated, scheduled Senna given. Regular diet, poor appetite. Repositioned in bed q2h. Encouraged to call with questions/needs/concerns. Will continue to monitor.

## 2019-09-24 NOTE — PLAN OF CARE
Pt alert and oriented x4. VSS on RA. CMS unchanged: able to wiggle toes on BLEs, but no other voluntary movement of LEs. Continues to report partial numbness in LEs. Lung sounds clear. +BS and passing flatus, no BM yet.  Pt c/o of persistent /710 back pain, sleeping after receiving prn oxycodone and robaxin. Incision on back WDL with mild edema to yola-incision area. Dressing changed. Paulson patent with low urine o/p. Currently receiving IV bolus. Poor appetite, encouraged po intake. Repositioned prn. Plan to discharge to TCU, possibly tomorrow.

## 2019-09-24 NOTE — PROGRESS NOTES
"[facility]  Hospitalist Progress Note        Luisito Littlejohn,   2019        Interval History:      Patient states he is doing better today.          Assessment and Plan:        Elaina Valenzuela is a 58 year old male who was admitted on 2019 for T9 through T10 bilateral laminectomy for decompression of central stenosis.     Laminectomy. Meningioma.   - Post op cares per Neurosurgery.    - PT/OT.    - Pain control.      HTN.  BP controlled on metoprolol.    - Hydrochlorothiazide is on hold.     Asthma.    - Continue Breo Ellipta and prn albuterol.     HL.    - Continue atorvastatin.     Depression.    - Continue Celexa.     UTI, not present on admission.    - Urine and blood cultures pending.  - Levaquin.      LLL CAP, not present on admission.   - Levaquin.      DVT Prophylaxis: Defer to Surgery team.      Code: Full Code     Disposition: Stable for discharge to TCU when accepted.                    Physical Exam:      Heart Rate: 76    Blood pressure 134/85, pulse 90, temperature 98.1  F (36.7  C), temperature source Oral, resp. rate 18, height 1.676 m (5' 6\"), weight 88.3 kg (194 lb 11.2 oz), SpO2 94 %.    Vitals:    19 1228   Weight: 88.3 kg (194 lb 11.2 oz)       Vital Sign Ranges  Temperature Temp  Av.2  F (36.8  C)  Min: 97.8  F (36.6  C)  Max: 98.9  F (37.2  C)   Blood pressure Systolic (24hrs), Av , Min:121 , Max:144        Diastolic (24hrs), Av, Min:74, Max:96      Pulse Pulse  Av  Min: 90  Max: 90   Respirations Resp  Av.8  Min: 16  Max: 20   Pulse oximetry SpO2  Av.2 %  Min: 89 %  Max: 98 %     Vital Signs with Ranges  Temp:  [97.8  F (36.6  C)-98.9  F (37.2  C)] 98.1  F (36.7  C)  Pulse:  [90] 90  Heart Rate:  [73-99] 76  Resp:  [16-20] 18  BP: (121-144)/(74-96) 134/85  SpO2:  [89 %-98 %] 94 %    I/O Last 3 Shifts:   I/O last 3 completed shifts:  In: -   Out: 1400 [Urine:1400]    I/O past 24 hours:     Intake/Output Summary (Last 24 " hours) at 2019 0830  Last data filed at 2019 0608  Gross per 24 hour   Intake --   Output 1400 ml   Net -1400 ml     GENERAL: Alert. NAD. Conversational, appropriate. Pleasant.   HEENT: Normocephalic. EOMI. No icterus or injection. Nares normal.   LUNGS: Clear to auscultation. No dyspnea at rest.   HEART: Regular rate. Extremities perfused.   ABDOMEN: Soft, nontender, and nondistended. Positive bowel sounds.   EXTREMITIES: No LE edema noted. Dressing to back, c/d/i.   NEUROLOGIC: Moves extremities x4 on command.         Prior to Admission Medications:        Medications Prior to Admission   Medication Sig Dispense Refill Last Dose     albuterol (PROAIR HFA/PROVENTIL HFA/VENTOLIN HFA) 108 (90 Base) MCG/ACT inhaler Inhale 2 puffs into the lungs every 6 hours as needed for shortness of breath / dyspnea or wheezing 1 Inhaler 10      amitriptyline (ELAVIL) 100 MG tablet TAKE 1 TABLET BY MOUTH EVERY DAY AT BEDTIME 90 tablet 3 Taking     atorvastatin (LIPITOR) 20 MG tablet Take 1 tablet (20 mg) by mouth daily 90 tablet 1 Taking     BENEFIBER DRINK MIX OR POWD by mouth daily   Taking     citalopram (CELEXA) 20 MG tablet Take 1 tablet (20 mg) by mouth daily 90 tablet 1 Taking     [] doxycycline monohydrate (MONODOX) 100 MG capsule Take 100 mg by mouth 2 times daily        fluticasone-salmeterol (ADVAIR) 100-50 MCG/DOSE inhaler Inhale 1 puff into the lungs 2 times daily 1 Inhaler 3      gabapentin (NEURONTIN) 300 MG capsule Take 2 capsules (600 mg) by mouth 3 times daily 360 capsule 1 Taking     hydrochlorothiazide (HYDRODIURIL) 25 MG tablet TAKE 1 TABLET BY MOUTH EVERY MORNING. NO REFILLS UNTIL FOLLOW UP 90 tablet 1 Taking     metoprolol tartrate (LOPRESSOR) 100 MG tablet TAKE 1 TABLET(100 MG) BY MOUTH TWICE DAILY 180 tablet 2 Taking     naproxen (NAPROSYN) 500 MG tablet Take 500 mg by mouth 2 times daily (with meals)        tamsulosin (FLOMAX) 0.4 MG capsule Take 1 capsule (0.4 mg) by mouth daily 30 capsule  0 Taking            Medications:        Current Facility-Administered Medications   Medication Last Rate     Current Facility-Administered Medications   Medication Dose Route Frequency     atorvastatin  20 mg Oral Daily     citalopram  20 mg Oral Daily     ranitidine  150 mg Oral Q12H    Or     famotidine  20 mg Intravenous Q12H     fluticasone-vilanterol  1 puff Inhalation Daily     gabapentin  600 mg Oral TID     levofloxacin  500 mg Oral Daily     metoprolol tartrate  100 mg Oral BID     morphine  15 mg Oral Q12H ZAC     senna-docusate  1 tablet Oral BID    Or     senna-docusate  2 tablet Oral BID     sodium chloride (PF)  3 mL Intravenous Q8H     tamsulosin  0.4 mg Oral Daily     Current Facility-Administered Medications   Medication Dose Route Frequency     acetaminophen  650 mg Oral Q4H PRN     albuterol  2 puff Inhalation Q6H PRN     amitriptyline  100 mg Oral At Bedtime PRN     sore throat lozenge  1 lozenge Buccal Q1H PRN     bisacodyl  10 mg Rectal Daily PRN     calcium carbonate  1,000 mg Oral 4x Daily PRN     hydrocortisone   Topical BID PRN     HYDROmorphone  0.3-0.5 mg Intravenous Q1H PRN     hydrOXYzine  25 mg Oral Q6H PRN     lidocaine 4%   Topical Q1H PRN     lidocaine (buffered or not buffered)  1 mL Other Q1H PRN     methocarbamol  750 mg Oral Q6H PRN     metoclopramide  10 mg Oral Q6H PRN    Or     metoclopramide  10 mg Intravenous Q6H PRN     naloxone  0.1-0.4 mg Intravenous Q2 Min PRN     ondansetron  4 mg Oral Q6H PRN    Or     ondansetron  4 mg Intravenous Q6H PRN     oxyCODONE  5-10 mg Oral Q3H PRN     polyethylene glycol  17 g Oral Daily PRN     prochlorperazine  10 mg Intravenous Q6H PRN    Or     prochlorperazine  10 mg Oral Q6H PRN     senna-docusate  1 tablet Oral BID PRN    Or     senna-docusate  2 tablet Oral BID PRN     sodium chloride (PF)  3 mL Intracatheter Q1H PRN            Data:      Lab data reviewed.   Recent Labs   Lab 09/23/19  0841 09/22/19  0807 09/21/19  0917   09/19/19  0907   HGB 10.9* 11.5* 11.3*   < > 11.6*   MCV 86 89 88   < >  --     314 265   < >  --    *  --  135  --  137   POTASSIUM 3.8  --  3.6  --  3.6   CHLORIDE 98  --  101  --  101   CO2 30  --  33*  --  30   BUN 10  --  16  --  27   CR 0.78  --  0.94  --  0.92   ANIONGAP 4  --  1*  --  6   ADA 8.3*  --  8.0*  --  8.0*   *  --  133*  --  96    < > = values in this interval not displayed.           Imaging:      Imaging data reviewed.     BRENDEN LoveO.  [place] [facility]  Pager [phone]

## 2019-09-24 NOTE — PROVIDER NOTIFICATION
Text page sent to hospitalist: FYI Pt's urine o/p was 175 ml over 8 hr and dark daniella in color. I am encouraging p.o. intake.

## 2019-09-24 NOTE — PLAN OF CARE
Discharge Planner PT   Patient plan for discharge: TCU  Current status: Patient needs mod assist to transfer sup to sit and mod assist to maintain sitting balance at least 50% of the time. Patient has to use bilateral UE's to assist with sitting balance. Transferred to chair using overhead left and universal sling.   Barriers to return to prior living situation: Amount of assist needed for all mobility, pain.   Recommendations for discharge: TCU  Rationale for recommendations: Patient was w/c bound before but about to transfer with assist of son to chair. Currently needs to use lift for transfers.        Entered by: Sabrina Novoa 09/24/2019 5:18 PM

## 2019-09-24 NOTE — PLAN OF CARE
POD 5 posterior T9-T10 laminectomy. Alert and oriented x3. VSS on RA. CMS with minimal movement of bilateral feet, o/w unable to move bilateral lower extremities. Pt reports numbness in BLEs improving. Lung sounds clear. +BS and flatus, no BM since prior to surgery. Suppository administered with no results. Surgical incision WDL with intact steri-strips, dressing changed. C/o of severe back pain, worse with any activity. Decreased with prn and scheduled analgesics. Declined PT and refused to attempt to get our of bed d/t pain. Repositioned q 2 hrs. Poor appetite. Scoring green on stoplight aggression tool.

## 2019-09-25 VITALS
RESPIRATION RATE: 18 BRPM | SYSTOLIC BLOOD PRESSURE: 139 MMHG | HEART RATE: 90 BPM | TEMPERATURE: 98.7 F | HEIGHT: 66 IN | DIASTOLIC BLOOD PRESSURE: 96 MMHG | WEIGHT: 194.7 LBS | OXYGEN SATURATION: 94 % | BODY MASS INDEX: 31.29 KG/M2

## 2019-09-25 LAB
ERYTHROCYTE [DISTWIDTH] IN BLOOD BY AUTOMATED COUNT: 14.3 % (ref 10–15)
HCT VFR BLD AUTO: 32.4 % (ref 40–53)
HGB BLD-MCNC: 10.5 G/DL (ref 13.3–17.7)
MCH RBC QN AUTO: 27.8 PG (ref 26.5–33)
MCHC RBC AUTO-ENTMCNC: 32.4 G/DL (ref 31.5–36.5)
MCV RBC AUTO: 86 FL (ref 78–100)
PLATELET # BLD AUTO: 346 10E9/L (ref 150–450)
RBC # BLD AUTO: 3.78 10E12/L (ref 4.4–5.9)
WBC # BLD AUTO: 16.9 10E9/L (ref 4–11)

## 2019-09-25 PROCEDURE — 25000132 ZZH RX MED GY IP 250 OP 250 PS 637: Performed by: HOSPITALIST

## 2019-09-25 PROCEDURE — 85027 COMPLETE CBC AUTOMATED: CPT | Performed by: NEUROLOGICAL SURGERY

## 2019-09-25 PROCEDURE — 36415 COLL VENOUS BLD VENIPUNCTURE: CPT | Performed by: NEUROLOGICAL SURGERY

## 2019-09-25 PROCEDURE — 25000132 ZZH RX MED GY IP 250 OP 250 PS 637: Performed by: PHYSICIAN ASSISTANT

## 2019-09-25 PROCEDURE — 99207 ZZC CDG-MDM COMPONENT: MEETS MODERATE - UP CODED: CPT | Performed by: HOSPITALIST

## 2019-09-25 PROCEDURE — 99232 SBSQ HOSP IP/OBS MODERATE 35: CPT | Performed by: HOSPITALIST

## 2019-09-25 PROCEDURE — 25000132 ZZH RX MED GY IP 250 OP 250 PS 637: Performed by: NEUROLOGICAL SURGERY

## 2019-09-25 RX ORDER — OXYCODONE HYDROCHLORIDE 5 MG/1
5-10 TABLET ORAL
Qty: 50 TABLET | Refills: 0 | Status: SHIPPED | DISCHARGE
Start: 2019-09-25 | End: 2019-09-27

## 2019-09-25 RX ORDER — METHOCARBAMOL 750 MG/1
750 TABLET, FILM COATED ORAL EVERY 6 HOURS PRN
Qty: 50 TABLET | Refills: 1 | DISCHARGE
Start: 2019-09-25 | End: 2020-11-11

## 2019-09-25 RX ORDER — LEVOFLOXACIN 500 MG/1
500 TABLET, FILM COATED ORAL DAILY
DISCHARGE
Start: 2019-09-26 | End: 2019-10-01

## 2019-09-25 RX ORDER — POLYETHYLENE GLYCOL 3350 17 G/17G
17 POWDER, FOR SOLUTION ORAL DAILY PRN
DISCHARGE
Start: 2019-09-25 | End: 2019-12-03

## 2019-09-25 RX ORDER — MORPHINE SULFATE 15 MG/1
15 TABLET, FILM COATED, EXTENDED RELEASE ORAL EVERY 12 HOURS
Qty: 50 TABLET | Refills: 0 | Status: SHIPPED | DISCHARGE
Start: 2019-09-25 | End: 2019-09-27

## 2019-09-25 RX ORDER — AMOXICILLIN 250 MG
2 CAPSULE ORAL 2 TIMES DAILY PRN
DISCHARGE
Start: 2019-09-25 | End: 2019-09-27

## 2019-09-25 RX ADMIN — RANITIDINE 150 MG: 150 TABLET ORAL at 09:22

## 2019-09-25 RX ADMIN — LEVOFLOXACIN 500 MG: 250 TABLET, FILM COATED ORAL at 09:22

## 2019-09-25 RX ADMIN — Medication 10 MG: at 09:21

## 2019-09-25 RX ADMIN — HYDROCORTISONE: 0.5 CREAM TOPICAL at 09:27

## 2019-09-25 RX ADMIN — CITALOPRAM HYDROBROMIDE 20 MG: 20 TABLET ORAL at 09:22

## 2019-09-25 RX ADMIN — METHOCARBAMOL TABLETS 750 MG: 750 TABLET, COATED ORAL at 06:21

## 2019-09-25 RX ADMIN — OXYCODONE HYDROCHLORIDE 10 MG: 5 TABLET ORAL at 04:27

## 2019-09-25 RX ADMIN — TAMSULOSIN HYDROCHLORIDE 0.4 MG: 0.4 CAPSULE ORAL at 09:22

## 2019-09-25 RX ADMIN — OXYCODONE HYDROCHLORIDE 10 MG: 5 TABLET ORAL at 13:21

## 2019-09-25 RX ADMIN — ACETAMINOPHEN 650 MG: 325 TABLET, FILM COATED ORAL at 07:25

## 2019-09-25 RX ADMIN — ATORVASTATIN CALCIUM 20 MG: 20 TABLET, FILM COATED ORAL at 09:22

## 2019-09-25 RX ADMIN — METOPROLOL TARTRATE 100 MG: 50 TABLET ORAL at 09:22

## 2019-09-25 RX ADMIN — SENNOSIDES AND DOCUSATE SODIUM 2 TABLET: 8.6; 5 TABLET ORAL at 09:22

## 2019-09-25 RX ADMIN — METHOCARBAMOL TABLETS 750 MG: 750 TABLET, COATED ORAL at 14:25

## 2019-09-25 RX ADMIN — MORPHINE SULFATE 15 MG: 15 TABLET, EXTENDED RELEASE ORAL at 03:55

## 2019-09-25 RX ADMIN — OXYCODONE HYDROCHLORIDE 10 MG: 5 TABLET ORAL at 07:25

## 2019-09-25 RX ADMIN — OXYCODONE HYDROCHLORIDE 10 MG: 5 TABLET ORAL at 00:19

## 2019-09-25 RX ADMIN — GABAPENTIN 600 MG: 300 CAPSULE ORAL at 09:21

## 2019-09-25 RX ADMIN — FLUTICASONE FUROATE AND VILANTEROL TRIFENATATE 1 PUFF: 100; 25 POWDER RESPIRATORY (INHALATION) at 09:26

## 2019-09-25 RX ADMIN — ACETAMINOPHEN 650 MG: 325 TABLET, FILM COATED ORAL at 13:21

## 2019-09-25 ASSESSMENT — ACTIVITIES OF DAILY LIVING (ADL)
ADLS_ACUITY_SCORE: 32

## 2019-09-25 NOTE — PROGRESS NOTES
"BRIEF NUTRITION ASSESSMENT      REASON FOR ASSESSMENT:  Elaina Valenzuela is a 58 year old male assessed by Registered Dietitian for LOS      CURRENT DIET AND INTAKE:  Diet:  Regular            Room Service with Assist              Chart reviewed  Note pt with challenges controlling pain  Appetite has been fair  Breakfast today - pancake, banana, milk, juice, bites of eggs  Pt is agreeable to trying a 4oz nutrition supplement for additional protein     ANTHROPOMETRICS:  Height: 5'6\"  Weight:(9/21) 88.3 kg /  194 lbs 11.2 oz  Body mass index is 31.43 kg/m .   Weight Status: Obesity Grade I BMI 30-34.9  IBW:  64.5 kg  %IBW: 137%  Weight History:   Wt Readings from Last 10 Encounters:   09/21/19 88.3 kg (194 lb 11.2 oz)   03/12/18 81.2 kg (179 lb)   10/10/17 87.5 kg (193 lb)   12/22/16 88.9 kg (196 lb)   05/13/16 77.1 kg (170 lb)   03/23/16 82.6 kg (182 lb)   01/19/16 82.6 kg (182 lb)   11/09/15 82.6 kg (182 lb)   08/27/15 81.2 kg (179 lb)   06/08/15 79.4 kg (175 lb)         LABS:  Labs noted    MALNUTRITION:  Patient does not meet two of the following criteria necessary for diagnosing malnutrition: significant weight loss, reduced intake, subcutaneous fat loss, muscle loss or fluid retention. Nutrition Focused Physical Assessment (NFPA) not appropriate at this time.    NUTRITION INTERVENTION:  Nutrition Diagnosis:  No nutrition diagnosis at this time.    Implementation:  Nutrition Education ---> Per Provider order if indicated  Pt is being seen daily for meal ordering assistance  Will send a 4oz PLUS2 with beneprotein (315 cals, 15 gm pro) with all meals    FOLLOW UP/MONITORING:   Will re-evaluate in 7 - 10 days, or sooner, if re-consulted.          "

## 2019-09-25 NOTE — PROGRESS NOTES
[facility]    Neurosurgery Progress Note    Date of Service (when I saw the patient): 09/25/2019     Assessment & Plan   Elaina Valenzuela is a 58 year old male who was admitted on 9/18/2019 with history of L4-5 fusion and T7-8 laminectomy for resection of meningioma.  Presented with a year of progressive severe weakness in the legs, ultimately resulting in near paralysis and wheelchair usage.  Imaging showed severe cord compression at T9-10, with marked ossification of the ligamentum flavum and facet hypertrophy. Subsequently underwent T9-10 bilateral laminectomy for decompression. Today, he is lying in bed and appears comfortable. States his pain is managed at present. Incision checked with RN and dressing changed; incision with mild edema, but no signs of infection or concern. Per RN had successful BM last PM. Hospitalist has been following along as well; appreciate their assistance. OK to discharge to TCU when bed available.     Active Problems:    S/P laminectomy    Assessment: s/p T9-10 bilateral laminectomy for decompression    Plan:   -Pain control  -Continue with therapies  -OK to discharge to TCU when bed available; appreciate hospitalist assistance in management      I have discussed the following assessment and plan with Dr. Munguia who is in agreement with initial plan and will follow up with further consultation recommendations.      Juliana Cardenas PA-C  [facility]  [place] [address]    Tel [phone]  Pager [phone]      Interval History   Stable. Pain seems better controlled on ER pain medication.    Physical Exam   Temp: 98.8  F (37.1  C) Temp src: Oral BP: 122/76   Heart Rate: 94 Resp: 16 SpO2: 91 % O2 Device: None (Room air)    Vitals:    09/21/19 1228   Weight: 194 lb 11.2 oz (88.3 kg)     Vital Signs with Ranges  Temp:  [98.1  F (36.7  C)-101.7  F (38.7  C)] 98.8  F (37.1  C)  Heart Rate:  [78-94]  "94  Resp:  [14-18] 16  BP: (102-132)/(64-85) 122/76  SpO2:  [90 %-95 %] 91 %  I/O last 3 completed shifts:  In: 1235 [P.O.:700; IV Piggyback:535]  Out: 575 [Urine:575]    Heart Rate: 94, Blood pressure 122/76, pulse 90, temperature 98.8  F (37.1  C), temperature source Oral, resp. rate 16, height 5' 6\" (1.676 m), weight 194 lb 11.2 oz (88.3 kg), SpO2 91 %.  194 lbs 11.2 oz  HEENT:  Normocephalic, atraumatic.  PERRLA.  EOM s intact.    Heart:  No peripheral edema  Lungs:  No SOB  Skin:  Warm and dry. Incision with suture intact with mild edema. No signs of infection.  Extremities:  No edema, cyanosis or clubbing.    NEUROLOGICAL EXAMINATION:   Mental status:  Alert and Oriented x 3, speech is fluent.  Cranial nerves:  II-XII intact.   Motor:  Able to wiggle toes bilaterally, good strength with EHL, DF/PF seems to be improved slightly    Medications       atorvastatin  20 mg Oral Daily     citalopram  20 mg Oral Daily     ranitidine  150 mg Oral Q12H    Or     famotidine  20 mg Intravenous Q12H     fluticasone-vilanterol  1 puff Inhalation Daily     gabapentin  600 mg Oral TID     levofloxacin  500 mg Oral Daily     metoprolol tartrate  100 mg Oral BID     morphine  15 mg Oral Q12H ZAC     senna-docusate  1 tablet Oral BID    Or     senna-docusate  2 tablet Oral BID     sodium chloride (PF)  3 mL Intravenous Q8H     tamsulosin  0.4 mg Oral Daily       Data   CBC RESULTS:   Recent Labs   Lab Test 09/25/19  0538   WBC 16.9*   RBC 3.78*   HGB 10.5*   HCT 32.4*   MCV 86   MCH 27.8   MCHC 32.4   RDW 14.3        Basic Metabolic Panel:  Lab Results   Component Value Date     09/24/2019      Lab Results   Component Value Date    POTASSIUM 3.4 09/24/2019     Lab Results   Component Value Date    CHLORIDE 102 09/24/2019     Lab Results   Component Value Date    ADA 8.7 09/24/2019     Lab Results   Component Value Date    CO2 32 09/24/2019     Lab Results   Component Value Date    BUN 8 09/24/2019     Lab Results "   Component Value Date    CR 0.85 09/24/2019     Lab Results   Component Value Date     09/24/2019     INR:  No results found for: INR

## 2019-09-25 NOTE — PLAN OF CARE
Pt alert and oriented x4. VSS on RA. CMS unchanged: able to wiggle toes on BLEs, but no other voluntary movement of LEs. Continues to report partial numbness in LEs. Lung sounds clear. +BS and passing flatus, no BM yet.  receiving prn oxycodone . Incision on back WDL with mild edema to yola-incision area. Dressing intact. Paulson patent . Poor appetite, encouraged po intake. Repositioned prn.Enema given,waiting for result. Lactic acid 1.6 .Plan to discharge to TCU, possibly tomorrow.

## 2019-09-25 NOTE — PLAN OF CARE
Physical Therapy Discharge Summary    Reason for therapy discharge:    Discharged to transitional care facility.    Progress towards therapy goal(s). See goals on Care Plan in [facility] electronic health record for goal details.  Goals not met.  Barriers to achieving goals:   discharge from facility.    Therapy recommendation(s):    Continued therapy is recommended.  Rationale/Recommendations:  Continued PT at next level of care to progress safety and IND with mobility.    **Pt not seen by discharging therapist on this date, note written based on previous treating therapist's notes and recommendations.

## 2019-09-25 NOTE — DISCHARGE INSTRUCTIONS
You are discharging to:    Stephania Page [facility]  [address] [place], [place]  [id number] [phone]

## 2019-09-25 NOTE — PROGRESS NOTES
"[facility]  Hospitalist Progress Note        Luisito Littlejohn,   2019        Interval History:      Patient continues to have pain, discussed with nursing staff. Patient did have bowel movement this am.          Assessment and Plan:        Elaina Valenzuela is a 58 year old male who was admitted on 2019 for T9 through T10 bilateral laminectomy for decompression of central stenosis.     Laminectomy. Meningioma.   - Post op cares per Neurosurgery.    - PT/OT.    - Pain control.      HTN.  BP controlled on metoprolol.    - Hydrochlorothiazide is on hold.     Asthma.    - Continue Breo Ellipta and prn albuterol.     HL.    - Continue atorvastatin.     Depression.    - Continue Celexa.     UTI, not present on admission.    - Urine and blood cultures pending.  - Levaquin.      LLL CAP, not present on admission.   - Levaquin.      DVT Prophylaxis: Defer to Surgery team.      Code: Full Code     Disposition: Stable for discharge to TCU when accepted.                    Physical Exam:      Heart Rate: 86    Blood pressure 122/76, pulse 90, temperature 98.8  F (37.1  C), temperature source Oral, resp. rate 16, height 1.676 m (5' 6\"), weight 88.3 kg (194 lb 11.2 oz), SpO2 91 %.    Vitals:    19 1228   Weight: 88.3 kg (194 lb 11.2 oz)       Vital Sign Ranges  Temperature Temp  Av.2  F (37.3  C)  Min: 98.1  F (36.7  C)  Max: 101.7  F (38.7  C)   Blood pressure Systolic (24hrs), Av , Min:102 , Max:132        Diastolic (24hrs), Av, Min:64, Max:85      Pulse No data recorded   Respirations Resp  Av.7  Min: 14  Max: 18   Pulse oximetry SpO2  Av.3 %  Min: 90 %  Max: 95 %     Vital Signs with Ranges  Temp:  [98.1  F (36.7  C)-101.7  F (38.7  C)] 98.8  F (37.1  C)  Heart Rate:  [78-93] 86  Resp:  [14-18] 16  BP: (102-132)/(64-85) 122/76  SpO2:  [90 %-95 %] 91 %    I/O Last 3 Shifts:   I/O last 3 completed shifts:  In: 1235 [P.O.:700; IV Piggyback:535]  Out: 575 " [Urine:575]    I/O past 24 hours:     Intake/Output Summary (Last 24 hours) at 2019 0848  Last data filed at 2019 0728  Gross per 24 hour   Intake 1285 ml   Output 575 ml   Net 710 ml     GENERAL: Alert. NAD. Conversational, appropriate. Pleasant.   HEENT: Normocephalic. EOMI. No icterus or injection. Nares normal.   LUNGS: Clear to auscultation. No dyspnea at rest.   HEART: Regular rate. Extremities perfused.   ABDOMEN: Soft, nontender, and nondistended. Positive bowel sounds.   EXTREMITIES: No LE edema noted. Dressing to back, c/d/i.   NEUROLOGIC: Moves extremities x4, follows commands.          Prior to Admission Medications:        Medications Prior to Admission   Medication Sig Dispense Refill Last Dose     albuterol (PROAIR HFA/PROVENTIL HFA/VENTOLIN HFA) 108 (90 Base) MCG/ACT inhaler Inhale 2 puffs into the lungs every 6 hours as needed for shortness of breath / dyspnea or wheezing 1 Inhaler 10      amitriptyline (ELAVIL) 100 MG tablet TAKE 1 TABLET BY MOUTH EVERY DAY AT BEDTIME 90 tablet 3 Taking     atorvastatin (LIPITOR) 20 MG tablet Take 1 tablet (20 mg) by mouth daily 90 tablet 1 Taking     BENEFIBER DRINK MIX OR POWD by mouth daily   Taking     citalopram (CELEXA) 20 MG tablet Take 1 tablet (20 mg) by mouth daily 90 tablet 1 Taking     [] doxycycline monohydrate (MONODOX) 100 MG capsule Take 100 mg by mouth 2 times daily        fluticasone-salmeterol (ADVAIR) 100-50 MCG/DOSE inhaler Inhale 1 puff into the lungs 2 times daily 1 Inhaler 3      gabapentin (NEURONTIN) 300 MG capsule Take 2 capsules (600 mg) by mouth 3 times daily 360 capsule 1 Taking     hydrochlorothiazide (HYDRODIURIL) 25 MG tablet TAKE 1 TABLET BY MOUTH EVERY MORNING. NO REFILLS UNTIL FOLLOW UP 90 tablet 1 Taking     metoprolol tartrate (LOPRESSOR) 100 MG tablet TAKE 1 TABLET(100 MG) BY MOUTH TWICE DAILY 180 tablet 2 Taking     naproxen (NAPROSYN) 500 MG tablet Take 500 mg by mouth 2 times daily (with meals)         tamsulosin (FLOMAX) 0.4 MG capsule Take 1 capsule (0.4 mg) by mouth daily 30 capsule 0 Taking            Medications:        Current Facility-Administered Medications   Medication Last Rate     Current Facility-Administered Medications   Medication Dose Route Frequency     atorvastatin  20 mg Oral Daily     citalopram  20 mg Oral Daily     ranitidine  150 mg Oral Q12H    Or     famotidine  20 mg Intravenous Q12H     fluticasone-vilanterol  1 puff Inhalation Daily     gabapentin  600 mg Oral TID     levofloxacin  500 mg Oral Daily     metoprolol tartrate  100 mg Oral BID     morphine  15 mg Oral Q12H ZAC     senna-docusate  1 tablet Oral BID    Or     senna-docusate  2 tablet Oral BID     sodium chloride (PF)  3 mL Intravenous Q8H     tamsulosin  0.4 mg Oral Daily     Current Facility-Administered Medications   Medication Dose Route Frequency     acetaminophen  650 mg Oral Q4H PRN     albuterol  2 puff Inhalation Q6H PRN     amitriptyline  100 mg Oral At Bedtime PRN     sore throat lozenge  1 lozenge Buccal Q1H PRN     bisacodyl  10 mg Rectal Daily PRN     calcium carbonate  1,000 mg Oral 4x Daily PRN     hydrocortisone   Topical BID PRN     HYDROmorphone  0.3-0.5 mg Intravenous Q1H PRN     hydrOXYzine  25 mg Oral Q6H PRN     lidocaine 4%   Topical Q1H PRN     lidocaine (buffered or not buffered)  1 mL Other Q1H PRN     methocarbamol  750 mg Oral Q6H PRN     metoclopramide  10 mg Oral Q6H PRN    Or     metoclopramide  10 mg Intravenous Q6H PRN     naloxone  0.1-0.4 mg Intravenous Q2 Min PRN     ondansetron  4 mg Oral Q6H PRN    Or     ondansetron  4 mg Intravenous Q6H PRN     oxyCODONE  5-10 mg Oral Q3H PRN     polyethylene glycol  17 g Oral Daily PRN     prochlorperazine  10 mg Intravenous Q6H PRN    Or     prochlorperazine  10 mg Oral Q6H PRN     senna-docusate  1 tablet Oral BID PRN    Or     senna-docusate  2 tablet Oral BID PRN     sodium chloride (PF)  3 mL Intracatheter Q1H PRN            Data:      Lab data  reviewed.   Recent Labs   Lab 09/25/19  0538 09/24/19  0821 09/23/19  0841  09/21/19  0917   HGB 10.5* 11.4* 10.9*   < > 11.3*   MCV 86 86 86   < > 88    356 345   < > 265   NA  --  136 132*  --  135   POTASSIUM  --  3.4 3.8  --  3.6   CHLORIDE  --  102 98  --  101   CO2  --  32 30  --  33*   BUN  --  8 10  --  16   CR  --  0.85 0.78  --  0.94   ANIONGAP  --  2* 4  --  1*   ADA  --  8.7 8.3*  --  8.0*   GLC  --  114* 113*  --  133*    < > = values in this interval not displayed.           Imaging:      Imaging data reviewed.     BRENDEN LoveO.  [place] Hospitalist  Pager [phone]

## 2019-09-25 NOTE — PROGRESS NOTES
NAYANA    I:  SW received call from Josephine, admissions at [facility], who stated they need current therapy notes to confirm admission today. NISH faxed them to: [phone]. Josephine will call SW back with decision.  Pt will require a stretcher via , per RN.     P: Continue to assist as needed.    GERALD Regan    UPDATE@1117: Pt has been accepted at [facility]. Stretcher was set up thru  for: 1500 (3pm).  PCS form completed, faxed to  and placed in front of patient's chart. Pt's own wheelchair will need to be sent via  to [facility] if family is unable to .     UPDATE@1315: SW updated patient, with  present, of discharge today to TCU, which he accepts. Pt is not sure if the wheelchair in room is his or not. SW left  for son, Soumya, asking for call back. Pt stated son would  wheelchair if it is indeed his own.     UPDATE@1324: Son returns call stating the wheelchair in patient's room at [facility] belongs to [facility]. Son was updated of discharge time/place. Neuro has placed orders; Hospitalist has been updated.Facility updated of discharge time.     (Late entry) Update at 1415: Orders, scripts and PAS were faxed to facility.  PAS-RR    D: Per DHS regulation, NISH completed and submitted PAS-RR to [place] [employer] Direct Connect via the Senior LinkAge Line.  PAS-RR confirmation # is : [id number]    I: NISH spoke with patient and they are aware a PAS-RR has been submitted.  NISH reviewed with patient that they may be contacted for a follow up appointment within 10 days of hospital discharge if their SNF stay is < 30 days.  Contact information for Senior LinkAge Line was also provided.    A: patient verbalized understanding.    P: Further questions may be directed to [facility] LinkAge Line at #[phone], option #4 for PAS-RR staff.    No further SW interventions anticipated at this time.    GERALD Regan

## 2019-09-25 NOTE — DISCHARGE SUMMARY
[facility]    Discharge Summary  Hospitalist    Date of Admission:  9/18/2019  Date of Discharge:  9/25/2019  Discharging Provider: Luisito Littlejohn  Date of Service (when I saw the patient): 09/25/19    History of Present Illness   Elaina Valenzuela is a very pleasant 58-year-old gentleman with a past medical history significant for hypertension, mild intermittent asthma, neurogenic bladder, hyperlipidemia, history of spinal meningioma, status post L4 through L5 fusion and T8 through T9 laminectomy for resection of intradural meningioma, and paraplegia, who is postoperative day 0 status post T9 through T10 bilateral laminectomy with decompression of central stenosis.  The procedure was performed by Dr. Munguia under general anesthesia with an estimated blood loss of 100 mL.  The patient has a somewhat complicated history, with prior thoracic laminectomy for meningioma.  He reports he has been unable to walk for the past 2 years and has been wheelchair-bound.  Recent imaging showed severe cord compression at T9 to T10.  He presents today for the above intervention.  The patient is presently evaluated in his room on the Neuro floor.  He reports he is doing well after surgery.  Pain is improved with recent administration of pain medications.  English is not the patient's first language, and I did offer  services for our conversation.  The patient declines this and feels comfortable communicating in English.      On exam, he is able to wiggle toes on bilateral feet minimally.  He says this is not significantly changed since prior to surgery.  He also notes bilateral lower extremity numbness from thigh down, which he says is also unchanged.  He denies any chest pain, shortness of breath, wheezing, nausea, vomiting, visual changes.  He is tolerating p.o.  He reports that his asthma symptoms wax and wane, but he currently denies any symptoms at this time.  He reports he did not take any of his  medications prior to surgery today, as advised.  He does not have a chronic Paulson catheter.  He reports he lives in a house with his wife and son.     Hospital Course   Elaina Valenzuela was admitted on 9/18/2019.  The following problems were addressed during his hospitalization:    Elaina Valenzuela is a 58 year old male who was admitted on 9/18/2019 for T9 through T10 bilateral laminectomy for decompression of central stenosis.     Laminectomy. Meningioma.   - Post op cares per Neurosurgery.    - PT/OT.    - Pain control deferred to surgery team.   - Close outpatient follow up.      HTN.  BP controlled   - metoprolol.    - Hydrochlorothiazide      Asthma.    - Continue Breo Ellipta      HL.    - Continue atorvastatin.     Depression.    - Continue Celexa.     UTI, not present on admission.    - Levaquin course.       LLL CAP, not present on admission.   - Levaquin course.      DVT Prophylaxis: Defer to Surgery team.      Pending Results   These results will be followed up by PCP  Unresulted Labs Ordered in the Past 30 Days of this Admission     Date and Time Order Name Status Description    9/20/2019 0423 Blood culture Preliminary     9/20/2019 0423 Blood culture Preliminary           Code Status   Full Code       Primary Care Physician   [facility]    Physical Exam   Temp: 98.8  F (37.1  C) Temp src: Oral BP: 122/76   Heart Rate: 94 Resp: 16 SpO2: 91 % O2 Device: None (Room air)    Vitals:    09/21/19 1228   Weight: 88.3 kg (194 lb 11.2 oz)     Vital Signs with Ranges  Temp:  [98.3  F (36.8  C)-101.7  F (38.7  C)] 98.8  F (37.1  C)  Heart Rate:  [78-94] 94  Resp:  [14-18] 16  BP: (102-132)/(64-85) 122/76  SpO2:  [90 %-94 %] 91 %  I/O last 3 completed shifts:  In: 1235 [P.O.:700; IV Piggyback:535]  Out: 575 [Urine:575]    GENERAL: Alert. NAD. Conversational, appropriate. Pleasant. Appears more comfortable and alert.   HEENT: Normocephalic. EOMI. No icterus or injection. Nares normal.   LUNGS: Clear to  auscultation. No dyspnea at rest.   HEART: Regular rate. Extremities perfused.   ABDOMEN: Soft, nontender, and nondistended. Positive bowel sounds.   EXTREMITIES: No LE edema noted. Dressing to back, c/d/i.   NEUROLOGIC: Follows commands. Oriented to person place and time.     Discharge Disposition   Discharged to TCU  Condition at discharge: Stable    Consultations This Hospital Stay   OCCUPATIONAL THERAPY ADULT IP CONSULT  PHYSICAL THERAPY ADULT IP CONSULT  HOSPITALIST IP CONSULT  PHYSICAL THERAPY ADULT IP CONSULT  OCCUPATIONAL THERAPY ADULT IP CONSULT  CARE TRANSITION RN/SW IP CONSULT    Time Spent on this Encounter   ILuisito DO, personally saw the patient today and spent greater than 30 minutes discharging this patient.    Discharge Orders      General info for SNF    Length of Stay Estimate: Short Term Care: Estimated # of Days <30  Condition at Discharge: Stable  Level of care:skilled   Rehabilitation Potential: Fair  Admission H&P remains valid and up-to-date: Yes  Recent Chemotherapy: N/A  Use Nursing Home Standing Orders: N/A     Mantoux instructions    Give two-step Mantoux (PPD) Per Facility Policy Yes     Reason for your hospital stay    Back surgery     Paulson catheter    To straight gravity drainage. Change catheter every 2 weeks and PRN for leaking or decreased uring output with signs of bladder distention. DO NOT change catheter without a specific MD order IF diagnosis of benign prostatic hypertrophy (BPH), neurogenic bladder, or other urological conditions     Wound care    Keep your incision clean and dry at all times.  OK to remove dressing on postop day 2.  OK to shower on postop day 3 and allow water to run over incision, pat dry after shower.  No bathing swimming or submerging in water.  Call immediately or come to ED if any drainage occurs, or you develop new pain, redness, or swelling.     Additional Discharge Instructions    Discharge instructions:  No lifting of more than 10  pounds, no bending, no twisting until follow up visit.    Ok to shampoo hair, shower or bathe, but, do not scrub or submerge incision under water until evaluated post-operatively in clinic.    Ok to walk as tolerated, avoid bed rest and prolonged sitting.    No contact sports until after follow up visit  No high impact activities such as; running/jogging, snowmobile or 4 carpio riding or any other recreational vehicles.    Do not take NSAIDS (ibuprofen, advil, aleve, naproxen, etc) for pain control.    Do NOT drive while taking narcotic pain medication.    Call the [facility] at [phone] for increasing redness, swelling or pus draining from the incision, increased pain or any other questions and concerns.     Activity - Up with assistive device     Activity - Up with nursing assistance     Follow Up and recommended labs and tests    Please follow up at the [facility] in 6 weeks.  Please call the clinic at [phone] to schedule your appointment.     Full Code     Physical Therapy Adult Consult    Evaluate and treat as clinically indicated.    Reason:  S/p laminectomy     Occupational Therapy Adult Consult    Evaluate and treat as clinically indicated.    Reason:  S/p laminectomy     Advance Diet as Tolerated    Follow this diet upon discharge: Orders Placed This Encounter      Room Service      Advance Diet as Tolerated: Regular Diet Adult     Discharge Medications   Current Discharge Medication List      START taking these medications    Details   levofloxacin (LEVAQUIN) 500 MG tablet Take 1 tablet (500 mg) by mouth daily for 2 days    Associated Diagnoses: S/P laminectomy      methocarbamol (ROBAXIN) 750 MG tablet Take 1 tablet (750 mg) by mouth every 6 hours as needed for muscle spasms  Qty: 50 tablet, Refills: 1    Associated Diagnoses: S/P laminectomy      morphine (MS CONTIN) 15 MG CR tablet Take 1 tablet (15 mg) by mouth every 12 hours  Qty: 50 tablet, Refills: 0    Associated  Diagnoses: S/P laminectomy      oxyCODONE (ROXICODONE) 5 MG tablet Take 1-2 tablets (5-10 mg) by mouth every 3 hours as needed for moderate to severe pain  Qty: 50 tablet, Refills: 0    Associated Diagnoses: S/P laminectomy      polyethylene glycol (MIRALAX/GLYCOLAX) packet Take 17 g by mouth daily as needed for constipation    Associated Diagnoses: S/P laminectomy      senna-docusate (SENOKOT-S/PERICOLACE) 8.6-50 MG tablet Take 2 tablets by mouth 2 times daily as needed for constipation    Associated Diagnoses: S/P laminectomy         CONTINUE these medications which have NOT CHANGED    Details   albuterol (PROAIR HFA/PROVENTIL HFA/VENTOLIN HFA) 108 (90 Base) MCG/ACT inhaler Inhale 2 puffs into the lungs every 6 hours as needed for shortness of breath / dyspnea or wheezing  Qty: 1 Inhaler, Refills: 10    Comments: Pharmacy may dispense generic or brand covered by insurance (Proair, or proventil or ventolin or generic albuterol inhaler)  Associated Diagnoses: Mild persistent asthma without complication      amitriptyline (ELAVIL) 100 MG tablet TAKE 1 TABLET BY MOUTH EVERY DAY AT BEDTIME  Qty: 90 tablet, Refills: 3    Associated Diagnoses: Chronic midline low back pain without sciatica; Major depressive disorder, recurrent episode, moderate (H)      atorvastatin (LIPITOR) 20 MG tablet Take 1 tablet (20 mg) by mouth daily  Qty: 90 tablet, Refills: 1    Associated Diagnoses: Hyperlipidemia LDL goal <100; Essential hypertension with goal blood pressure less than 140/90      BENEFIBER DRINK MIX OR POWD by mouth daily      citalopram (CELEXA) 20 MG tablet Take 1 tablet (20 mg) by mouth daily  Qty: 90 tablet, Refills: 1    Associated Diagnoses: Major depressive disorder, recurrent episode, moderate (H)      fluticasone-salmeterol (ADVAIR) 100-50 MCG/DOSE inhaler Inhale 1 puff into the lungs 2 times daily  Qty: 1 Inhaler, Refills: 3    Associated Diagnoses: Mild persistent asthma without complication      gabapentin  (NEURONTIN) 300 MG capsule Take 2 capsules (600 mg) by mouth 3 times daily  Qty: 360 capsule, Refills: 1    Associated Diagnoses: Idiopathic peripheral neuropathy      hydrochlorothiazide (HYDRODIURIL) 25 MG tablet TAKE 1 TABLET BY MOUTH EVERY MORNING. NO REFILLS UNTIL FOLLOW UP  Qty: 90 tablet, Refills: 1    Associated Diagnoses: Essential hypertension with goal blood pressure less than 140/90      metoprolol tartrate (LOPRESSOR) 100 MG tablet TAKE 1 TABLET(100 MG) BY MOUTH TWICE DAILY  Qty: 180 tablet, Refills: 2    Associated Diagnoses: Essential hypertension with goal blood pressure less than 140/90      tamsulosin (FLOMAX) 0.4 MG capsule Take 1 capsule (0.4 mg) by mouth daily  Qty: 30 capsule, Refills: 0    Associated Diagnoses: Urinary retention         STOP taking these medications       doxycycline monohydrate (MONODOX) 100 MG capsule Comments:   Reason for Stopping:         naproxen (NAPROSYN) 500 MG tablet Comments:   Reason for Stopping:             Allergies   Allergies   Allergen Reactions     Penicillins Rash     Data   Most Recent 3 CBC's:  Recent Labs   Lab Test 09/25/19  0538 09/24/19  0821 09/23/19  0841   WBC 16.9* 13.8* 11.9*   HGB 10.5* 11.4* 10.9*   MCV 86 86 86    356 345      Most Recent 3 BMP's:  Recent Labs   Lab Test 09/24/19  0821 09/23/19  0841 09/21/19  0917    132* 135   POTASSIUM 3.4 3.8 3.6   CHLORIDE 102 98 101   CO2 32 30 33*   BUN 8 10 16   CR 0.85 0.78 0.94   ANIONGAP 2* 4 1*   ADA 8.7 8.3* 8.0*   * 113* 133*     Most Recent 2 LFT's:  Recent Labs   Lab Test 05/17/19  1746 10/10/17  1116   AST 20 27   ALT 24 36   ALKPHOS 77 88   BILITOTAL 0.8 0.5     Most Recent INR's and Anticoagulation Dosing History:  Anticoagulation Dose History     There is no flowsheet data to display.        Most Recent 3 Troponin's:No lab results found.  Most Recent Cholesterol Panel:  Recent Labs   Lab Test 05/17/19  1746   CHOL 214*   *   HDL 34*   TRIG 198*     Most Recent 6  Bacteria Isolates From Any Culture (See EPIC Reports for Culture Details):  Recent Labs   Lab Test 09/20/19  0550 09/20/19  0452   CULT No growth No growth after 5 days  No growth after 5 days     Most Recent TSH, T4 and A1c Labs:  Recent Labs   Lab Test 05/17/19  1746   A1C 5.4     Results for orders placed or performed during the hospital encounter of 09/18/19   XR Surgery HANNA Fluoro L/T 5 Min w Stills    Narrative    SURGERY C-ARM FLUORO LESS THAN 5 MIN W STILLS 9/18/2019 3:50 PM     HISTORY: T9-T10 laminectomy, fluoro 7 sec, 3 images, OR 34, C-arm #1,  [id number], SB/AAG    NUMBER OF IMAGES ACQUIRED: 3    VIEWS: 2    FLUOROSCOPY TIME: 0.1 minutes.      Impression    IMPRESSION: A localization probe projects over the center of the  spinal canal at the T10-11 level on the frontal view assuming 12 rib  bearing vertebral bodies. On the lateral view, a localization probe is  seen projecting over the posterior aspect of the spinal canal at the  lower thoracic/upper lumbar level, but the exact level cannot be  determined.    SONIA BRANHAM MD   XR Chest Port 1 View    Narrative    XR CHEST PORT 1 VW   9/20/2019 4:59 AM     HISTORY: Fever, new hypoxia; assess for infiltrate.    COMPARISON: 1/19/2016.    FINDINGS: Semiupright portable chest. The heart size is normal. There  is a left basilar infiltrate. The right lung is clear. No  pneumothorax.      Impression    IMPRESSION: Left basilar atelectasis or pneumonia.    MARKUS LUCIO MD

## 2019-09-25 NOTE — PLAN OF CARE
POD 7 T9-T10 posterior spinal laminectomy. Alert and oriented x4. VSS on RA. CMS with no movement of lower extremities except weak dorsi/plantar flexion on R and able to wiggle toes on L. BLEs numb. Lung sounds clear. +BS and had large BM today. Paulson patent with adequate o/p. Incision WDL, changed dressing with neurosurgery PA. Diffuse red rash to back, washed with soap and water and medication applied per order. Incision pain managed with ms contin and prn oxycodone and robaxin. Repositioned q 2 hrs. Reviewed written discharge instructions with pt with  - all questions answered. Pt discharged to [facility] via medical transport.

## 2019-09-25 NOTE — PLAN OF CARE
POD #7 of T9-10 laminectomy with Dr. Munguia. A&Ox4. VSS on RA. Wheelchair-bound, Up with A2 lift. Turn/Repo q2hrs. Regular diet. C/o 8-10 back pain, moaning and grimacing. Managed with MS Contin scheduled, PRN Oxycodone q3hrs, Robaxin q6hrs and ice applied. Dressing CDI. BLE 1/5 strength, able to wiggle toes, numbness BLE. BUE 5/5 strength. Pulses equal, strong. Diffuse red rash on back, BRIA. Bowel sounds active, pt states constipated. IV-SL. Continue to monitor.

## 2019-09-26 ENCOUNTER — NURSING HOME VISIT (OUTPATIENT)
Dept: GERIATRICS | Facility: CLINIC | Age: 59
End: 2019-09-26
Payer: COMMERCIAL

## 2019-09-26 VITALS
RESPIRATION RATE: 18 BRPM | SYSTOLIC BLOOD PRESSURE: 125 MMHG | DIASTOLIC BLOOD PRESSURE: 78 MMHG | TEMPERATURE: 98.4 F | OXYGEN SATURATION: 90 % | HEART RATE: 82 BPM

## 2019-09-26 DIAGNOSIS — N31.9 NEUROGENIC BLADDER: ICD-10-CM

## 2019-09-26 DIAGNOSIS — E87.6 HYPOKALEMIA: ICD-10-CM

## 2019-09-26 DIAGNOSIS — D72.829 LEUKOCYTOSIS, UNSPECIFIED TYPE: ICD-10-CM

## 2019-09-26 DIAGNOSIS — D62 ANEMIA DUE TO BLOOD LOSS, ACUTE: ICD-10-CM

## 2019-09-26 DIAGNOSIS — J18.9 PNEUMONIA DUE TO INFECTIOUS ORGANISM, UNSPECIFIED LATERALITY, UNSPECIFIED PART OF LUNG: ICD-10-CM

## 2019-09-26 DIAGNOSIS — Z98.890 S/P LAMINECTOMY: ICD-10-CM

## 2019-09-26 DIAGNOSIS — J45.909 UNCOMPLICATED ASTHMA, UNSPECIFIED ASTHMA SEVERITY, UNSPECIFIED WHETHER PERSISTENT: ICD-10-CM

## 2019-09-26 DIAGNOSIS — I10 BENIGN ESSENTIAL HYPERTENSION: ICD-10-CM

## 2019-09-26 DIAGNOSIS — E78.5 HYPERLIPIDEMIA, UNSPECIFIED HYPERLIPIDEMIA TYPE: ICD-10-CM

## 2019-09-26 DIAGNOSIS — D32.1 SPINAL MENINGIOMA (H): Primary | ICD-10-CM

## 2019-09-26 LAB
BACTERIA SPEC CULT: NO GROWTH
BACTERIA SPEC CULT: NO GROWTH
Lab: NORMAL
Lab: NORMAL
SPECIMEN SOURCE: NORMAL
SPECIMEN SOURCE: NORMAL

## 2019-09-26 PROCEDURE — 99309 SBSQ NF CARE MODERATE MDM 30: CPT | Performed by: NURSE PRACTITIONER

## 2019-09-26 PROCEDURE — 99207 ZZC CDG-MDM COMPONENT: MEETS LOW - DOWN CODED: CPT | Performed by: NURSE PRACTITIONER

## 2019-09-26 RX ORDER — DOXYCYCLINE 100 MG/1
100 CAPSULE ORAL 2 TIMES DAILY
COMMUNITY
End: 2019-09-27

## 2019-09-26 NOTE — LETTER
"    9/26/2019        RE: Elaina Valenzuela  [address]  [place] [address]        [facility] [facility]  PRIMARY CARE PROVIDER AND CLINIC:  [facility], [address] / [place] [address]  Chief Complaint   Patient presents with     Hospital F/U     [facility] Record Number:  [id number]  Place of Service where encounter took place:  [facility] (Critical access hospital) [[id number]]    Elaina Valenzuela  is a 58 year old  (1960), admitted to the above facility from  [facility]. Hospital stay 9/18/19 through 9/25/19..  Admitted to this facility for  rehab, medical management and nursing care.    HPI:    HPI information obtained from: facility chart records, facility staff, patient report, [facility] chart review and Care Everywhere [facility] chart review.     Brief Summary of Hospital Course:     This is a 58-year-old male, with a past medical history significant for hypertension, hyperlipidemia, asthma, neurogenic bladder and history of spinal meningioma s/p L4-L5 Fusion and T8-T9 Laminectomy in 2010 with progressive weakness, who was admitted to [facility] for T9-10 Bilateral Laminectomy for decompression of central stenosis on 9/18/19 by Dr. Munguia. Developed temperature of 101.1 F on 9/20/19 with WBC 15.7 and Procalcitonin 0.08.. A chest x-ray revealed \"left basilar pneumonia versus atelectasis\". Antibiotics were initiated. Concern also for UTI with alvarado catheter in place, but urine culture revealed no growth. Hydrochlorothiazide was held during hospitalization. Hemoglobin 11.6 on 9/19/19 -> 10.5 on 9/25/19. A TCU stay was recommended for ongoing physical rehabilitation.     Updates on Status Since Skilled nursing Admission:     Continues to have pain in the back. Improved with pain medication. Has numbness in bilateral legs which was present prior to surgery. Denies shortness of breath or chest pain.     CODE STATUS/ADVANCE DIRECTIVES " DISCUSSION:   CPR/Full code   Patient's living condition: lives with spouse and son  ALLERGIES: Penicillins  PAST MEDICAL HISTORY:  has a past medical history of Chronic back pain, HTN (hypertension), Left leg weakness, Mild intermittent asthma, and Neurogenic bladder.  PAST SURGICAL HISTORY:   has a past surgical history that includes NONSPECIFIC PROCEDURE; laminect/discectomy, lumbar (3/19/2010); surgical history of -  (3/23/2010); colonoscopy (5/2016); Colonoscopy (N/A, 5/19/2016); Colonoscopy with CO2 insufflation (N/A, 5/19/2016); and Laminectomy thoracic one level (N/A, 9/18/2019).  FAMILY HISTORY: family history includes Hypertension in his mother.  SOCIAL HISTORY:   reports that he has never smoked. He has never used smokeless tobacco. He reports current alcohol use. He reports that he does not use drugs.    Post Discharge Medication Reconciliation Status: discharge medications reconciled, continue medications without change    Current Outpatient Medications   Medication Sig Dispense Refill     albuterol (PROAIR HFA/PROVENTIL HFA/VENTOLIN HFA) 108 (90 Base) MCG/ACT inhaler Inhale 2 puffs into the lungs every 6 hours as needed for shortness of breath / dyspnea or wheezing 1 Inhaler 10     amitriptyline (ELAVIL) 100 MG tablet TAKE 1 TABLET BY MOUTH EVERY DAY AT BEDTIME 90 tablet 3     atorvastatin (LIPITOR) 20 MG tablet Take 1 tablet (20 mg) by mouth daily 90 tablet 1     BENEFIBER DRINK MIX OR POWD by mouth daily       citalopram (CELEXA) 20 MG tablet Take 1 tablet (20 mg) by mouth daily 90 tablet 1     fluticasone-salmeterol (ADVAIR) 100-50 MCG/DOSE inhaler Inhale 1 puff into the lungs 2 times daily 1 Inhaler 3     gabapentin (NEURONTIN) 300 MG capsule Take 2 capsules (600 mg) by mouth 3 times daily 360 capsule 1     hydrochlorothiazide (HYDRODIURIL) 25 MG tablet TAKE 1 TABLET BY MOUTH EVERY MORNING. NO REFILLS UNTIL FOLLOW UP 90 tablet 1     levofloxacin (LEVAQUIN) 500 MG tablet Take 1 tablet (500 mg) by  mouth daily for 2 days       methocarbamol (ROBAXIN) 750 MG tablet Take 1 tablet (750 mg) by mouth every 6 hours as needed for muscle spasms 50 tablet 1     metoprolol tartrate (LOPRESSOR) 100 MG tablet TAKE 1 TABLET(100 MG) BY MOUTH TWICE DAILY 180 tablet 2     morphine (MS CONTIN) 15 MG CR tablet Take 1 tablet (15 mg) by mouth every 12 hours 50 tablet 0     oxyCODONE (ROXICODONE) 5 MG tablet Take 1-2 tablets (5-10 mg) by mouth every 3 hours as needed for moderate to severe pain 50 tablet 0     polyethylene glycol (MIRALAX/GLYCOLAX) packet Take 17 g by mouth daily as needed for constipation       senna-docusate (SENOKOT-S/PERICOLACE) 8.6-50 MG tablet Take 2 tablets by mouth 2 times daily as needed for constipation       tamsulosin (FLOMAX) 0.4 MG capsule Take 1 capsule (0.4 mg) by mouth daily 30 capsule 0     ROS:  10 point ROS of systems including Constitutional, Eyes, Respiratory, Cardiovascular, Gastroenterology, Genitourinary, Integumentary, Musculoskeletal, Psychiatric were all negative except for pertinent positives noted in my HPI.    Vitals:  /78   Pulse 82   Temp 98.4  F (36.9  C)   Resp 18   SpO2 90%   Exam:  GENERAL APPEARANCE:  Alert, in no distress  ENT:  Mouth and posterior oropharynx normal, moist mucous membranes  EYES:  EOM, conjunctivae, lids, pupils and irises normal  RESP:  respiratory effort and palpation of chest normal, lungs clear to auscultation , no respiratory distress, in bilateral anterior lobes  CV:  Palpation and auscultation of heart done , regular rate and rhythm, no murmur, rub, or gallop  ABDOMEN:  normal bowel sounds, soft, nontender, no hepatosplenomegaly or other masses  M/S:   Trace edema noted in BLE. Does not want to move in bed as it is painful.  SKIN:  Did not visualize surgical incision  NEURO:   Cranial nerves 2-12 are normal tested and grossly at patient's baseline  PSYCH:  oriented to person    Lab/Diagnostic data:  Labs done in SNF are in [facility].  "Please refer to them using [facility] Everywhere.    ASSESSMENT/PLAN:  History of Spinal Meningioma s/p L4-L5 Fusion and T8-T9 Laminectomy in 2010 with Progressive Weakness/T9-10 Bilateral Laminectomy for Decompression of Central Stenosis on 9/18/19 by Dr. Munguia. Follow-up with Juliana Cardenas PA-C, on 10/31/19 as scheduled. MS Contin, Oxycodone and Methocarbamol available for pain control. Also on Gabapentin and Amitriptyline for numbness. On no DVT prophylaxis. Will have staff check with [facility] to ensure this is intended. Physical and Occupational Therapy ordered for deconditioning.     Pneumonia with History of Asthma. Seen in [facility] ED on 9/13/19 for breathing problems and back pain. A chest x-ray revealed \"Mild hypoventilated lungs\". Treated with a Medrol dose pack as per instructions and Doxycycline 9/13/19 through 9/20/19 due to concern for COPD exacerbation. Was also given a spacer for Albuterol inhaler. During hospitalization, developed fever with chest x-ray concerned for pneumonia as noted above. Continue Levofloxacin until 9/27/19 as ordered.  Also taking Albuterol and Fluticasone-Salmeterol as ordered.    Anemia due to Blood Loss, Acute. Secondary to above. Hemoglobin 11.6 on 9/19/19 -> 10.5 on 9/25/19. Repeat CBC on 9/30/19 to ensure stability.     Leukocytosis. WBC trending up since 9/23/19. May be secondary to above. Was also recently on a steroid. Afebrile. Will repeat CBC with differential on 9/30/19 to ensure downward trend.    Hypokalemia. Potassium 3.4 on 9/24/19. Repeat BMP on 9/30/19 to ensure WNL.     Neurogenic Bladder. Paulson catheter placed during hospitalization and not removed at discharge. Uncertain how mobile patient has been since TCU admission. Will attempt voiding trials on 9/30/19 if patient is agreeable. Also onTamsulosin. PCP recommended patient see Urology in the past.     Hypertension/Hyperlipidemia. Monitor blood pressure daily. Hydrochlorothiazide held during hospitalization " for unclear reasons. Continue Metoprolol, Hydrochlorothiazide and Atorvastatin as ordered.    Depression. Continue Citalopram as ordered.    Constipation. Continue Senna-S and Miralax as ordered. Also on Benefiber. Last BM 9/25/19. Monitor closely to determine if medications should be scheduled.    {Total time spent with patient visit at the skilled nursing facility was 35 min including patient visit and review of past records. Greater than 50% of total time spent with counseling and coordinating care due to coordinating care with nurse on admission orders, coordinating care with follow up labs and appointments and counseling patient/resident for 10 minutes on: the reason for their hospitalization and what the treatment is, the plan of SNF stay and projected length of stay and options of code status and their current code status order.    Electronically signed by:  AMIE Mondragon CNP                      Sincerely,        AMIE Mondragon CNP

## 2019-09-26 NOTE — PROGRESS NOTES
"[place] [facility]  PRIMARY CARE PROVIDER AND CLINIC:  [facility], [address] / [place] [address]  Chief Complaint   Patient presents with     Hospital F/U     [place] Medical Record Number:  [id number]  Place of Service where encounter took place:  [facility] [[id number]]    Elaina Valenzuela  is a 58 year old  (1960), admitted to the above facility from  [facility]. Hospital stay 9/18/19 through 9/25/19..  Admitted to this facility for  rehab, medical management and nursing care.    HPI:    HPI information obtained from: facility chart records, facility staff, patient report, [facility] chart review and Care Everywhere [facility] chart review.     Brief Summary of Hospital Course:     This is a 58-year-old male, with a past medical history significant for hypertension, hyperlipidemia, asthma, neurogenic bladder and history of spinal meningioma s/p L4-L5 Fusion and T8-T9 Laminectomy in 2010 with progressive weakness, who was admitted to [facility] for T9-10 Bilateral Laminectomy for decompression of central stenosis on 9/18/19 by Dr. Munguia. Developed temperature of 101.1 F on 9/20/19 with WBC 15.7 and Procalcitonin 0.08.. A chest x-ray revealed \"left basilar pneumonia versus atelectasis\". Antibiotics were initiated. Concern also for UTI with alvarado catheter in place, but urine culture revealed no growth. Hydrochlorothiazide was held during hospitalization. Hemoglobin 11.6 on 9/19/19 -> 10.5 on 9/25/19. A TCU stay was recommended for ongoing physical rehabilitation.     Updates on Status Since Skilled nursing Admission:     Continues to have pain in the back. Improved with pain medication. Has numbness in bilateral legs which was present prior to surgery. Denies shortness of breath or chest pain.     CODE STATUS/ADVANCE DIRECTIVES DISCUSSION:   CPR/Full code   Patient's living condition: lives with spouse and " son  ALLERGIES: Penicillins  PAST MEDICAL HISTORY:  has a past medical history of Chronic back pain, HTN (hypertension), Left leg weakness, Mild intermittent asthma, and Neurogenic bladder.  PAST SURGICAL HISTORY:   has a past surgical history that includes NONSPECIFIC PROCEDURE; laminect/discectomy, lumbar (3/19/2010); surgical history of -  (3/23/2010); colonoscopy (5/2016); Colonoscopy (N/A, 5/19/2016); Colonoscopy with CO2 insufflation (N/A, 5/19/2016); and Laminectomy thoracic one level (N/A, 9/18/2019).  FAMILY HISTORY: family history includes Hypertension in his mother.  SOCIAL HISTORY:   reports that he has never smoked. He has never used smokeless tobacco. He reports current alcohol use. He reports that he does not use drugs.    Post Discharge Medication Reconciliation Status: discharge medications reconciled, continue medications without change    Current Outpatient Medications   Medication Sig Dispense Refill     albuterol (PROAIR HFA/PROVENTIL HFA/VENTOLIN HFA) 108 (90 Base) MCG/ACT inhaler Inhale 2 puffs into the lungs every 6 hours as needed for shortness of breath / dyspnea or wheezing 1 Inhaler 10     amitriptyline (ELAVIL) 100 MG tablet TAKE 1 TABLET BY MOUTH EVERY DAY AT BEDTIME 90 tablet 3     atorvastatin (LIPITOR) 20 MG tablet Take 1 tablet (20 mg) by mouth daily 90 tablet 1     BENEFIBER DRINK MIX OR POWD by mouth daily       citalopram (CELEXA) 20 MG tablet Take 1 tablet (20 mg) by mouth daily 90 tablet 1     fluticasone-salmeterol (ADVAIR) 100-50 MCG/DOSE inhaler Inhale 1 puff into the lungs 2 times daily 1 Inhaler 3     gabapentin (NEURONTIN) 300 MG capsule Take 2 capsules (600 mg) by mouth 3 times daily 360 capsule 1     hydrochlorothiazide (HYDRODIURIL) 25 MG tablet TAKE 1 TABLET BY MOUTH EVERY MORNING. NO REFILLS UNTIL FOLLOW UP 90 tablet 1     levofloxacin (LEVAQUIN) 500 MG tablet Take 1 tablet (500 mg) by mouth daily for 2 days       methocarbamol (ROBAXIN) 750 MG tablet Take 1 tablet  (750 mg) by mouth every 6 hours as needed for muscle spasms 50 tablet 1     metoprolol tartrate (LOPRESSOR) 100 MG tablet TAKE 1 TABLET(100 MG) BY MOUTH TWICE DAILY 180 tablet 2     morphine (MS CONTIN) 15 MG CR tablet Take 1 tablet (15 mg) by mouth every 12 hours 50 tablet 0     oxyCODONE (ROXICODONE) 5 MG tablet Take 1-2 tablets (5-10 mg) by mouth every 3 hours as needed for moderate to severe pain 50 tablet 0     polyethylene glycol (MIRALAX/GLYCOLAX) packet Take 17 g by mouth daily as needed for constipation       senna-docusate (SENOKOT-S/PERICOLACE) 8.6-50 MG tablet Take 1 tablet by mouth 2 times daily       tamsulosin (FLOMAX) 0.4 MG capsule Take 1 capsule (0.4 mg) by mouth daily 30 capsule 0     ROS:  10 point ROS of systems including Constitutional, Eyes, Respiratory, Cardiovascular, Gastroenterology, Genitourinary, Integumentary, Musculoskeletal, Psychiatric were all negative except for pertinent positives noted in my HPI.    Vitals:  /78   Pulse 82   Temp 98.4  F (36.9  C)   Resp 18   SpO2 90%   Exam:  GENERAL APPEARANCE:  Alert, in no distress  ENT:  Mouth and posterior oropharynx normal, moist mucous membranes  EYES:  EOM, conjunctivae, lids, pupils and irises normal  RESP:  respiratory effort and palpation of chest normal, lungs clear to auscultation , no respiratory distress, in bilateral anterior lobes  CV:  Palpation and auscultation of heart done , regular rate and rhythm, no murmur, rub, or gallop  ABDOMEN:  normal bowel sounds, soft, nontender, no hepatosplenomegaly or other masses  M/S:   Trace edema noted in BLE. Does not want to move in bed as it is painful.  SKIN:  Did not visualize surgical incision  NEURO:   Cranial nerves 2-12 are normal tested and grossly at patient's baseline  PSYCH:  oriented to person    Lab/Diagnostic data:  Labs done in SNF are in [facility]. Please refer to them using [facility] Everywhere.    ASSESSMENT/PLAN:  History of Spinal Meningioma s/p L4-L5  "Fusion and T8-T9 Laminectomy in 2010 with Progressive Weakness/T9-10 Bilateral Laminectomy for Decompression of Central Stenosis on 9/18/19 by Dr. Munguia. Follow-up with Juliana Cardenas PA-C, on 10/31/19 as scheduled. MS Contin, Oxycodone and Methocarbamol available for pain control. Also on Gabapentin and Amitriptyline for numbness. On no DVT prophylaxis. Will have staff check with [facility] to ensure this is intended. Physical and Occupational Therapy ordered for deconditioning.     Pneumonia with History of Asthma. Seen in [facility] ED on 9/13/19 for breathing problems and back pain. A chest x-ray revealed \"Mild hypoventilated lungs\". Treated with a Medrol dose pack as per instructions and Doxycycline 9/13/19 through 9/20/19 due to concern for COPD exacerbation. Was also given a spacer for Albuterol inhaler. During hospitalization, developed fever with chest x-ray concerned for pneumonia as noted above. Continue Levofloxacin until 9/27/19 as ordered.  Also taking Albuterol and Fluticasone-Salmeterol as ordered.    Anemia due to Blood Loss, Acute. Secondary to above. Hemoglobin 11.6 on 9/19/19 -> 10.5 on 9/25/19. Repeat CBC on 9/30/19 to ensure stability.     Leukocytosis. WBC trending up since 9/23/19. May be secondary to above. Was also recently on a steroid. Afebrile. Will repeat CBC with differential on 9/30/19 to ensure downward trend.    Hypokalemia. Potassium 3.4 on 9/24/19. Repeat BMP on 9/30/19 to ensure WNL.     Neurogenic Bladder. Paulson catheter placed during hospitalization and not removed at discharge. Uncertain how mobile patient has been since TCU admission. Will attempt voiding trials on 9/30/19 if patient is agreeable. Also onTamsulosin. PCP recommended patient see Urology in the past.     Hypertension/Hyperlipidemia. Monitor blood pressure daily. Hydrochlorothiazide held during hospitalization for unclear reasons. Continue Metoprolol, Hydrochlorothiazide and Atorvastatin as ordered.    Depression. " Continue Citalopram as ordered.    Constipation. Continue Miralax as ordered. Also on Benefiber. Last BM 9/25/19. Will change Senna-S from PRN to scheduled.    {Total time spent with patient visit at the skilled nursing facility was 35 min including patient visit and review of past records. Greater than 50% of total time spent with counseling and coordinating care due to coordinating care with nurse on admission orders, coordinating care with follow up labs and appointments and counseling patient/resident for 10 minutes on: the reason for their hospitalization and what the treatment is, the plan of SNF stay and projected length of stay and options of code status and their current code status order.    Electronically signed by:  AMIE Mondragon CNP

## 2019-09-27 ENCOUNTER — TELEPHONE (OUTPATIENT)
Dept: GERIATRICS | Facility: CLINIC | Age: 59
End: 2019-09-27

## 2019-09-27 ENCOUNTER — RECORDS - HEALTHEAST (OUTPATIENT)
Dept: LAB | Facility: CLINIC | Age: 59
End: 2019-09-27

## 2019-09-27 ENCOUNTER — TRANSFERRED RECORDS (OUTPATIENT)
Dept: HEALTH INFORMATION MANAGEMENT | Facility: CLINIC | Age: 59
End: 2019-09-27

## 2019-09-27 LAB
BASOPHILS # BLD AUTO: 0.1 THOU/UL (ref 0–0.2)
BASOPHILS NFR BLD AUTO: 0 % (ref 0–2)
DIFFERENTIAL: ABNORMAL
EOSINOPHIL # BLD AUTO: 1.7 THOU/UL (ref 0–0.4)
EOSINOPHIL NFR BLD AUTO: 11 % (ref 0–6)
ERYTHROCYTE [DISTWIDTH] IN BLOOD BY AUTOMATED COUNT: 15 % (ref 11–14.5)
ERYTHROCYTE [DISTWIDTH] IN BLOOD BY AUTOMATED COUNT: 15 % (ref 11–14.5)
HCT VFR BLD AUTO: 39.3 % (ref 40–54)
HCT VFR BLD AUTO: 39.3 % (ref 40–54)
HEMOGLOBIN: 12.4 G/DL (ref 14–18)
HGB BLD-MCNC: 12.4 G/DL (ref 14–18)
LYMPHOCYTES # BLD AUTO: 2.5 THOU/UL (ref 0.8–4.4)
LYMPHOCYTES NFR BLD AUTO: 16 % (ref 20–40)
MCH RBC QN AUTO: 27.9 PG (ref 27–34)
MCH RBC QN AUTO: 27.9 PG (ref 27–34)
MCHC RBC AUTO-ENTMCNC: 31.6 G/DL (ref 32–36)
MCHC RBC AUTO-ENTMCNC: 31.6 G/DL (ref 32–36)
MCV RBC AUTO: 88 FL (ref 80–100)
MCV RBC AUTO: 88 FL (ref 80–100)
MONOCYTES # BLD AUTO: 1.1 THOU/UL (ref 0–0.9)
MONOCYTES NFR BLD AUTO: 7 % (ref 2–10)
NEUTROPHILS # BLD AUTO: 10 THOU/UL (ref 2–7.7)
NEUTROPHILS NFR BLD AUTO: 65 % (ref 50–70)
PLATELET # BLD AUTO: 454 THOU/UL (ref 140–440)
PLATELET # BLD AUTO: 454 THOU/UL (ref 140–440)
PMV BLD AUTO: 9.4 FL (ref 8.5–12.5)
RBC # BLD AUTO: 4.45 MILL/UL (ref 4.4–6.2)
RBC # BLD AUTO: 4.45 MILL/UL (ref 4.4–6.2)
WBC # BLD AUTO: 15.6 THOU/UL (ref 4–11)
WBC: 15.6 THOU/UL (ref 4–11)

## 2019-09-27 RX ORDER — MORPHINE SULFATE 15 MG/1
15 TABLET, FILM COATED, EXTENDED RELEASE ORAL EVERY 12 HOURS
Qty: 50 TABLET | Refills: 0 | Status: SHIPPED | OUTPATIENT
Start: 2019-09-27 | End: 2019-10-22

## 2019-09-27 RX ORDER — OXYCODONE HYDROCHLORIDE 5 MG/1
5-10 TABLET ORAL
Qty: 50 TABLET | Refills: 0 | Status: SHIPPED | OUTPATIENT
Start: 2019-09-27 | End: 2019-12-03

## 2019-09-27 RX ORDER — AMOXICILLIN 250 MG
1 CAPSULE ORAL 2 TIMES DAILY
Start: 2019-09-27 | End: 2019-10-25

## 2019-09-28 ENCOUNTER — TELEPHONE (OUTPATIENT)
Dept: GERIATRICS | Facility: CLINIC | Age: 59
End: 2019-09-28

## 2019-09-28 ENCOUNTER — TRANSFERRED RECORDS (OUTPATIENT)
Dept: HEALTH INFORMATION MANAGEMENT | Facility: CLINIC | Age: 59
End: 2019-09-28

## 2019-09-28 ENCOUNTER — RECORDS - HEALTHEAST (OUTPATIENT)
Dept: LAB | Facility: CLINIC | Age: 59
End: 2019-09-28

## 2019-09-28 LAB
ALBUMIN UR-MCNC: ABNORMAL MG/DL
AMORPH CRY #/AREA URNS HPF: ABNORMAL /[HPF]
APPEARANCE UR: ABNORMAL
BACTERIA #/AREA URNS HPF: ABNORMAL HPF
BILIRUB UR QL STRIP: NEGATIVE
COLOR UR AUTO: YELLOW
GLUCOSE UR STRIP-MCNC: NEGATIVE MG/DL
HGB UR QL STRIP: ABNORMAL
KETONES UR STRIP-MCNC: NEGATIVE MG/DL
LEUKOCYTE ESTERASE UR QL STRIP: NEGATIVE
MUCOUS THREADS #/AREA URNS LPF: ABNORMAL LPF
NITRATE UR QL: NEGATIVE
PH UR STRIP: 5.5 [PH] (ref 4.5–8)
RBC #/AREA URNS AUTO: ABNORMAL HPF
SP GR UR STRIP: 1.02 (ref 1–1.03)
SQUAMOUS #/AREA URNS AUTO: ABNORMAL LPF
UROBILINOGEN UR STRIP-ACNC: ABNORMAL
WBC #/AREA URNS AUTO: ABNORMAL HPF

## 2019-09-28 NOTE — TELEPHONE ENCOUNTER
Received a call again today at 6:30 PM from TCU the patient continues to have spikes of fever.  Per RN, patient now has a temperature of 102  F and he feels hot and cold.  There is no apparent source of infection, per RN assessment.  Given recent history of back surgery as well as having an indwelling Paulson catheter, the patient should be evaluated by a physician.  I asked RN to send the patient to the ED    SUSY San MD

## 2019-09-28 NOTE — TELEPHONE ENCOUNTER
Received call from RN that CBC result which was ordered earlier for low-grade fever, is now available and shows white count of 15.6.  Notably it was 16.9 on September 25.  The patient has a Paulson catheter with daniella-colored urine.  RN is not concerned about surgical incision infection.    Plan:  Monitor fever curve  Monitor for any signs or symptoms of surgical wound infection  If patient continues to have fever, urine needs to be collected for analysis to rule out infection.    SUSY San MD

## 2019-09-29 NOTE — TELEPHONE ENCOUNTER
Received a call again by RN that temperature is lower now.  He is tachycardic with a heart rate of 107 but blood pressure is stable at 131/83.  He is saturating somewhere between 88 and 90% RN suggested to get two-view chest x-ray.  I will also check UA/UC.  Still if the patient developed  high-grade fever or other signs /symptoms of infection, he will be better served to be evaluated in the emergency department.  For now we will continue to monitor him in TCU.      SUSY San MD

## 2019-09-30 ENCOUNTER — NURSING HOME VISIT (OUTPATIENT)
Dept: GERIATRICS | Facility: CLINIC | Age: 59
End: 2019-09-30
Payer: COMMERCIAL

## 2019-09-30 ENCOUNTER — TRANSFERRED RECORDS (OUTPATIENT)
Dept: HEALTH INFORMATION MANAGEMENT | Facility: CLINIC | Age: 59
End: 2019-09-30

## 2019-09-30 ENCOUNTER — RECORDS - HEALTHEAST (OUTPATIENT)
Dept: LAB | Facility: CLINIC | Age: 59
End: 2019-09-30

## 2019-09-30 VITALS
OXYGEN SATURATION: 90 % | SYSTOLIC BLOOD PRESSURE: 117 MMHG | HEART RATE: 89 BPM | TEMPERATURE: 98.1 F | DIASTOLIC BLOOD PRESSURE: 79 MMHG | RESPIRATION RATE: 14 BRPM

## 2019-09-30 DIAGNOSIS — N31.9 NEUROGENIC BLADDER: ICD-10-CM

## 2019-09-30 DIAGNOSIS — E87.1 HYPONATREMIA: ICD-10-CM

## 2019-09-30 DIAGNOSIS — D72.829 LEUKOCYTOSIS, UNSPECIFIED TYPE: ICD-10-CM

## 2019-09-30 DIAGNOSIS — Z98.890 S/P LAMINECTOMY: ICD-10-CM

## 2019-09-30 DIAGNOSIS — R50.9 ELEVATED TEMPERATURE: Primary | ICD-10-CM

## 2019-09-30 LAB
ANION GAP SERPL CALCULATED.3IONS-SCNC: 11 MMOL/L (ref 5–18)
ANION GAP SERPL CALCULATED.3IONS-SCNC: 11 MMOL/L (ref 5–18)
BACTERIA SPEC CULT: NO GROWTH
BUN SERPL-MCNC: 11 MG/DL (ref 8–22)
BUN SERPL-MCNC: 11 MG/DL (ref 8–22)
CALCIUM SERPL-MCNC: 8.5 MG/DL (ref 8.5–10.5)
CALCIUM SERPL-MCNC: 8.5 MG/DL (ref 8.5–10.5)
CHLORIDE BLD-SCNC: 94 MMOL/L (ref 98–107)
CHLORIDE SERPLBLD-SCNC: 94 MMOL/L (ref 98–107)
CO2 SERPL-SCNC: 28 MMOL/L (ref 22–31)
CO2 SERPL-SCNC: 28 MMOL/L (ref 22–31)
CREAT SERPL-MCNC: 0.88 MG/DL (ref 0.7–1.3)
CREAT SERPL-MCNC: 0.88 MG/DL (ref 0.7–1.3)
ERYTHROCYTE [DISTWIDTH] IN BLOOD BY AUTOMATED COUNT: 15.2 % (ref 11–14.5)
ERYTHROCYTE [DISTWIDTH] IN BLOOD BY AUTOMATED COUNT: 15.2 % (ref 11–14.5)
GFR SERPL CREATININE-BSD FRML MDRD: >60 ML/MIN/1.73M2
GFR SERPL CREATININE-BSD FRML MDRD: >60 ML/MIN/1.73M2
GLUCOSE BLD-MCNC: 94 MG/DL (ref 70–125)
GLUCOSE SERPL-MCNC: 94 MG/DL (ref 70–125)
HCT VFR BLD AUTO: 37.8 % (ref 40–54)
HCT VFR BLD AUTO: 37.8 % (ref 40–54)
HEMOGLOBIN: 11.6 G/DL (ref 14–18)
HGB BLD-MCNC: 11.6 G/DL (ref 14–18)
MCH RBC QN AUTO: 27.5 PG (ref 27–34)
MCH RBC QN AUTO: 27.5 PG (ref 27–34)
MCHC RBC AUTO-ENTMCNC: 30.7 G/DL (ref 32–36)
MCHC RBC AUTO-ENTMCNC: 30.7 G/DL (ref 32–36)
MCV RBC AUTO: 90 FL (ref 80–100)
MCV RBC AUTO: 90 FL (ref 80–100)
PLATELET # BLD AUTO: 376 THOU/UL (ref 140–440)
PLATELET # BLD AUTO: 376 THOU/UL (ref 140–440)
PMV BLD AUTO: 9.4 FL (ref 8.5–12.5)
POTASSIUM BLD-SCNC: 3.6 MMOL/L (ref 3.5–5)
POTASSIUM SERPL-SCNC: 3.6 MMOL/L (ref 3.5–5)
RBC # BLD AUTO: 4.22 MILL/UL (ref 4.4–6.2)
RBC # BLD AUTO: 4.22 MILL/UL (ref 4.4–6.2)
SODIUM SERPL-SCNC: 133 MMOL/L (ref 136–145)
SODIUM SERPL-SCNC: 133 MMOL/L (ref 136–145)
WBC # BLD AUTO: 8.8 THOU/UL (ref 4–11)
WBC: 8.8 THOU/UL (ref 4–11)

## 2019-09-30 PROCEDURE — 99309 SBSQ NF CARE MODERATE MDM 30: CPT | Performed by: NURSE PRACTITIONER

## 2019-09-30 RX ORDER — ACETAMINOPHEN 325 MG/1
650 TABLET ORAL EVERY 4 HOURS PRN
COMMUNITY
End: 2019-11-04

## 2019-09-30 NOTE — PROGRESS NOTES
"[place] [facility]  [place] Medical Record Number:  [id number]  Place of Service where encounter took place:  [facility] ([facility]) [[id number]]  Chief Complaint   Patient presents with     Nursing Home Acute     HPI:    Elaina Valenzuela  is a 58 year old (1960), who is being seen today for an episodic care visit.  HPI information obtained from: facility chart records, facility staff, patient report and [facility] chart review. Today's concern is:    Elevated Temperature. On-call NP updated on 9/27/19 regarding a tympanic temperature 100.2 and 101.2 with SpO2 88-93% with patient complaint of feeling cold. Ordered STAT CBC, Incentive Spirometer, DuoNebs and Acetaminophen PRN. Labs revealed WBC 15.6, down from 16.9 on 9/25/19. Incision clean, dry and intact. Urine daniella in color. On 9/28/19, lower temperature, but heart rate 107 with SpO2 88-90%. Ordered chest x-ray and urine culture. Later, on-call MD updated with temperature 102 F, feeling hot and cold. Recommended transfer to ED for further evaluation, but facility requested patient remain at facility. Chest x-ray revealed \"low lung volumes\" with no acute findings. Urine culture with no growth. Last day of Levofloxacin 9/27/19 for pneumonia. Today, patient's temperature 98.1 F. Per patient report, has no concerns. Has been using the incentive spirometer regularly.     Neurogenic Bladder. Alvarado catheter placed during hospitalization. Today, discussed with patient removal of alvarado catheter. Patient reports he is concerned he will not be able to get to the bathroom on time.     Leukocytosis. WBC 8.8 on 9/30/19. Previous WBC 15.6 on 9/27/19.    Hyponatremia. Sodium 133 on 9/30/19. Previous Sodium 136 on 9/24/19.     History of Spinal Meningioma s/p L4-L5 Fusion and T8-T9 Laminectomy in 2010 with Progressive Weakness/T9-10 Bilateral Laminectomy for Decompression of Central Stenosis on 9/18/19 by Dr. Munguia. Per patient report, feels pain " is managed with pain medications. Continue to work with Physical and Occupational Therapy. Requiring EZ stand for transfers. Per staff report, had pain over the weekend, but is doing better today. Upon review of documentation, utilizing Oxycodone 2-5 times per day since TCU admission.     Past Medical and Surgical History reviewed in Epic today.    MEDICATIONS:  Current Outpatient Medications   Medication Sig Dispense Refill     acetaminophen (TYLENOL) 325 MG tablet Take 650 mg by mouth every 4 hours as needed for mild pain       albuterol (PROAIR HFA/PROVENTIL HFA/VENTOLIN HFA) 108 (90 Base) MCG/ACT inhaler Inhale 2 puffs into the lungs every 6 hours as needed for shortness of breath / dyspnea or wheezing 1 Inhaler 10     amitriptyline (ELAVIL) 100 MG tablet TAKE 1 TABLET BY MOUTH EVERY DAY AT BEDTIME 90 tablet 3     atorvastatin (LIPITOR) 20 MG tablet Take 1 tablet (20 mg) by mouth daily 90 tablet 1     BENEFIBER DRINK MIX OR POWD by mouth daily       citalopram (CELEXA) 20 MG tablet Take 1 tablet (20 mg) by mouth daily 90 tablet 1     fluticasone-salmeterol (ADVAIR) 100-50 MCG/DOSE inhaler Inhale 1 puff into the lungs 2 times daily 1 Inhaler 3     gabapentin (NEURONTIN) 300 MG capsule Take 2 capsules (600 mg) by mouth 3 times daily 360 capsule 1     hydrochlorothiazide (HYDRODIURIL) 25 MG tablet TAKE 1 TABLET BY MOUTH EVERY MORNING. NO REFILLS UNTIL FOLLOW UP 90 tablet 1     ipratropium - albuterol 0.5 mg/2.5 mg/3 mL (DUONEB) 0.5-2.5 (3) MG/3ML neb solution Take 1 vial by nebulization 4 times daily       methocarbamol (ROBAXIN) 750 MG tablet Take 1 tablet (750 mg) by mouth every 6 hours as needed for muscle spasms 50 tablet 1     metoprolol tartrate (LOPRESSOR) 100 MG tablet TAKE 1 TABLET(100 MG) BY MOUTH TWICE DAILY 180 tablet 2     morphine (MS CONTIN) 15 MG CR tablet Take 1 tablet (15 mg) by mouth every 12 hours 50 tablet 0     oxyCODONE (ROXICODONE) 5 MG tablet Take 1-2 tablets (5-10 mg) by mouth every 3  hours as needed for moderate to severe pain 50 tablet 0     polyethylene glycol (MIRALAX/GLYCOLAX) packet Take 17 g by mouth daily as needed for constipation       senna-docusate (SENOKOT-S/PERICOLACE) 8.6-50 MG tablet Take 1 tablet by mouth 2 times daily       tamsulosin (FLOMAX) 0.4 MG capsule Take 1 capsule (0.4 mg) by mouth daily 30 capsule 0     REVIEW OF SYSTEMS:  4 point ROS including Respiratory, CV, GI and , other than that noted in the HPI,  is negative    Objective:  /79   Pulse 89   Temp 98.1  F (36.7  C)   Resp 14   SpO2 90%   Exam:  GENERAL APPEARANCE:  Alert, in no distress  ENT:  Mouth and posterior oropharynx normal, moist mucous membranes  EYES:  EOM, conjunctivae, lids, pupils and irises normal  RESP:  respiratory effort and palpation of chest normal, lungs clear to auscultation in upper lobes, diminished in bases, no respiratory distress.  CV:  Palpation and auscultation of heart done , regular rate and rhythm, no murmur, rub, or gallop  ABDOMEN:  normal bowel sounds, soft, nontender, no hepatosplenomegaly or other masses  M/S:   Trace edema noted in BLE. Does not want to move in bed as it is painful.  SKIN:  Dressing place over surgical incision.   NEURO:   Cranial nerves 2-12 are normal tested and grossly at patient's baseline  PSYCH:  oriented to person    Labs:   Labs done in SNF are in [place] EP[facility]. Please refer to them using EPIC/Care Everywhere.    ASSESSMENT/PLAN:  Elevated Temperature. Resolved. Unclear etiology. Last dose of Levofloxacin for pneumonia 9/27/19 with temperature as high as 101.2 F on that day. Peaked at 102.2 F on 9/28/19. Today, 98.1 F. Chest x-ray and urine culture with no acute findings. Could consider DVT with slight fever. May also consider viral infection, but has no respiratory symptoms. Continue to monitor.     Neurogenic Bladder. Paulson catheter placed during hospitalization and not removed at discharge. Discussed with patient using urinal to urinate  once alvarado catheter is removed. Order written to remove alvarado catheter with bladder scan x 72 with parameters for straight catheterization. Also on Tamsulosin. PCP recommended patient see Urology in the past    Leukocytosis. Resolved. Will repeat on 10/3/19 to ensure stability.     Hyponatremia. Mild. May be related hydration status. Repeat BMP on 10/3/19 to ensure stability.    History of Spinal Meningioma s/p L4-L5 Fusion and T8-T9 Laminectomy in 2010 with Progressive Weakness/T9-10 Bilateral Laminectomy for Decompression of Central Stenosis on 9/18/19 by Dr. Munguia. Follow-up with Juliana Cardenas PA-C, on 10/31/19 as scheduled. MS Contin, Oxycodone and Methocarbamol available for pain control. Also on Gabapentin and Amitriptyline for numbness. On no DVT prophylaxis. Will ask staff to check with [facility] again to ensure this is intended. Physical and Occupational Therapy ordered for deconditioning.     Electronically signed by:  AMIE Mondragon CNP

## 2019-09-30 NOTE — LETTER
"    9/30/2019        RE: Elaina Valenzuela  [address]  [place] [address]        [place] [facility]  [place] Medical Record Number:  [id number]  Place of Service where encounter took place:  [facility] ([facility] [[id number]]  Chief Complaint   Patient presents with     Nursing Home Acute     HPI:    Elaina Valenzuela  is a 58 year old (1960), who is being seen today for an episodic care visit.  HPI information obtained from: facility chart records, facility staff, patient report and [facility] chart review. Today's concern is:    Elevated Temperature. On-call NP updated on 9/27/19 regarding a tympanic temperature 100.2 and 101.2 with SpO2 88-93% with patient complaint of feeling cold. Ordered STAT CBC, Incentive Spirometer, DuoNebs and Acetaminophen PRN. Labs revealed WBC 15.6, down from 16.9 on 9/25/19. Incision clean, dry and intact. Urine daniella in color. On 9/28/19, lower temperature, but heart rate 107 with SpO2 88-90%. Ordered chest x-ray and urine culture. Later, on-call MD updated with temperature 102 F, feeling hot and cold. Recommended transfer to ED for further evaluation, but facility requested patient remain at facility. Chest x-ray revealed \"low lung volumes\" with no acute findings. Urine culture with no growth. Last day of Levofloxacin 9/27/19 for pneumonia. Today, patient's temperature 98.1 F. Per patient report, has no concerns. Has been using the incentive spirometer regularly.     Neurogenic Bladder. Alvarado catheter placed during hospitalization. Today, discussed with patient removal of alvarado catheter. Patient reports he is concerned he will not be able to get to the bathroom on time.     Leukocytosis. WBC 8.8 on 9/30/19. Previous WBC 15.6 on 9/27/19.    Hyponatremia. Sodium 133 on 9/30/19. Previous Sodium 136 on 9/24/19.     History of Spinal Meningioma s/p L4-L5 Fusion and T8-T9 Laminectomy in 2010 with Progressive Weakness/T9-10 Bilateral " Laminectomy for Decompression of Central Stenosis on 9/18/19 by Dr. Munguia. Per patient report, feels pain is managed with pain medications. Continue to work with Physical and Occupational Therapy. Requiring EZ stand for transfers. Per staff report, had pain over the weekend, but is doing better today. Upon review of documentation, utilizing Oxycodone 2-5 times per day since TCU admission.     Past Medical and Surgical History reviewed in Epic today.    MEDICATIONS:  Current Outpatient Medications   Medication Sig Dispense Refill     acetaminophen (TYLENOL) 325 MG tablet Take 650 mg by mouth every 4 hours as needed for mild pain       albuterol (PROAIR HFA/PROVENTIL HFA/VENTOLIN HFA) 108 (90 Base) MCG/ACT inhaler Inhale 2 puffs into the lungs every 6 hours as needed for shortness of breath / dyspnea or wheezing 1 Inhaler 10     amitriptyline (ELAVIL) 100 MG tablet TAKE 1 TABLET BY MOUTH EVERY DAY AT BEDTIME 90 tablet 3     atorvastatin (LIPITOR) 20 MG tablet Take 1 tablet (20 mg) by mouth daily 90 tablet 1     BENEFIBER DRINK MIX OR POWD by mouth daily       citalopram (CELEXA) 20 MG tablet Take 1 tablet (20 mg) by mouth daily 90 tablet 1     fluticasone-salmeterol (ADVAIR) 100-50 MCG/DOSE inhaler Inhale 1 puff into the lungs 2 times daily 1 Inhaler 3     gabapentin (NEURONTIN) 300 MG capsule Take 2 capsules (600 mg) by mouth 3 times daily 360 capsule 1     hydrochlorothiazide (HYDRODIURIL) 25 MG tablet TAKE 1 TABLET BY MOUTH EVERY MORNING. NO REFILLS UNTIL FOLLOW UP 90 tablet 1     ipratropium - albuterol 0.5 mg/2.5 mg/3 mL (DUONEB) 0.5-2.5 (3) MG/3ML neb solution Take 1 vial by nebulization 4 times daily       methocarbamol (ROBAXIN) 750 MG tablet Take 1 tablet (750 mg) by mouth every 6 hours as needed for muscle spasms 50 tablet 1     metoprolol tartrate (LOPRESSOR) 100 MG tablet TAKE 1 TABLET(100 MG) BY MOUTH TWICE DAILY 180 tablet 2     morphine (MS CONTIN) 15 MG CR tablet Take 1 tablet (15 mg) by mouth every 12  hours 50 tablet 0     oxyCODONE (ROXICODONE) 5 MG tablet Take 1-2 tablets (5-10 mg) by mouth every 3 hours as needed for moderate to severe pain 50 tablet 0     polyethylene glycol (MIRALAX/GLYCOLAX) packet Take 17 g by mouth daily as needed for constipation       senna-docusate (SENOKOT-S/PERICOLACE) 8.6-50 MG tablet Take 1 tablet by mouth 2 times daily       tamsulosin (FLOMAX) 0.4 MG capsule Take 1 capsule (0.4 mg) by mouth daily 30 capsule 0     REVIEW OF SYSTEMS:  4 point ROS including Respiratory, CV, GI and , other than that noted in the HPI,  is negative    Objective:  /79   Pulse 89   Temp 98.1  F (36.7  C)   Resp 14   SpO2 90%   Exam:  GENERAL APPEARANCE:  Alert, in no distress  ENT:  Mouth and posterior oropharynx normal, moist mucous membranes  EYES:  EOM, conjunctivae, lids, pupils and irises normal  RESP:  respiratory effort and palpation of chest normal, lungs clear to auscultation in upper lobes, diminished in bases, no respiratory distress.  CV:  Palpation and auscultation of heart done , regular rate and rhythm, no murmur, rub, or gallop  ABDOMEN:  normal bowel sounds, soft, nontender, no hepatosplenomegaly or other masses  M/S:   Trace edema noted in BLE. Does not want to move in bed as it is painful.  SKIN:   Dressing place over surgical incision.   NEURO:   Cranial nerves 2-12 are normal tested and grossly at patient's baseline  PSYCH:  oriented to person    Labs:   Labs done in SNF are in [facility]. Please refer to them using [facility]Care Everywhere.    ASSESSMENT/PLAN:  Elevated Temperature. Resolved. Unclear etiology. Last dose of Levofloxacin for pneumonia 9/27/19 with temperature as high as 101.2 F on that day. Peaked at 102.2 F on 9/28/19. Today, 98.1 F. Chest x-ray and urine culture with no acute findings. Could consider DVT with slight fever. May also consider viral infection, but has no respiratory symptoms. Continue to monitor.     Neurogenic Bladder. Paulson catheter placed  during hospitalization and not removed at discharge. Discussed with patient using urinal to urinate once alvarado catheter is removed. Order written to remove alvarado catheter with bladder scan x 72 with parameters for straight catheterization. Also on Tamsulosin. PCP recommended patient see Urology in the past    Leukocytosis. Resolved. Will repeat on 10/3/19 to ensure stability.     Hyponatremia. Mild. May be related hydration status. Repeat BMP on 10/3/19 to ensure stability.    History of Spinal Meningioma s/p L4-L5 Fusion and T8-T9 Laminectomy in 2010 with Progressive Weakness/T9-10 Bilateral Laminectomy for Decompression of Central Stenosis on 9/18/19 by Dr. Munguia. Follow-up with Juliana Cardenas PA-C, on 10/31/19 as scheduled. MS Contin, Oxycodone and Methocarbamol available for pain control. Also on Gabapentin and Amitriptyline for numbness. On no DVT prophylaxis. Will ask staff to check with [facility] again to ensure this is intended. Physical and Occupational Therapy ordered for deconditioning.     Electronically signed by:  AMIE Mondragon CNP           Sincerely,        AMIE Mondragon CNP

## 2019-10-01 RX ORDER — IPRATROPIUM BROMIDE AND ALBUTEROL SULFATE 2.5; .5 MG/3ML; MG/3ML
1 SOLUTION RESPIRATORY (INHALATION) 4 TIMES DAILY
COMMUNITY
End: 2019-10-07

## 2019-10-02 NOTE — PROGRESS NOTES
[facility]  INITIAL VISIT NOTE  October 3, 2019    PRIMARY CARE PROVIDER AND CLINIC:  Clinic, [facility] [place] [address] [facility] / [place] [address]    CHIEF COMPLAINT:  Hospital follow-up/Initial visit    HPI:    Elaina Valenzuela is a 58 year old  (1960) male who was seen at [facility] at [facility] on October 3, 2019 for an initial visit. Medical history is notable for spinal meningioma and arachnoid cyst, status post thoracic spine surgery in 2010, lumbar spine herniated disc, hypertension, dyslipidemia, asthma, paraplegia, neurogenic bladder, UTI, chronic back pain.  He was hospitalized at [facility] September 18 through September 25, 2019 for elective T9-T10 bilateral laminectomy on September 18 for decompression of central stenosis.  The surgery was performed under general anesthesia by Dr. Reed Munguia, and EBL was 100 mL.  On postop day 2, patient developed fever and hypoxia.  An x-ray revealed a left basilar atelectasis or pneumonia.  He was treated empirically with levofloxacin which continued through September 27.    Patient is admitted to this facility for medical management, nursing care, and rehab.     Over the past weekend, in TCU, the patient developed fever to 102.2.  Repeat chest x-ray on September 28, 2019 showed no evidence for acute cardiopulmonary disease.  Urine culture was also yielded no growth.    Patient was seen today in his room, while lying in bed.  There is no report of fever for the past 24 hours.  His surgical pain is controlled.  He denies chills, cough, sputum, chest pain, palpitation, dyspnea, nausea, vomiting, or abdominal pain.  Paulson catheter was placed again today as attempted voiding trial failed.  He states he has not had any bowel movements for the past 5 days.    CODE STATUS:   CPR/Full code     PAST MEDICAL HISTORY:   Hypertension  Dyslipidemia  Asthma, mild persistent  Spinal T8-T9 meningioma, status post thoracic  spine surgery in March 2010  T11 and T2-3 arachnoid cyst  L4-L5 herniated disc with radiculopathy, status post laminectomy in March 2010  Paraplegia  Neurogenic bladder  Depression  Chronic back pain  UTI    PAST SURGICAL HISTORY:   Past Surgical History:   Procedure Laterality Date     C NONSPECIFIC PROCEDURE      Rt. shoulder cyst removed, at [place]     COLONOSCOPY  5/2016    repeat in 1 year due to very poor prep     COLONOSCOPY N/A 5/19/2016    Procedure: COLONOSCOPY;  Surgeon: Jean Guo MD;  Location: [facility] OR     COLONOSCOPY WITH CO2 INSUFFLATION N/A 5/19/2016    Procedure: COLONOSCOPY WITH CO2 INSUFFLATION;  Surgeon: Jaen Guo MD;  Location: [facility] OR     LAMINECT/DISCECTOMY, LUMBAR  3/19/2010    L4-5 herniated disc with severe left leg neruopathy      LAMINECTOMY THORACIC ONE LEVEL N/A 9/18/2019    Procedure: T9-10 THORASIC LAMINECTOMY WITH PRE-PROCEDURE NEURO MONITORING;  Surgeon: Reed Munguia MD;  Location: [facility] OR     SURGICAL HISTORY OF -   3/23/2010    removal of meningial tumor of thoracic spine       FAMILY HISTORY:   Family History   Problem Relation Age of Onset     Hypertension Mother        SOCIAL HISTORY:   Patient is  and lives in a house with his wife and son.  He is wheelchair-bound.  He does not smoke.  He drinks alcohol on rare occasions.    MEDICATIONS:  Current Outpatient Medications   Medication Sig Dispense Refill     acetaminophen (TYLENOL) 325 MG tablet Take 650 mg by mouth every 4 hours as needed for mild pain       albuterol (PROAIR HFA/PROVENTIL HFA/VENTOLIN HFA) 108 (90 Base) MCG/ACT inhaler Inhale 2 puffs into the lungs every 6 hours as needed for shortness of breath / dyspnea or wheezing 1 Inhaler 10     amitriptyline (ELAVIL) 100 MG tablet TAKE 1 TABLET BY MOUTH EVERY DAY AT BEDTIME 90 tablet 3     atorvastatin (LIPITOR) 20 MG tablet Take 1 tablet (20 mg) by mouth daily 90 tablet 1     BENEFIBER DRINK MIX OR POWD by mouth daily       citalopram  (CELEXA) 20 MG tablet Take 1 tablet (20 mg) by mouth daily 90 tablet 1     fluticasone-salmeterol (ADVAIR) 100-50 MCG/DOSE inhaler Inhale 1 puff into the lungs 2 times daily 1 Inhaler 3     gabapentin (NEURONTIN) 300 MG capsule Take 2 capsules (600 mg) by mouth 3 times daily 360 capsule 1     hydrochlorothiazide (HYDRODIURIL) 25 MG tablet TAKE 1 TABLET BY MOUTH EVERY MORNING. NO REFILLS UNTIL FOLLOW UP 90 tablet 1     ipratropium - albuterol 0.5 mg/2.5 mg/3 mL (DUONEB) 0.5-2.5 (3) MG/3ML neb solution Take 1 vial by nebulization 4 times daily       methocarbamol (ROBAXIN) 750 MG tablet Take 1 tablet (750 mg) by mouth every 6 hours as needed for muscle spasms 50 tablet 1     metoprolol tartrate (LOPRESSOR) 100 MG tablet TAKE 1 TABLET(100 MG) BY MOUTH TWICE DAILY 180 tablet 2     morphine (MS CONTIN) 15 MG CR tablet Take 1 tablet (15 mg) by mouth every 12 hours 50 tablet 0     oxyCODONE (ROXICODONE) 5 MG tablet Take 1-2 tablets (5-10 mg) by mouth every 3 hours as needed for moderate to severe pain 50 tablet 0     polyethylene glycol (MIRALAX/GLYCOLAX) packet Take 17 g by mouth daily as needed for constipation       senna-docusate (SENOKOT-S/PERICOLACE) 8.6-50 MG tablet Take 1 tablet by mouth 2 times daily       tamsulosin (FLOMAX) 0.4 MG capsule Take 1 capsule (0.4 mg) by mouth daily 30 capsule 0       ALLERGIES:  Allergies   Allergen Reactions     Penicillins Rash       ROS:  10 point ROS neg other than the symptoms noted above in the HPI.    PHYSICAL EXAM:  Vitals: Blood pressure 132/89, heart rate 78, respiratory rate 16, temperature 98.3  F, oxygen saturation 93%  Gen: Cooperative and in no acute distress  HEENT: Normocephalic; oropharynx clear, mild conjunctival pallor  Card: Normal S1, S2, RRR, no murmurs  Resp: Lungs clear to auscultation bilaterally  GI: Abdomen soft, not-tender, slightly distended, +BS  Ext: Trace bilateral LE edema  Neuro: CX II-XII grossly intact, paraplegia+  Psych: Alert and oriented x3;  normal affect  Skin: Surgical wound is clean and there is no evidence for erythema or purulent discharge    LABORATORY/IMAGING DATA:  Lab Results   Component Value Date    WBC 8.8 09/30/2019     Lab Results   Component Value Date    RBC 4.22 09/30/2019     Lab Results   Component Value Date    HGB 11.6 09/30/2019     Lab Results   Component Value Date    HCT 37.8 09/30/2019     Lab Results   Component Value Date    MCV 90 09/30/2019     Lab Results   Component Value Date    MCH 27.5 09/30/2019     Lab Results   Component Value Date    MCHC 30.7 09/30/2019     Lab Results   Component Value Date    RDW 15.2 09/30/2019     Lab Results   Component Value Date     09/30/2019     Last Comprehensive Metabolic Panel:  Sodium   Date Value Ref Range Status   09/30/2019 133 (A) 136 - 145 mmol/L Final     Potassium   Date Value Ref Range Status   09/30/2019 3.6 3.5 - 5.0 mmol/L Final     Chloride   Date Value Ref Range Status   09/30/2019 94 (A) 98 - 107 mmol/L Final     Carbon Dioxide   Date Value Ref Range Status   09/30/2019 28 22 - 31 mmol/L Final     Anion Gap   Date Value Ref Range Status   09/30/2019 11 5 - 18 mmol/L Final     Glucose   Date Value Ref Range Status   09/30/2019 94 70 - 125 mg/dL Final     Urea Nitrogen   Date Value Ref Range Status   09/30/2019 11 8 - 22 mg/dL Final     Creatinine   Date Value Ref Range Status   09/30/2019 0.88 0.70 - 1.30 mg/dL Final     GFR Estimate   Date Value Ref Range Status   09/30/2019 >60 >60 ml/min/1.73m2 Final     Calcium   Date Value Ref Range Status   09/30/2019 8.5 8.5 - 10.5 mg/dL Final         ASSESSMENT/PLAN:  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  Patient is doing well and is hemodynamically stable.  Plan:  Pain management  with PRN acetaminophen and oxycodone  As needed methocarbamol for muscle spasms  PT/OT evaluation therapy  Follow-up with Tolu Light PA-C on October 31, 2019    Postop pneumonia,  Intermittent fever,  Leukocytosis.  Patient reportedly had hypoxia and fever postop, and the chest x-ray on September 20 showed left lower lobe atelectasis versus pneumonia.  He was treated with oral levofloxacin, which was completed on September 27.  Postop he also developed leukocytosis, with white count to 15.7 on September 20.  In the TCU, the patient developed fever to 102.2  F.  Surgical wound appears clean with no signs of infection. Repeat chest x-ray September 28, showed no active cardiopulmonary disease.  Urine culture also did not grow any organism.  Suspect fever and leukocytosis are due to inflammation at the surgical site.  Both are improving.  WBC is now down to 8.8 (09/30).  Plan:  Continue to monitor fever curve  Obtain thoracic spine CT if patient continues to have fever  Consider lower extremity Doppler to rule out DVT if fever persists  Repeat CBC today, October 3    Acute blood loss anemia.  Hemoglobin was 14 and normal on September 13, 2019 prior to spinal surgery.  Hemoglobin dropped to 10.5 on September 25, 2019, postop.  Plan:  Repeat hemoglobin today, October 3    Hypertension, benign essential.  Blood pressure is currently controlled.  Plan:  Continue prior to admission hydrochlorothiazide 25 mg p.o. daily, metoprolol 100 mg p.o. twice daily  Continue to monitor blood pressure per TCU protocol    Dyslipidemia.  Chronic pain  Plan:  Continue prior to admission atorvastatin 20 mg daily    Neurogenic bladder,  Acute postop urinary retention.  Patient states that he is normally able to urinate at home and does not require self-catheterization.  Paulson catheter was placed during recent hospitalization and was not removed at discharge.  Attempted voiding trial on October 1 failed and Paulson catheter was again placed on  October 3.  Plan:  Continue prior to admission tamsulosin 0.4 mg p.o. daily  Attempt voiding trial again in 1 week  Follow-up with urology as outpatient    Mild persistent asthma.  Stable.  Treated with doxycycline and Medrol Dosepak for asthma exacerbation, prior to recent hospitalization.    Plan:  Continue prior to admission Advair and as needed albuterol inhaler  Monitor respiratory status    Depression.  Chronic pain  Plan:  Continue prior to admission citalopram 20 mg p.o. daily and amitriptyline 100 mg p.o. at bedtime    Slow transit constipation.  Plan:  Continue scheduled Senna-S, Benefiber, and PRN MiraLAX      Electronically signed by:  Ellis San MD

## 2019-10-03 ENCOUNTER — TRANSFERRED RECORDS (OUTPATIENT)
Dept: HEALTH INFORMATION MANAGEMENT | Facility: CLINIC | Age: 59
End: 2019-10-03

## 2019-10-03 ENCOUNTER — RECORDS - HEALTHEAST (OUTPATIENT)
Dept: LAB | Facility: CLINIC | Age: 59
End: 2019-10-03

## 2019-10-03 ENCOUNTER — NURSING HOME VISIT (OUTPATIENT)
Dept: GERIATRICS | Facility: CLINIC | Age: 59
End: 2019-10-03
Payer: COMMERCIAL

## 2019-10-03 VITALS
DIASTOLIC BLOOD PRESSURE: 89 MMHG | SYSTOLIC BLOOD PRESSURE: 132 MMHG | WEIGHT: 179.8 LBS | OXYGEN SATURATION: 92 % | TEMPERATURE: 97.7 F | BODY MASS INDEX: 29.02 KG/M2 | HEART RATE: 92 BPM | RESPIRATION RATE: 20 BRPM

## 2019-10-03 DIAGNOSIS — E78.5 DYSLIPIDEMIA: ICD-10-CM

## 2019-10-03 DIAGNOSIS — F32.A DEPRESSION, UNSPECIFIED DEPRESSION TYPE: ICD-10-CM

## 2019-10-03 DIAGNOSIS — G95.20 COMPRESSION OF SPINAL CORD WITH MYELOPATHY (H): ICD-10-CM

## 2019-10-03 DIAGNOSIS — J18.9 PNEUMONIA DUE TO INFECTIOUS ORGANISM, UNSPECIFIED LATERALITY, UNSPECIFIED PART OF LUNG: ICD-10-CM

## 2019-10-03 DIAGNOSIS — I10 BENIGN ESSENTIAL HYPERTENSION: ICD-10-CM

## 2019-10-03 DIAGNOSIS — N31.9 NEUROGENIC BLADDER: ICD-10-CM

## 2019-10-03 DIAGNOSIS — K59.01 SLOW TRANSIT CONSTIPATION: ICD-10-CM

## 2019-10-03 DIAGNOSIS — D72.829 LEUKOCYTOSIS, UNSPECIFIED TYPE: ICD-10-CM

## 2019-10-03 DIAGNOSIS — R33.8 ACUTE URINARY RETENTION: ICD-10-CM

## 2019-10-03 DIAGNOSIS — M48.04 THORACIC SPINAL STENOSIS: Primary | ICD-10-CM

## 2019-10-03 DIAGNOSIS — R53.81 PHYSICAL DECONDITIONING: ICD-10-CM

## 2019-10-03 DIAGNOSIS — D62 ANEMIA DUE TO BLOOD LOSS, ACUTE: ICD-10-CM

## 2019-10-03 DIAGNOSIS — J45.30 MILD PERSISTENT ASTHMA WITHOUT COMPLICATION: ICD-10-CM

## 2019-10-03 DIAGNOSIS — Z98.890 S/P LAMINECTOMY: ICD-10-CM

## 2019-10-03 LAB
ANION GAP SERPL CALCULATED.3IONS-SCNC: 12 MMOL/L (ref 5–18)
ANION GAP SERPL CALCULATED.3IONS-SCNC: 12 MMOL/L (ref 5–18)
BUN SERPL-MCNC: 10 MG/DL (ref 8–22)
BUN SERPL-MCNC: 10 MG/DL (ref 8–22)
CALCIUM SERPL-MCNC: 8.7 MG/DL (ref 8.5–10.5)
CALCIUM SERPL-MCNC: 8.7 MG/DL (ref 8.5–10.5)
CHLORIDE BLD-SCNC: 97 MMOL/L (ref 98–107)
CHLORIDE SERPLBLD-SCNC: 97 MMOL/L (ref 98–107)
CO2 SERPL-SCNC: 27 MMOL/L (ref 22–31)
CO2 SERPL-SCNC: 27 MMOL/L (ref 22–31)
CREAT SERPL-MCNC: 0.81 MG/DL (ref 0.7–1.3)
CREAT SERPL-MCNC: 0.81 MG/DL (ref 0.7–1.3)
ERYTHROCYTE [DISTWIDTH] IN BLOOD BY AUTOMATED COUNT: 15 % (ref 11–14.5)
ERYTHROCYTE [DISTWIDTH] IN BLOOD BY AUTOMATED COUNT: 15 % (ref 11–14.5)
GFR SERPL CREATININE-BSD FRML MDRD: >60 ML/MIN/1.73M2
GFR SERPL CREATININE-BSD FRML MDRD: >60 ML/MIN/1.73M2
GLUCOSE BLD-MCNC: 108 MG/DL (ref 70–125)
GLUCOSE SERPL-MCNC: 108 MG/DL (ref 70–125)
HCT VFR BLD AUTO: 37.9 % (ref 40–54)
HCT VFR BLD AUTO: 37.9 % (ref 40–54)
HEMOGLOBIN: 12 G/DL (ref 14–18)
HGB BLD-MCNC: 12 G/DL (ref 14–18)
MCH RBC QN AUTO: 27.5 PG (ref 27–34)
MCH RBC QN AUTO: 27.5 PG (ref 27–34)
MCHC RBC AUTO-ENTMCNC: 31.7 G/DL (ref 32–36)
MCHC RBC AUTO-ENTMCNC: 31.7 G/DL (ref 32–36)
MCV RBC AUTO: 87 FL (ref 80–100)
MCV RBC AUTO: 87 FL (ref 80–100)
PLATELET # BLD AUTO: 384 THOU/UL (ref 140–440)
PLATELET # BLD AUTO: 384 THOU/UL (ref 140–440)
PMV BLD AUTO: 9.2 FL (ref 8.5–12.5)
POTASSIUM BLD-SCNC: 3.3 MMOL/L (ref 3.5–5)
POTASSIUM SERPL-SCNC: 3.3 MMOL/L (ref 3.5–5)
RBC # BLD AUTO: 4.37 MILL/UL (ref 4.4–6.2)
RBC # BLD AUTO: 4.37 MILL/UL (ref 4.4–6.2)
SODIUM SERPL-SCNC: 136 MMOL/L (ref 136–145)
SODIUM SERPL-SCNC: 136 MMOL/L (ref 136–145)
WBC # BLD AUTO: 7.7 THOU/UL (ref 4–11)
WBC: 7.7 THOU/UL (ref 4–11)

## 2019-10-03 PROCEDURE — 99207 ZZC CDG-MDM COMPONENT: MEETS LOW - DOWN CODED: CPT | Performed by: INTERNAL MEDICINE

## 2019-10-03 PROCEDURE — 99305 1ST NF CARE MODERATE MDM 35: CPT | Performed by: INTERNAL MEDICINE

## 2019-10-03 NOTE — LETTER
10/3/2019        RE: Elaina Valenzuela  [address]  [place] [address]        [facility]  INITIAL VISIT NOTE  October 3, 2019    PRIMARY CARE PROVIDER AND CLINIC:  Clinic, [place] [address] [facility] [address] / [place] [address]    CHIEF COMPLAINT:  Hospital follow-up/Initial visit    HPI:    Elaina Valenzuela is a 58 year old  (1960) male who was seen at [facility] on October 3, 2019 for an initial visit. Medical history is notable for spinal meningioma and arachnoid cyst, status post thoracic spine surgery in 2010, lumbar spine herniated disc, hypertension, dyslipidemia, asthma, paraplegia, neurogenic bladder, UTI, chronic back pain.  He was hospitalized at [facility] September 18 through September 25, 2019 for elective T9-T10 bilateral laminectomy on September 18 for decompression of central stenosis.  The surgery was performed under general anesthesia by Dr. Reed Munguia, and EBL was 100 mL.  On postop day 2, patient developed fever and hypoxia.  An x-ray revealed a left basilar atelectasis or pneumonia.  He was treated empirically with levofloxacin which continued through September 27.    Patient is admitted to this facility for medical management, nursing care, and rehab.     Over the past weekend, in TCU, the patient developed fever to 102.2.  Repeat chest x-ray on September 28, 2019 showed no evidence for acute cardiopulmonary disease.  Urine culture was also yielded no growth.    Patient was seen today in his room, while lying in bed.  There is no report of fever for the past 24 hours.  His surgical pain is controlled.  He denies chills, cough, sputum, chest pain, palpitation, dyspnea, nausea, vomiting, or abdominal pain.  Paulson catheter was placed again today as attempted voiding trial failed.  He states he has not had any bowel movements for the past 5 days.    CODE STATUS:   CPR/Full code     PAST MEDICAL HISTORY:    Hypertension  Dyslipidemia  Asthma, mild persistent  Spinal T8-T9 meningioma, status post thoracic spine surgery in March 2010  T11 and T2-3 arachnoid cyst  L4-L5 herniated disc with radiculopathy, status post laminectomy in March 2010  Paraplegia  Neurogenic bladder  Depression  Chronic back pain  UTI    PAST SURGICAL HISTORY:   Past Surgical History:   Procedure Laterality Date     C NONSPECIFIC PROCEDURE      Rt. shoulder cyst removed, at [place]     COLONOSCOPY  5/2016    repeat in 1 year due to very poor prep     COLONOSCOPY N/A 5/19/2016    Procedure: COLONOSCOPY;  Surgeon: Jean Guo MD;  Location: [facility] OR     COLONOSCOPY WITH CO2 INSUFFLATION N/A 5/19/2016    Procedure: COLONOSCOPY WITH CO2 INSUFFLATION;  Surgeon: Jean Guo MD;  Location: [facility] OR     LAMINECT/DISCECTOMY, LUMBAR  3/19/2010    L4-5 herniated disc with severe left leg neruopathy      LAMINECTOMY THORACIC ONE LEVEL N/A 9/18/2019    Procedure: T9-10 THORASIC LAMINECTOMY WITH PRE-PROCEDURE NEURO MONITORING;  Surgeon: Reed Munguia MD;  Location: [facility] OR     SURGICAL HISTORY OF -   3/23/2010    removal of meningial tumor of thoracic spine       FAMILY HISTORY:   Family History   Problem Relation Age of Onset     Hypertension Mother        SOCIAL HISTORY:   Patient is  and lives in a house with his wife and son.  He is wheelchair-bound.  He does not smoke.  He drinks alcohol on rare occasions.    MEDICATIONS:  Current Outpatient Medications   Medication Sig Dispense Refill     acetaminophen (TYLENOL) 325 MG tablet Take 650 mg by mouth every 4 hours as needed for mild pain       albuterol (PROAIR HFA/PROVENTIL HFA/VENTOLIN HFA) 108 (90 Base) MCG/ACT inhaler Inhale 2 puffs into the lungs every 6 hours as needed for shortness of breath / dyspnea or wheezing 1 Inhaler 10     amitriptyline (ELAVIL) 100 MG tablet TAKE 1 TABLET BY MOUTH EVERY DAY AT BEDTIME 90 tablet 3     atorvastatin (LIPITOR) 20 MG tablet Take 1 tablet  (20 mg) by mouth daily 90 tablet 1     BENEFIBER DRINK MIX OR POWD by mouth daily       citalopram (CELEXA) 20 MG tablet Take 1 tablet (20 mg) by mouth daily 90 tablet 1     fluticasone-salmeterol (ADVAIR) 100-50 MCG/DOSE inhaler Inhale 1 puff into the lungs 2 times daily 1 Inhaler 3     gabapentin (NEURONTIN) 300 MG capsule Take 2 capsules (600 mg) by mouth 3 times daily 360 capsule 1     hydrochlorothiazide (HYDRODIURIL) 25 MG tablet TAKE 1 TABLET BY MOUTH EVERY MORNING. NO REFILLS UNTIL FOLLOW UP 90 tablet 1     ipratropium - albuterol 0.5 mg/2.5 mg/3 mL (DUONEB) 0.5-2.5 (3) MG/3ML neb solution Take 1 vial by nebulization 4 times daily       methocarbamol (ROBAXIN) 750 MG tablet Take 1 tablet (750 mg) by mouth every 6 hours as needed for muscle spasms 50 tablet 1     metoprolol tartrate (LOPRESSOR) 100 MG tablet TAKE 1 TABLET(100 MG) BY MOUTH TWICE DAILY 180 tablet 2     morphine (MS CONTIN) 15 MG CR tablet Take 1 tablet (15 mg) by mouth every 12 hours 50 tablet 0     oxyCODONE (ROXICODONE) 5 MG tablet Take 1-2 tablets (5-10 mg) by mouth every 3 hours as needed for moderate to severe pain 50 tablet 0     polyethylene glycol (MIRALAX/GLYCOLAX) packet Take 17 g by mouth daily as needed for constipation       senna-docusate (SENOKOT-S/PERICOLACE) 8.6-50 MG tablet Take 1 tablet by mouth 2 times daily       tamsulosin (FLOMAX) 0.4 MG capsule Take 1 capsule (0.4 mg) by mouth daily 30 capsule 0       ALLERGIES:  Allergies   Allergen Reactions     Penicillins Rash       ROS:  10 point ROS neg other than the symptoms noted above in the HPI.    PHYSICAL EXAM:  Vitals: Blood pressure 132/89, heart rate 78, respiratory rate 16, temperature 98.3  F, oxygen saturation 93%  Gen: Cooperative and in no acute distress  HEENT: Normocephalic; oropharynx clear, mild conjunctival pallor  Card: Normal S1, S2, RRR, no murmurs  Resp: Lungs clear to auscultation bilaterally  GI: Abdomen soft, not-tender, slightly distended, +BS  Ext:  Trace bilateral LE edema  Neuro: CX II-XII grossly intact, paraplegia+  Psych: Alert and oriented x3; normal affect  Skin: Surgical wound is clean and there is no evidence for erythema or purulent discharge    LABORATORY/IMAGING DATA:  Lab Results   Component Value Date    WBC 8.8 09/30/2019     Lab Results   Component Value Date    RBC 4.22 09/30/2019     Lab Results   Component Value Date    HGB 11.6 09/30/2019     Lab Results   Component Value Date    HCT 37.8 09/30/2019     Lab Results   Component Value Date    MCV 90 09/30/2019     Lab Results   Component Value Date    MCH 27.5 09/30/2019     Lab Results   Component Value Date    MCHC 30.7 09/30/2019     Lab Results   Component Value Date    RDW 15.2 09/30/2019     Lab Results   Component Value Date     09/30/2019     Last Comprehensive Metabolic Panel:  Sodium   Date Value Ref Range Status   09/30/2019 133 (A) 136 - 145 mmol/L Final     Potassium   Date Value Ref Range Status   09/30/2019 3.6 3.5 - 5.0 mmol/L Final     Chloride   Date Value Ref Range Status   09/30/2019 94 (A) 98 - 107 mmol/L Final     Carbon Dioxide   Date Value Ref Range Status   09/30/2019 28 22 - 31 mmol/L Final     Anion Gap   Date Value Ref Range Status   09/30/2019 11 5 - 18 mmol/L Final     Glucose   Date Value Ref Range Status   09/30/2019 94 70 - 125 mg/dL Final     Urea Nitrogen   Date Value Ref Range Status   09/30/2019 11 8 - 22 mg/dL Final     Creatinine   Date Value Ref Range Status   09/30/2019 0.88 0.70 - 1.30 mg/dL Final     GFR Estimate   Date Value Ref Range Status   09/30/2019 >60 >60 ml/min/1.73m2 Final     Calcium   Date Value Ref Range Status   09/30/2019 8.5 8.5 - 10.5 mg/dL Final         ASSESSMENT/PLAN:  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  Patient is doing well and is hemodynamically stable.  Plan:  Pain management with PRN acetaminophen and oxycodone  As needed methocarbamol for muscle spasms  PT/OT evaluation therapy  Follow-up with Tolu Light PA-C on October 31, 2019    Postop pneumonia,  Intermittent fever,  Leukocytosis.  Patient reportedly had hypoxia and fever postop, and the chest x-ray on September 20 showed left lower lobe atelectasis versus pneumonia.  He was treated with oral levofloxacin, which was completed on September 27.  Postop he also developed leukocytosis, with white count to 15.7 on September 20.  In the TCU, the patient developed fever to 102.2  F.   Surgical wound appears clean with no signs of infection. Repeat chest x-ray September 28, showed no active cardiopulmonary disease.  Urine culture also did not grow any organism.  Suspect fever and leukocytosis are due to inflammation at the surgical site.  Both are improving.  WBC is now down to 8.8 (09/30).  Plan:  Continue to monitor fever curve  Obtain thoracic spine CT if patient continues to have fever  Consider lower extremity Doppler to rule out DVT if fever persists  Repeat CBC today, October 3    Acute blood loss anemia.  Hemoglobin was 14 and normal on September 13, 2019 prior to spinal surgery.  Hemoglobin dropped to 10.5 on September 25, 2019, postop.  Plan:  Repeat hemoglobin today, October 3    Hypertension, benign essential.  Blood pressure is currently controlled.  Plan:  Continue prior to admission hydrochlorothiazide 25 mg p.o. daily, metoprolol 100 mg p.o. twice daily  Continue to monitor blood pressure per TCU protocol    Dyslipidemia.  Chronic pain  Plan:  Continue prior to admission atorvastatin 20 mg daily    Neurogenic bladder,  Acute postop urinary retention.  Patient states that he is normally able to urinate at home and does not require self-catheterization.  Paulson catheter was placed during recent hospitalization and was  not removed at discharge.  Attempted voiding trial on October 1 failed and Paulson catheter was again placed on October 3.  Plan:  Continue prior to admission tamsulosin 0.4 mg p.o. daily  Attempt voiding trial again in 1 week  Follow-up with urology as outpatient    Mild persistent asthma.  Stable.  Treated with doxycycline and Medrol Dosepak for asthma exacerbation, prior to recent hospitalization.    Plan:  Continue prior to admission Advair and as needed albuterol inhaler  Monitor respiratory status    Depression.  Chronic pain  Plan:  Continue prior to admission citalopram 20 mg p.o. daily and amitriptyline 100 mg p.o. at bedtime    Slow transit constipation.  Plan:  Continue scheduled Senna-S, Benefiber, and PRN MiraLAX      Electronically signed by:  Ellis San MD                        Sincerely,        Ellis San MD

## 2019-10-07 ENCOUNTER — NURSING HOME VISIT (OUTPATIENT)
Dept: GERIATRICS | Facility: CLINIC | Age: 59
End: 2019-10-07
Payer: COMMERCIAL

## 2019-10-07 VITALS
OXYGEN SATURATION: 91 % | DIASTOLIC BLOOD PRESSURE: 88 MMHG | TEMPERATURE: 96.9 F | SYSTOLIC BLOOD PRESSURE: 132 MMHG | HEART RATE: 85 BPM | WEIGHT: 176.6 LBS | RESPIRATION RATE: 18 BRPM | BODY MASS INDEX: 28.5 KG/M2

## 2019-10-07 DIAGNOSIS — E87.6 HYPOKALEMIA: ICD-10-CM

## 2019-10-07 DIAGNOSIS — I10 BENIGN ESSENTIAL HYPERTENSION: ICD-10-CM

## 2019-10-07 DIAGNOSIS — N31.9 NEUROGENIC BLADDER: ICD-10-CM

## 2019-10-07 DIAGNOSIS — D32.1 SPINAL MENINGIOMA (H): Primary | ICD-10-CM

## 2019-10-07 PROCEDURE — 99309 SBSQ NF CARE MODERATE MDM 30: CPT | Performed by: NURSE PRACTITIONER

## 2019-10-07 NOTE — PROGRESS NOTES
[facility]  [facility] Record Number:  [id number]  Place of Service where encounter took place:  [facility] ([facility]) [[id number]]  Chief Complaint   Patient presents with     Nursing Home Acute     HPI:    Elaina Valenzuela  is a 58 year old (1960), who is being seen today for an episodic care visit.  HPI information obtained from: facility chart records, facility staff, patient report and [facility] chart review. Today's concern is:    History of Spinal Meningioma s/p L4-L5 Fusion and T8-T9 Laminectomy in 2010 with Progressive Weakness/T9-10 Bilateral Laminectomy for Decompression of Central Stenosis on 9/18/19 by Dr. Munguia. Today, patient denies pain at rest. Is sitting up in wheelchair. Reports pain when working with therapy. Feels therapy is going well. Per staff report, patient's goal is to walk prior to discharging TCU. Is not safe for sliding board transfers, as was doing PTA, due to poor trunk control. Using EZ stand for transfers. Upon review of documentation, did not utilize Oxycodone PRN yesterday. Was using 1-3 times daily in days prior.     Hypokalemia. Potassium 3.3 on 10/3/19. Previous Potassium 3.6 on 9/30/19.     Neurogenic Bladder. Alvarado catheter removed from hospital discharge on 9/30/19. PVRs 508 and 648 on 10/1/19 and 10/2/19. On 10/3/19, PVR was 565 ml so alvarado catheter was re-inserted. Today, patient notes no trouble with alvarado catheter.      Hypertension. Upon review of blood pressure over the past 5 days, systolic range from 106-134. Diastolic range from 75-89.    Past Medical and Surgical History reviewed in Epic today.    MEDICATIONS:  Current Outpatient Medications   Medication Sig Dispense Refill     acetaminophen (TYLENOL) 325 MG tablet Take 650 mg by mouth every 4 hours as needed for mild pain       albuterol (PROAIR HFA/PROVENTIL HFA/VENTOLIN HFA) 108 (90 Base) MCG/ACT inhaler Inhale 2 puffs into the lungs every 6 hours as needed for  shortness of breath / dyspnea or wheezing 1 Inhaler 10     amitriptyline (ELAVIL) 100 MG tablet TAKE 1 TABLET BY MOUTH EVERY DAY AT BEDTIME 90 tablet 3     atorvastatin (LIPITOR) 20 MG tablet Take 1 tablet (20 mg) by mouth daily 90 tablet 1     BENEFIBER DRINK MIX OR POWD Take 2 Tablespoonful by mouth daily        citalopram (CELEXA) 20 MG tablet Take 1 tablet (20 mg) by mouth daily 90 tablet 1     fluticasone-salmeterol (ADVAIR) 100-50 MCG/DOSE inhaler Inhale 1 puff into the lungs 2 times daily 1 Inhaler 3     gabapentin (NEURONTIN) 300 MG capsule Take 2 capsules (600 mg) by mouth 3 times daily 360 capsule 1     hydrochlorothiazide (HYDRODIURIL) 25 MG tablet TAKE 1 TABLET BY MOUTH EVERY MORNING. NO REFILLS UNTIL FOLLOW UP 90 tablet 1     ipratropium - albuterol 0.5 mg/2.5 mg/3 mL (DUONEB) 0.5-2.5 (3) MG/3ML neb solution Take 1 vial by nebulization 4 times daily       methocarbamol (ROBAXIN) 750 MG tablet Take 1 tablet (750 mg) by mouth every 6 hours as needed for muscle spasms 50 tablet 1     metoprolol tartrate (LOPRESSOR) 100 MG tablet TAKE 1 TABLET(100 MG) BY MOUTH TWICE DAILY 180 tablet 2     morphine (MS CONTIN) 15 MG CR tablet Take 1 tablet (15 mg) by mouth every 12 hours 50 tablet 0     oxyCODONE (ROXICODONE) 5 MG tablet Take 1-2 tablets (5-10 mg) by mouth every 3 hours as needed for moderate to severe pain 50 tablet 0     polyethylene glycol (MIRALAX/GLYCOLAX) packet Take 17 g by mouth daily as needed for constipation       senna-docusate (SENOKOT-S/PERICOLACE) 8.6-50 MG tablet Take 1 tablet by mouth 2 times daily       tamsulosin (FLOMAX) 0.4 MG capsule Take 1 capsule (0.4 mg) by mouth daily 30 capsule 0     REVIEW OF SYSTEMS:  4 point ROS including Respiratory, CV, GI and , other than that noted in the HPI,  is negative    Objective:  /88   Pulse 85   Temp 96.9  F (36.1  C)   Resp 18   Wt 80.1 kg (176 lb 9.6 oz)   SpO2 91%   BMI 28.50 kg/m    Exam:  GENERAL APPEARANCE:  Alert, in no  distress  ENT:  Mouth and posterior oropharynx normal, moist mucous membranes  EYES:  EOM, conjunctivae, lids, pupils and irises normal  RESP:  respiratory effort and palpation of chest normal, lungs clear to auscultation, no respiratory distress  CV:  Palpation and auscultation of heart done , regular rate and rhythm, no murmur, rub, or gallop  ABDOMEN:  normal bowel sounds, soft, nontender, no hepatosplenomegaly or other masses  M/S:   Trace edema noted in BLE.   SKIN:  Surgical incision clean, dry and intact.   NEURO:   Cranial nerves 2-12 are normal tested and grossly at patient's baseline  PSYCH:  oriented to person     Labs:   Labs done in SNF are in [place] EPIC. Please refer to them using [employer]/Care Everywhere.    ASSESSMENT/PLAN:  History of Spinal Meningioma s/p L4-L5 Fusion and T8-T9 Laminectomy in 2010 with Progressive Weakness/T9-10 Bilateral Laminectomy for Decompression of Central Stenosis on 9/18/19 by Dr. Munguia. Follow-up with Juliana Cardenas PA-C, on 10/31/19 as scheduled. MS Contin, Oxycodone and Methocarbamol available for pain control. Also on Gabapentin and Amitriptyline for numbness. On no DVT prophylaxis and staff at facility [facility] regarding this. Physical and Occupational Therapy ordered for deconditioning.      Hypokalemia. Mild. Supplemented with Potassium Chloride 20 mEq PO x 1. Repeat BMP on 10/8/19 to ensure stability.     Neurogenic Bladder. Failed voiding trial on 9/30/19. Repeat voiding trial on 10/9/19. If unsuccessful, will need to re-insert alvarado catheter and follow-up with Urology.     Hypertension. Most blood pressures < 140/90. Monitor blood pressure daily. Continue Metoprolol and Hydrochlorothiazide as ordered.    Electronically signed by:  AMIE Mondragon CNP

## 2019-10-07 NOTE — LETTER
10/7/2019        RE: Elaina Valenzuela  [address]  [place] [address]        [facility]  [facility] Medical Record Number:  [id number]  Place of Service where encounter took place:  [facility] ([facility]) [[id number]]  Chief Complaint   Patient presents with     Nursing Home Acute     HPI:    Elaina Valenzuela  is a 58 year old (1960), who is being seen today for an episodic care visit.  HPI information obtained from: facility chart records, facility staff, patient report and [facility] chart review. Today's concern is:    History of Spinal Meningioma s/p L4-L5 Fusion and T8-T9 Laminectomy in 2010 with Progressive Weakness/T9-10 Bilateral Laminectomy for Decompression of Central Stenosis on 9/18/19 by Dr. Munguia. Today, patient denies pain at rest. Is sitting up in wheelchair. Reports pain when working with therapy. Feels therapy is going well. Per staff report, patient's goal is to walk prior to discharging TCU. Is not safe for sliding board transfers, as was doing PTA, due to poor trunk control. Using EZ stand for transfers. Upon review of documentation, did not utilize Oxycodone PRN yesterday. Was using 1-3 times daily in days prior.     Hypokalemia. Potassium 3.3 on 10/3/19. Previous Potassium 3.6 on 9/30/19.     Neurogenic Bladder. Alvarado catheter removed from hospital discharge on 9/30/19. PVRs 508 and 648 on 10/1/19 and 10/2/19. On 10/3/19, PVR was 565 ml so alvarado catheter was re-inserted. Today, patient notes no trouble with alvarado catheter.      Hypertension. Upon review of blood pressure over the past 5 days, systolic range from 106-134. Diastolic range from 75-89.    Past Medical and Surgical History reviewed in Epic today.    MEDICATIONS:  Current Outpatient Medications   Medication Sig Dispense Refill     acetaminophen (TYLENOL) 325 MG tablet Take 650 mg by mouth every 4 hours as needed for mild pain       albuterol (PROAIR HFA/PROVENTIL HFA/VENTOLIN  HFA) 108 (90 Base) MCG/ACT inhaler Inhale 2 puffs into the lungs every 6 hours as needed for shortness of breath / dyspnea or wheezing 1 Inhaler 10     amitriptyline (ELAVIL) 100 MG tablet TAKE 1 TABLET BY MOUTH EVERY DAY AT BEDTIME 90 tablet 3     atorvastatin (LIPITOR) 20 MG tablet Take 1 tablet (20 mg) by mouth daily 90 tablet 1     BENEFIBER DRINK MIX OR POWD Take 2 Tablespoonful by mouth daily        citalopram (CELEXA) 20 MG tablet Take 1 tablet (20 mg) by mouth daily 90 tablet 1     fluticasone-salmeterol (ADVAIR) 100-50 MCG/DOSE inhaler Inhale 1 puff into the lungs 2 times daily 1 Inhaler 3     gabapentin (NEURONTIN) 300 MG capsule Take 2 capsules (600 mg) by mouth 3 times daily 360 capsule 1     hydrochlorothiazide (HYDRODIURIL) 25 MG tablet TAKE 1 TABLET BY MOUTH EVERY MORNING. NO REFILLS UNTIL FOLLOW UP 90 tablet 1     ipratropium - albuterol 0.5 mg/2.5 mg/3 mL (DUONEB) 0.5-2.5 (3) MG/3ML neb solution Take 1 vial by nebulization 4 times daily       methocarbamol (ROBAXIN) 750 MG tablet Take 1 tablet (750 mg) by mouth every 6 hours as needed for muscle spasms 50 tablet 1     metoprolol tartrate (LOPRESSOR) 100 MG tablet TAKE 1 TABLET(100 MG) BY MOUTH TWICE DAILY 180 tablet 2     morphine (MS CONTIN) 15 MG CR tablet Take 1 tablet (15 mg) by mouth every 12 hours 50 tablet 0     oxyCODONE (ROXICODONE) 5 MG tablet Take 1-2 tablets (5-10 mg) by mouth every 3 hours as needed for moderate to severe pain 50 tablet 0     polyethylene glycol (MIRALAX/GLYCOLAX) packet Take 17 g by mouth daily as needed for constipation       senna-docusate (SENOKOT-S/PERICOLACE) 8.6-50 MG tablet Take 1 tablet by mouth 2 times daily       tamsulosin (FLOMAX) 0.4 MG capsule Take 1 capsule (0.4 mg) by mouth daily 30 capsule 0     REVIEW OF SYSTEMS:  4 point ROS including Respiratory, CV, GI and , other than that noted in the HPI,  is negative    Objective:  /88   Pulse 85   Temp 96.9  F (36.1  C)   Resp 18   Wt 80.1 kg (176  lb 9.6 oz)   SpO2 91%   BMI 28.50 kg/m     Exam:  GENERAL APPEARANCE:  Alert, in no distress  ENT:  Mouth and posterior oropharynx normal, moist mucous membranes  EYES:  EOM, conjunctivae, lids, pupils and irises normal  RESP:  respiratory effort and palpation of chest normal, lungs clear to auscultation, no respiratory distress  CV:  Palpation and auscultation of heart done , regular rate and rhythm, no murmur, rub, or gallop  ABDOMEN:  normal bowel sounds, soft, nontender, no hepatosplenomegaly or other masses  M/S:   Trace edema noted in BLE.   SKIN:  Surgical incision clean, dry and intact.   NEURO:   Cranial nerves 2-12 are normal tested and grossly at patient's baseline  PSYCH:  oriented to person     Labs:   Labs done in SNF are in [place] EPIC. Please refer to them using [employer]/Care Everywhere.    ASSESSMENT/PLAN:  History of Spinal Meningioma s/p L4-L5 Fusion and T8-T9 Laminectomy in 2010 with Progressive Weakness/T9-10 Bilateral Laminectomy for Decompression of Central Stenosis on 9/18/19 by Dr. Munguia. Follow-up with Juliana Cardenas PA-C, on 10/31/19 as scheduled. MS Contin, Oxycodone and Methocarbamol available for pain control. Also on Gabapentin and Amitriptyline for numbness. On no DVT prophylaxis and staff at facility [facility] regarding this. Physical and Occupational Therapy ordered for deconditioning.      Hypokalemia. Mild. Supplemented with Potassium Chloride 20 mEq PO x 1. Repeat BMP on 10/8/19 to ensure stability.     Neurogenic Bladder. Failed voiding trial on 9/30/19. Repeat voiding trial on 10/9/19. If unsuccessful, will need to re-insert alvarado catheter and follow-up with Urology.     Hypertension. Most blood pressures < 140/90. Monitor blood pressure daily. Continue Metoprolol and Hydrochlorothiazide as ordered.    Electronically signed by:  AMEI Mondragon CNP           Sincerely,        AMIE Mondragon CNP

## 2019-10-09 ENCOUNTER — NURSING HOME VISIT (OUTPATIENT)
Dept: GERIATRICS | Facility: CLINIC | Age: 59
End: 2019-10-09
Payer: COMMERCIAL

## 2019-10-09 ENCOUNTER — RECORDS - HEALTHEAST (OUTPATIENT)
Dept: LAB | Facility: CLINIC | Age: 59
End: 2019-10-09

## 2019-10-09 VITALS
OXYGEN SATURATION: 93 % | SYSTOLIC BLOOD PRESSURE: 101 MMHG | BODY MASS INDEX: 28.76 KG/M2 | DIASTOLIC BLOOD PRESSURE: 67 MMHG | HEART RATE: 73 BPM | TEMPERATURE: 97.7 F | RESPIRATION RATE: 16 BRPM | WEIGHT: 178.2 LBS

## 2019-10-09 DIAGNOSIS — I10 BENIGN ESSENTIAL HYPERTENSION: ICD-10-CM

## 2019-10-09 DIAGNOSIS — N31.9 NEUROGENIC BLADDER: ICD-10-CM

## 2019-10-09 DIAGNOSIS — D32.1 SPINAL MENINGIOMA (H): Primary | ICD-10-CM

## 2019-10-09 LAB
ANION GAP SERPL CALCULATED.3IONS-SCNC: 10 MMOL/L (ref 5–18)
BUN SERPL-MCNC: 7 MG/DL (ref 8–22)
CALCIUM SERPL-MCNC: 8.5 MG/DL (ref 8.5–10.5)
CHLORIDE BLD-SCNC: 95 MMOL/L (ref 98–107)
CO2 SERPL-SCNC: 29 MMOL/L (ref 22–31)
CREAT SERPL-MCNC: 0.76 MG/DL (ref 0.7–1.3)
GFR SERPL CREATININE-BSD FRML MDRD: >60 ML/MIN/1.73M2
GLUCOSE BLD-MCNC: 118 MG/DL (ref 70–125)
POTASSIUM BLD-SCNC: 3.1 MMOL/L (ref 3.5–5)
SODIUM SERPL-SCNC: 134 MMOL/L (ref 136–145)

## 2019-10-09 PROCEDURE — 99309 SBSQ NF CARE MODERATE MDM 30: CPT | Performed by: NURSE PRACTITIONER

## 2019-10-09 NOTE — LETTER
10/9/2019        RE: Elaina Valenzuela  [address]  [place] [address]        [facility]  [facility] Medical Record Number:  [id number]  Place of Service where encounter took place:  [facility] ([facility]) [[id number]]  Chief Complaint   Patient presents with     Nursing Home Acute     HPI:    Elaina Valenzuela  is a 58 year old (1960), who is being seen today for an episodic care visit.  HPI information obtained from: facility chart records, facility staff, patient report and [facility] chart review. Today's concern is:    History of Spinal Meningioma s/p L4-L5 Fusion and T8-T9 Laminectomy in 2010 with Progressive Weakness/T9-10 Bilateral Laminectomy for Decompression of Central Stenosis on 9/18/19 by Dr. Munguia. Today, patient denies pain sitting in his wheelchair. Continues to make gains in therapy. Per facility report, have noticed some return in lower extremities. Upon review of documentation since previous visit 10/7/19, utilizing Oxycodone daily on average.     Neurogenic Bladder. Discharged from hospital with alvarado catheter. Failed voiding trial on 9/30/19. Today, staff removed alvarado catheter around 0600 this morning. Per staff report, patient is nearing 6 hours since alvarado catheter removed without voiding. Per patient report, denies abdominal pain.     Hypertension. Upon review of blood pressure since previous visit 10/7/19, systolic range from 101-134. Diastolic range from 67-86.    Past Medical and Surgical History reviewed in Epic today.    MEDICATIONS:  Current Outpatient Medications   Medication Sig Dispense Refill     acetaminophen (TYLENOL) 325 MG tablet Take 650 mg by mouth every 4 hours as needed for mild pain       albuterol (PROAIR HFA/PROVENTIL HFA/VENTOLIN HFA) 108 (90 Base) MCG/ACT inhaler Inhale 2 puffs into the lungs every 6 hours as needed for shortness of breath / dyspnea or wheezing 1 Inhaler 10     amitriptyline (ELAVIL) 100 MG tablet  TAKE 1 TABLET BY MOUTH EVERY DAY AT BEDTIME 90 tablet 3     atorvastatin (LIPITOR) 20 MG tablet Take 1 tablet (20 mg) by mouth daily 90 tablet 1     BENEFIBER DRINK MIX OR POWD Take 2 Tablespoonful by mouth daily        citalopram (CELEXA) 20 MG tablet Take 1 tablet (20 mg) by mouth daily 90 tablet 1     fluticasone-salmeterol (ADVAIR) 100-50 MCG/DOSE inhaler Inhale 1 puff into the lungs 2 times daily 1 Inhaler 3     gabapentin (NEURONTIN) 300 MG capsule Take 2 capsules (600 mg) by mouth 3 times daily 360 capsule 1     hydrochlorothiazide (HYDRODIURIL) 25 MG tablet TAKE 1 TABLET BY MOUTH EVERY MORNING. NO REFILLS UNTIL FOLLOW UP 90 tablet 1     methocarbamol (ROBAXIN) 750 MG tablet Take 1 tablet (750 mg) by mouth every 6 hours as needed for muscle spasms 50 tablet 1     metoprolol tartrate (LOPRESSOR) 100 MG tablet TAKE 1 TABLET(100 MG) BY MOUTH TWICE DAILY 180 tablet 2     morphine (MS CONTIN) 15 MG CR tablet Take 1 tablet (15 mg) by mouth every 12 hours 50 tablet 0     oxyCODONE (ROXICODONE) 5 MG tablet Take 1-2 tablets (5-10 mg) by mouth every 3 hours as needed for moderate to severe pain 50 tablet 0     polyethylene glycol (MIRALAX/GLYCOLAX) packet Take 17 g by mouth daily as needed for constipation       senna-docusate (SENOKOT-S/PERICOLACE) 8.6-50 MG tablet Take 1 tablet by mouth 2 times daily       tamsulosin (FLOMAX) 0.4 MG capsule Take 1 capsule (0.4 mg) by mouth daily 30 capsule 0     REVIEW OF SYSTEMS:  4 point ROS including Respiratory, CV, GI and , other than that noted in the HPI,  is negative    Objective:  /67   Pulse 73   Temp 97.7  F (36.5  C)   Resp 16   Wt 80.8 kg (178 lb 3.2 oz)   SpO2 93%   BMI 28.76 kg/m     Exam:  GENERAL APPEARANCE:  Alert, in no distress  ENT:  Mouth and posterior oropharynx normal, moist mucous membranes  EYES:  EOM, conjunctivae, lids, pupils and irises normal  RESP:  respiratory effort and palpation of chest normal, lungs clear to auscultation, no  respiratory distress  CV:  Palpation and auscultation of heart done , regular rate and rhythm, no murmur, rub, or gallop  ABDOMEN:  normal bowel sounds, soft, nontender, no hepatosplenomegaly or other masses  M/S:   Trace edema noted in BLE.   SKIN:  Did not visualize surgical incision  NEURO:   Cranial nerves 2-12 are normal tested and grossly at patient's baseline  PSYCH:  oriented to person and place     Labs:   Labs done in SNF are in [place] EPIC. Please refer to them using [employer]/Care Everywhere.    ASSESSMENT/PLAN:  History of Spinal Meningioma s/p L4-L5 Fusion and T8-T9 Laminectomy in 2010 with Progressive Weakness/T9-10 Bilateral Laminectomy for Decompression of Central Stenosis on 9/18/19 by Dr. Munguia. Follow-up with Juilana Cardenas PA-C, on 10/31/19 as scheduled. MS Contin, Oxycodone and Methocarbamol available for pain control. Also on Gabapentin and Amitriptyline for numbness. On no DVT prophylaxis and staff at facility [facility] regarding this. Physical and Occupational Therapy ordered for deconditioning.     Neurogenic Bladder. Failed voiding trial on 9/30/19. Repeat voiding trial starting today with PVR x 72 hours. If unsuccessful, will need to re-insert alvarado catheter and follow-up with Urology. Taking Tamsulosin    Hypertension. Most blood pressures < 140/90. Monitor blood pressure daily. Continue Metoprolol and Hydrochlorothiazide as ordered.    Electronically signed by:  AMIE Mondragon CNP             Sincerely,        AMIE Mondragon CNP

## 2019-10-09 NOTE — PROGRESS NOTES
[facility]  [place] Medical Record Number:  [id number]  Place of Service where encounter took place:  [facility] ([facility]) [[id number]]  Chief Complaint   Patient presents with     Nursing Home Acute     HPI:    Elaina Valenzuela  is a 58 year old (1960), who is being seen today for an episodic care visit.  HPI information obtained from: facility chart records, facility staff, patient report and [facility] chart review. Today's concern is:    History of Spinal Meningioma s/p L4-L5 Fusion and T8-T9 Laminectomy in 2010 with Progressive Weakness/T9-10 Bilateral Laminectomy for Decompression of Central Stenosis on 9/18/19 by Dr. Munguia. Today, patient denies pain sitting in his wheelchair. Continues to make gains in therapy. Per facility report, have noticed some return in lower extremities. Upon review of documentation since previous visit 10/7/19, utilizing Oxycodone daily on average.     Neurogenic Bladder. Discharged from hospital with alvarado catheter. Failed voiding trial on 9/30/19. Today, staff removed alvarado catheter around 0600 this morning. Per staff report, patient is nearing 6 hours since alvarado catheter removed without voiding. Per patient report, denies abdominal pain.     Hypertension. Upon review of blood pressure since previous visit 10/7/19, systolic range from 101-134. Diastolic range from 67-86.    Past Medical and Surgical History reviewed in Epic today.    MEDICATIONS:  Current Outpatient Medications   Medication Sig Dispense Refill     acetaminophen (TYLENOL) 325 MG tablet Take 650 mg by mouth every 4 hours as needed for mild pain       albuterol (PROAIR HFA/PROVENTIL HFA/VENTOLIN HFA) 108 (90 Base) MCG/ACT inhaler Inhale 2 puffs into the lungs every 6 hours as needed for shortness of breath / dyspnea or wheezing 1 Inhaler 10     amitriptyline (ELAVIL) 100 MG tablet TAKE 1 TABLET BY MOUTH EVERY DAY AT BEDTIME 90 tablet 3     atorvastatin (LIPITOR) 20 MG tablet  Take 1 tablet (20 mg) by mouth daily 90 tablet 1     BENEFIBER DRINK MIX OR POWD Take 2 Tablespoonful by mouth daily        citalopram (CELEXA) 20 MG tablet Take 1 tablet (20 mg) by mouth daily 90 tablet 1     fluticasone-salmeterol (ADVAIR) 100-50 MCG/DOSE inhaler Inhale 1 puff into the lungs 2 times daily 1 Inhaler 3     gabapentin (NEURONTIN) 300 MG capsule Take 2 capsules (600 mg) by mouth 3 times daily 360 capsule 1     hydrochlorothiazide (HYDRODIURIL) 25 MG tablet TAKE 1 TABLET BY MOUTH EVERY MORNING. NO REFILLS UNTIL FOLLOW UP 90 tablet 1     methocarbamol (ROBAXIN) 750 MG tablet Take 1 tablet (750 mg) by mouth every 6 hours as needed for muscle spasms 50 tablet 1     metoprolol tartrate (LOPRESSOR) 100 MG tablet TAKE 1 TABLET(100 MG) BY MOUTH TWICE DAILY 180 tablet 2     morphine (MS CONTIN) 15 MG CR tablet Take 1 tablet (15 mg) by mouth every 12 hours 50 tablet 0     oxyCODONE (ROXICODONE) 5 MG tablet Take 1-2 tablets (5-10 mg) by mouth every 3 hours as needed for moderate to severe pain 50 tablet 0     polyethylene glycol (MIRALAX/GLYCOLAX) packet Take 17 g by mouth daily as needed for constipation       senna-docusate (SENOKOT-S/PERICOLACE) 8.6-50 MG tablet Take 1 tablet by mouth 2 times daily       tamsulosin (FLOMAX) 0.4 MG capsule Take 1 capsule (0.4 mg) by mouth daily 30 capsule 0     REVIEW OF SYSTEMS:  4 point ROS including Respiratory, CV, GI and , other than that noted in the HPI,  is negative    Objective:  /67   Pulse 73   Temp 97.7  F (36.5  C)   Resp 16   Wt 80.8 kg (178 lb 3.2 oz)   SpO2 93%   BMI 28.76 kg/m    Exam:  GENERAL APPEARANCE:  Alert, in no distress  ENT:  Mouth and posterior oropharynx normal, moist mucous membranes  EYES:  EOM, conjunctivae, lids, pupils and irises normal  RESP:  respiratory effort and palpation of chest normal, lungs clear to auscultation, no respiratory distress  CV:  Palpation and auscultation of heart done , regular rate and rhythm, no murmur,  rub, or gallop  ABDOMEN:  normal bowel sounds, soft, nontender, no hepatosplenomegaly or other masses  M/S:   Trace edema noted in BLE.   SKIN:  Did not visualize surgical incision  NEURO:   Cranial nerves 2-12 are normal tested and grossly at patient's baseline  PSYCH:  oriented to person and place     Labs:   Labs done in SNF are in [place] EPIC. Please refer to them using [employer]IC/Care Everywhere.    ASSESSMENT/PLAN:  History of Spinal Meningioma s/p L4-L5 Fusion and T8-T9 Laminectomy in 2010 with Progressive Weakness/T9-10 Bilateral Laminectomy for Decompression of Central Stenosis on 9/18/19 by Dr. Munguia. Follow-up with Juliana Cardenas PA-C, on 10/31/19 as scheduled. MS Contin, Oxycodone and Methocarbamol available for pain control. Also on Gabapentin and Amitriptyline for numbness. On no DVT prophylaxis and staff at facility updated [facility] regarding this. Physical and Occupational Therapy ordered for deconditioning.     Neurogenic Bladder. Failed voiding trial on 9/30/19. Repeat voiding trial starting today with PVR x 72 hours. If unsuccessful, will need to re-insert alvarado catheter and follow-up with Urology. Taking Tamsulosin    Hypertension. Most blood pressures < 140/90. Monitor blood pressure daily. Continue Metoprolol and Hydrochlorothiazide as ordered.    Electronically signed by:  AMIE Mondragon CNP

## 2019-10-14 ENCOUNTER — NURSING HOME VISIT (OUTPATIENT)
Dept: GERIATRICS | Facility: CLINIC | Age: 59
End: 2019-10-14
Payer: COMMERCIAL

## 2019-10-14 VITALS
RESPIRATION RATE: 16 BRPM | WEIGHT: 178.4 LBS | HEART RATE: 82 BPM | OXYGEN SATURATION: 92 % | BODY MASS INDEX: 28.79 KG/M2 | TEMPERATURE: 98 F | DIASTOLIC BLOOD PRESSURE: 88 MMHG | SYSTOLIC BLOOD PRESSURE: 127 MMHG

## 2019-10-14 DIAGNOSIS — N31.9 NEUROGENIC BLADDER: ICD-10-CM

## 2019-10-14 DIAGNOSIS — D32.1 SPINAL MENINGIOMA (H): Primary | ICD-10-CM

## 2019-10-14 DIAGNOSIS — Z98.890 S/P LAMINECTOMY: ICD-10-CM

## 2019-10-14 DIAGNOSIS — R33.9 URINARY RETENTION: ICD-10-CM

## 2019-10-14 DIAGNOSIS — Z98.1 S/P LUMBAR FUSION: ICD-10-CM

## 2019-10-14 DIAGNOSIS — E87.6 HYPOKALEMIA: ICD-10-CM

## 2019-10-14 PROCEDURE — 99309 SBSQ NF CARE MODERATE MDM 30: CPT | Performed by: NURSE PRACTITIONER

## 2019-10-14 RX ORDER — POTASSIUM CHLORIDE 1500 MG/1
20 TABLET, EXTENDED RELEASE ORAL DAILY
Qty: 30 TABLET | Refills: 3
Start: 2019-10-14 | End: 2019-10-22

## 2019-10-14 NOTE — PROGRESS NOTES
[facility]  [place] Medical Record Number:  [id number]  Place of Service where encounter took place:  [facility] ([facility] [[id number]]  Chief Complaint   Patient presents with     RECHECK       HPI:    Elaina Valenzuela  is a 58 year old (1960), who is being seen today for an episodic care visit.  HPI information obtained from: facility chart records, facility staff, patient report and [facility] chart review. Today's concern is:     Spinal meningioma (H)  S/P laminectomy  S/P lumbar fusion  Hypokalemia  Neurogenic bladder  Urinary retention     Patient seen in room for follow up, pleasant, denies pain, working with therapies, goal is to be able to walk again, spoke with therapy team and thought is he will be able to self transfer at some point, has abrasions on knees where he hits doorways with wheelchair, overall status stable, no acute concerns.    Past Medical and Surgical History reviewed in Epic today.    MEDICATIONS:  Current Outpatient Medications   Medication Sig Dispense Refill     acetaminophen (TYLENOL) 325 MG tablet Take 650 mg by mouth every 4 hours as needed for mild pain       albuterol (PROAIR HFA/PROVENTIL HFA/VENTOLIN HFA) 108 (90 Base) MCG/ACT inhaler Inhale 2 puffs into the lungs every 6 hours as needed for shortness of breath / dyspnea or wheezing 1 Inhaler 10     amitriptyline (ELAVIL) 100 MG tablet TAKE 1 TABLET BY MOUTH EVERY DAY AT BEDTIME 90 tablet 3     atorvastatin (LIPITOR) 20 MG tablet Take 1 tablet (20 mg) by mouth daily 90 tablet 1     BENEFIBER DRINK MIX OR POWD Take 2 Tablespoonful by mouth daily        citalopram (CELEXA) 20 MG tablet Take 1 tablet (20 mg) by mouth daily 90 tablet 1     fluticasone-salmeterol (ADVAIR) 100-50 MCG/DOSE inhaler Inhale 1 puff into the lungs 2 times daily 1 Inhaler 3     gabapentin (NEURONTIN) 300 MG capsule Take 2 capsules (600 mg) by mouth 3 times daily 360 capsule 1     hydrochlorothiazide (HYDRODIURIL) 25  MG tablet TAKE 1 TABLET BY MOUTH EVERY MORNING. NO REFILLS UNTIL FOLLOW UP 90 tablet 1     methocarbamol (ROBAXIN) 750 MG tablet Take 1 tablet (750 mg) by mouth every 6 hours as needed for muscle spasms 50 tablet 1     metoprolol tartrate (LOPRESSOR) 100 MG tablet TAKE 1 TABLET(100 MG) BY MOUTH TWICE DAILY 180 tablet 2     morphine (MS CONTIN) 15 MG CR tablet Take 1 tablet (15 mg) by mouth every 12 hours 50 tablet 0     oxyCODONE (ROXICODONE) 5 MG tablet Take 1-2 tablets (5-10 mg) by mouth every 3 hours as needed for moderate to severe pain 50 tablet 0     polyethylene glycol (MIRALAX/GLYCOLAX) packet Take 17 g by mouth daily as needed for constipation       senna-docusate (SENOKOT-S/PERICOLACE) 8.6-50 MG tablet Take 1 tablet by mouth 2 times daily       tamsulosin (FLOMAX) 0.4 MG capsule Take 1 capsule (0.4 mg) by mouth daily 30 capsule 0       REVIEW OF SYSTEMS:  4 point ROS including Respiratory, CV, GI and , other than that noted in the HPI,  is negative    Objective:  /88   Pulse 82   Temp 98  F (36.7  C)   Resp 16   Wt 80.9 kg (178 lb 6.4 oz)   SpO2 92%   BMI 28.79 kg/m    Exam:  GENERAL APPEARANCE:  in no distress, appears healthy  ENT:  Mouth and posterior oropharynx normal, moist mucous membranes  RESP:  lungs clear to auscultation   CV:  regular rate and rhythm, no murmur, rub, or gallop, peripheral edema scant-1+ in lower legs/pedal  ABDOMEN:  bowel sounds normal  M/S:   Gait and station abnormal non-ambulatory  SKIN:  Inspection of skin and subcutaneous tissueWNL, small knee abrasions  NEURO:   Examination of sensation by touch normal  PSYCH:  oriented to self, affect and mood normal    Labs:   Labs done in SNF are in [place] EPIC. Please refer to them using EPIC/Care Everywhere.    ASSESSMENT/PLAN:  Spinal meningioma (H)  S/P laminectomy  S/P lumbar fusion  -labs have been generally WNL  -continue PT/OT   -continue oxycontin BID with oxycodone PRN  -CBC on 10/21  -follow up with  neurosurgery clinc on 10/31    Hypokalemia  -recent K levels 3.6 on 9/30 and 3.3 on 10/3  - potassium chloride ER (K-TAB) 20 MEQ CR tablet; Take 1 tablet (20 mEq) by mouth daily  -BMP on 10/21    Neurogenic bladder  Urinary retention  -had alvarado, removed for trial void on 10/7  -urinating WNL, SCath PRN if indicated  -continue tamsulosin as scheduled          Electronically signed by:  AMIE Boone CNP

## 2019-10-14 NOTE — LETTER
10/14/2019        RE: Elaina Valenzuela  [address]  [place] [address]        [facility]  [place] Medical Record Number:  [id number]  Place of Service where encounter took place:  [facility] [[id number]]  Chief Complaint   Patient presents with     RECHECK       HPI:    Elaina Valenzuela  is a 58 year old (1960), who is being seen today for an episodic care visit.  HPI information obtained from: facility chart records, facility staff, patient report and [facility] chart review. Today's concern is:     Spinal meningioma (H)  S/P laminectomy  S/P lumbar fusion  Hypokalemia  Neurogenic bladder  Urinary retention     Patient seen in room for follow up, pleasant, denies pain, working with therapies, goal is to be able to walk again, spoke with therapy team and thought is he will be able to self transfer at some point, has abrasions on knees where he hits doorways with wheelchair, overall status stable, no acute concerns.    Past Medical and Surgical History reviewed in Epic today.    MEDICATIONS:  Current Outpatient Medications   Medication Sig Dispense Refill     acetaminophen (TYLENOL) 325 MG tablet Take 650 mg by mouth every 4 hours as needed for mild pain       albuterol (PROAIR HFA/PROVENTIL HFA/VENTOLIN HFA) 108 (90 Base) MCG/ACT inhaler Inhale 2 puffs into the lungs every 6 hours as needed for shortness of breath / dyspnea or wheezing 1 Inhaler 10     amitriptyline (ELAVIL) 100 MG tablet TAKE 1 TABLET BY MOUTH EVERY DAY AT BEDTIME 90 tablet 3     atorvastatin (LIPITOR) 20 MG tablet Take 1 tablet (20 mg) by mouth daily 90 tablet 1     BENEFIBER DRINK MIX OR POWD Take 2 Tablespoonful by mouth daily        citalopram (CELEXA) 20 MG tablet Take 1 tablet (20 mg) by mouth daily 90 tablet 1     fluticasone-salmeterol (ADVAIR) 100-50 MCG/DOSE inhaler Inhale 1 puff into the lungs 2 times daily 1 Inhaler 3     gabapentin (NEURONTIN) 300 MG capsule Take 2  capsules (600 mg) by mouth 3 times daily 360 capsule 1     hydrochlorothiazide (HYDRODIURIL) 25 MG tablet TAKE 1 TABLET BY MOUTH EVERY MORNING. NO REFILLS UNTIL FOLLOW UP 90 tablet 1     methocarbamol (ROBAXIN) 750 MG tablet Take 1 tablet (750 mg) by mouth every 6 hours as needed for muscle spasms 50 tablet 1     metoprolol tartrate (LOPRESSOR) 100 MG tablet TAKE 1 TABLET(100 MG) BY MOUTH TWICE DAILY 180 tablet 2     morphine (MS CONTIN) 15 MG CR tablet Take 1 tablet (15 mg) by mouth every 12 hours 50 tablet 0     oxyCODONE (ROXICODONE) 5 MG tablet Take 1-2 tablets (5-10 mg) by mouth every 3 hours as needed for moderate to severe pain 50 tablet 0     polyethylene glycol (MIRALAX/GLYCOLAX) packet Take 17 g by mouth daily as needed for constipation       senna-docusate (SENOKOT-S/PERICOLACE) 8.6-50 MG tablet Take 1 tablet by mouth 2 times daily       tamsulosin (FLOMAX) 0.4 MG capsule Take 1 capsule (0.4 mg) by mouth daily 30 capsule 0       REVIEW OF SYSTEMS:  4 point ROS including Respiratory, CV, GI and , other than that noted in the HPI,  is negative    Objective:  /88   Pulse 82   Temp 98  F (36.7  C)   Resp 16   Wt 80.9 kg (178 lb 6.4 oz)   SpO2 92%   BMI 28.79 kg/m     Exam:  GENERAL APPEARANCE:  in no distress, appears healthy  ENT:  Mouth and posterior oropharynx normal, moist mucous membranes  RESP:  lungs clear to auscultation   CV:  regular rate and rhythm, no murmur, rub, or gallop, peripheral edema scant-1+ in lower legs/pedal  ABDOMEN:  bowel sounds normal  M/S:   Gait and station abnormal non-ambulatory  SKIN:  Inspection of skin and subcutaneous tissueWNL, small knee abrasions  NEURO:   Examination of sensation by touch normal  PSYCH:  oriented to self, affect and mood normal    Labs:   Labs done in SNF are in [place] EPIC. Please refer to them using [employer]/Care Everywhere.    ASSESSMENT/PLAN:  Spinal meningioma (H)  S/P laminectomy  S/P lumbar fusion  -labs have been generally  WNL  -continue PT/OT   -continue oxycontin BID with oxycodone PRN  -CBC on 10/21  -follow up with neurosurgery clinc on 10/31    Hypokalemia  -recent K levels 3.6 on 9/30 and 3.3 on 10/3  - potassium chloride ER (K-TAB) 20 MEQ CR tablet; Take 1 tablet (20 mEq) by mouth daily  -BMP on 10/21    Neurogenic bladder  Urinary retention  -had alvarado, removed for trial void on 10/7  -urinating WNL, SCath PRN if indicated  -continue tamsulosin as scheduled          Electronically signed by:  AMIE Boone CNP               Sincerely,        AMIE Boone CNP

## 2019-10-21 ENCOUNTER — NURSING HOME VISIT (OUTPATIENT)
Dept: GERIATRICS | Facility: CLINIC | Age: 59
End: 2019-10-21
Payer: COMMERCIAL

## 2019-10-21 ENCOUNTER — TRANSFERRED RECORDS (OUTPATIENT)
Dept: HEALTH INFORMATION MANAGEMENT | Facility: CLINIC | Age: 59
End: 2019-10-21

## 2019-10-21 ENCOUNTER — RECORDS - HEALTHEAST (OUTPATIENT)
Dept: LAB | Facility: CLINIC | Age: 59
End: 2019-10-21

## 2019-10-21 VITALS
HEART RATE: 91 BPM | WEIGHT: 178.4 LBS | DIASTOLIC BLOOD PRESSURE: 85 MMHG | RESPIRATION RATE: 16 BRPM | TEMPERATURE: 97.6 F | OXYGEN SATURATION: 90 % | SYSTOLIC BLOOD PRESSURE: 131 MMHG | BODY MASS INDEX: 28.79 KG/M2

## 2019-10-21 DIAGNOSIS — Z98.890 S/P LAMINECTOMY: ICD-10-CM

## 2019-10-21 DIAGNOSIS — E87.6 HYPOKALEMIA: ICD-10-CM

## 2019-10-21 DIAGNOSIS — N31.9 NEUROGENIC BLADDER: ICD-10-CM

## 2019-10-21 DIAGNOSIS — D32.1 SPINAL MENINGIOMA (H): Primary | ICD-10-CM

## 2019-10-21 DIAGNOSIS — E87.1 HYPONATREMIA: ICD-10-CM

## 2019-10-21 LAB
ANION GAP SERPL CALCULATED.3IONS-SCNC: 10 MMOL/L (ref 5–18)
ANION GAP SERPL CALCULATED.3IONS-SCNC: 10 MMOL/L (ref 5–18)
BUN SERPL-MCNC: 21 MG/DL (ref 8–22)
BUN SERPL-MCNC: 21 MG/DL (ref 8–22)
CALCIUM SERPL-MCNC: 8.5 MG/DL (ref 8.5–10.5)
CALCIUM SERPL-MCNC: 8.5 MG/DL (ref 8.5–10.5)
CHLORIDE BLD-SCNC: 98 MMOL/L (ref 98–107)
CHLORIDE SERPLBLD-SCNC: 98 MMOL/L (ref 98–107)
CO2 SERPL-SCNC: 26 MMOL/L (ref 22–31)
CO2 SERPL-SCNC: 26 MMOL/L (ref 22–31)
CREAT SERPL-MCNC: 1.39 MG/DL (ref 0.7–1.3)
CREAT SERPL-MCNC: 1.39 MG/DL (ref 0.7–1.3)
ERYTHROCYTE [DISTWIDTH] IN BLOOD BY AUTOMATED COUNT: 14.4 % (ref 11–14.5)
ERYTHROCYTE [DISTWIDTH] IN BLOOD BY AUTOMATED COUNT: 14.4 % (ref 11–14.5)
GFR SERPL CREATININE-BSD FRML MDRD: 53 ML/MIN/1.73M2
GFR SERPL CREATININE-BSD FRML MDRD: 53 ML/MIN/1.73M2
GLUCOSE BLD-MCNC: 102 MG/DL (ref 70–125)
GLUCOSE SERPL-MCNC: 102 MG/DL (ref 70–125)
HCT VFR BLD AUTO: 37.3 % (ref 40–54)
HCT VFR BLD AUTO: 37.3 % (ref 40–54)
HEMOGLOBIN: 11.5 G/DL (ref 14–18)
HGB BLD-MCNC: 11.5 G/DL (ref 14–18)
MCH RBC QN AUTO: 26.4 PG (ref 27–34)
MCH RBC QN AUTO: 26.4 PG (ref 27–34)
MCHC RBC AUTO-ENTMCNC: 30.8 G/DL (ref 32–36)
MCHC RBC AUTO-ENTMCNC: 30.8 G/DL (ref 32–36)
MCV RBC AUTO: 86 FL (ref 80–100)
MCV RBC AUTO: 86 FL (ref 80–100)
PLATELET # BLD AUTO: 289 THOU/UL (ref 140–440)
PLATELET # BLD AUTO: 289 THOU/UL (ref 140–440)
PMV BLD AUTO: 9.5 FL (ref 8.5–12.5)
POTASSIUM BLD-SCNC: 4.6 MMOL/L (ref 3.5–5)
POTASSIUM SERPL-SCNC: 4.6 MMOL/L (ref 3.5–5)
RBC # BLD AUTO: 4.36 MILL/UL (ref 4.4–6.2)
RBC # BLD AUTO: 4.36 MILL/UL (ref 4.4–6.2)
SODIUM SERPL-SCNC: 134 MMOL/L (ref 136–145)
SODIUM SERPL-SCNC: 134 MMOL/L (ref 136–145)
WBC # BLD AUTO: 9.3 THOU/UL (ref 4–11)
WBC: 9.3 THOU/UL (ref 4–11)

## 2019-10-21 PROCEDURE — 99309 SBSQ NF CARE MODERATE MDM 30: CPT | Performed by: NURSE PRACTITIONER

## 2019-10-21 NOTE — LETTER
10/21/2019        RE: Elaina Valenzuela  [address]  [place] [address]        [facility]  [place] Medical Record Number:  [id number]  Place of Service where encounter took place:  [facility] ([facility] [[id number]]  Chief Complaint   Patient presents with     Nursing Home Acute     HPI:    Elaina Valenzuela  is a 58 year old (1960), who is being seen today for an episodic care visit.  HPI information obtained from: facility chart records, facility staff, patient report and [facility] chart review. Today's concern is:    History of Spinal Meningioma s/p L4-L5 Fusion and T8-T9 Laminectomy in 2010 with Progressive Weakness/T9-10 Bilateral Laminectomy for Decompression of Central Stenosis on 9/18/19 by Dr. Munguia. Today, patient denies pain at rest. Feels therapy is going well. Per review documentation, maximum assistance for sliding board transfers to move lower extremities and moderate assistance to stabilizer upper body. Transfers with EZ stand. Moderate assistance x 2 with bed mobility. Working on log rolling. Maximum assistance to reposition in wheelchair. Modified independence with wheelchair mobility. Set-up for grooming/hygiene. Set-up for upper extremities. Maximum assistance with lower extremities. Per review of documentation, has not utilized Oxycodone over the past week. Has utilized Methocarbamol 1 time in the past 2 weeks.    Hypokalemia.Potassium 3.3 on 10/3/19. Received Potassium Chloride 20 mEq x 1. On 10/14/19, seen by covering NP and started on daily Potassium Chloride supplement. Today, Potassium 4.6.     Hyponatremia. Sodium 134 on 10/21/19. Previous Sodium 136 on 10/3/19.     Neurogenic Bladder. Discharged from hospital with alvarado catheter. Failed voiding trial on 9/30/19. Re-attempted voiding trial on 10/9/19.  on 10/9/19, 157 on 10/10/19 and 285 on 10/11/19.    Past Medical and Surgical History reviewed in Epic  today.    MEDICATIONS:  Current Outpatient Medications   Medication Sig Dispense Refill     acetaminophen (TYLENOL) 325 MG tablet Take 650 mg by mouth every 4 hours as needed for mild pain       albuterol (PROAIR HFA/PROVENTIL HFA/VENTOLIN HFA) 108 (90 Base) MCG/ACT inhaler Inhale 2 puffs into the lungs every 6 hours as needed for shortness of breath / dyspnea or wheezing 1 Inhaler 10     amitriptyline (ELAVIL) 100 MG tablet TAKE 1 TABLET BY MOUTH EVERY DAY AT BEDTIME 90 tablet 3     atorvastatin (LIPITOR) 20 MG tablet Take 1 tablet (20 mg) by mouth daily 90 tablet 1     BENEFIBER DRINK MIX OR POWD Take 2 Tablespoonful by mouth daily        citalopram (CELEXA) 20 MG tablet Take 1 tablet (20 mg) by mouth daily 90 tablet 1     fluticasone-salmeterol (ADVAIR) 100-50 MCG/DOSE inhaler Inhale 1 puff into the lungs 2 times daily 1 Inhaler 3     gabapentin (NEURONTIN) 300 MG capsule Take 2 capsules (600 mg) by mouth 3 times daily 360 capsule 1     hydrochlorothiazide (HYDRODIURIL) 25 MG tablet TAKE 1 TABLET BY MOUTH EVERY MORNING. NO REFILLS UNTIL FOLLOW UP 90 tablet 1     methocarbamol (ROBAXIN) 750 MG tablet Take 1 tablet (750 mg) by mouth every 6 hours as needed for muscle spasms 50 tablet 1     metoprolol tartrate (LOPRESSOR) 100 MG tablet TAKE 1 TABLET(100 MG) BY MOUTH TWICE DAILY 180 tablet 2     morphine (MS CONTIN) 15 MG CR tablet Take 1 tablet (15 mg) by mouth daily 30 tablet 0     oxyCODONE (ROXICODONE) 5 MG tablet Take 1-2 tablets (5-10 mg) by mouth every 3 hours as needed for moderate to severe pain 50 tablet 0     polyethylene glycol (MIRALAX/GLYCOLAX) packet Take 17 g by mouth daily as needed for constipation       potassium chloride ER (K-TAB) 20 MEQ CR tablet Take 1 tablet (20 mEq) by mouth daily 30 tablet 3     senna-docusate (SENOKOT-S/PERICOLACE) 8.6-50 MG tablet Take 1 tablet by mouth 2 times daily       tamsulosin (FLOMAX) 0.4 MG capsule Take 1 capsule (0.4 mg) by mouth daily 30 capsule 0     REVIEW OF  SYSTEMS:  4 point ROS including Respiratory, CV, GI and , other than that noted in the HPI,  is negative    Objective:  /85   Pulse 91   Temp 97.6  F (36.4  C)   Resp 16   Wt 80.9 kg (178 lb 6.4 oz)   SpO2 90%   BMI 28.79 kg/m     Exam:  GENERAL APPEARANCE:  Alert, in no distress  ENT:  Mouth and posterior oropharynx normal, moist mucous membranes  EYES:  EOM, conjunctivae, lids, pupils and irises normal  RESP:  respiratory effort and palpation of chest normal, lungs clear to auscultation, no respiratory distress  CV:  Palpation and auscultation of heart done , regular rate and rhythm, no murmur, rub, or gallop  ABDOMEN:  normal bowel sounds, soft, nontender, no hepatosplenomegaly or other masses  M/S:   Trace edema noted in BLE.   SKIN:  Did not visualize surgical incision  NEURO:   Cranial nerves 2-12 are normal tested and grossly at patient's baseline  PSYCH:  oriented to person and place    Labs:   Labs done in SNF are in [place] EPIC. Please refer to them using [employer]/Care Everywhere.    ASSESSMENT/PLAN:  History of Spinal Meningioma s/p L4-L5 Fusion and T8-T9 Laminectomy in 2010 with Progressive Weakness/T9-10 Bilateral Laminectomy for Decompression of Central Stenosis on 9/18/19 by Dr. Munguia. Follow-up with Juliana Cardenas PA-C, on 10/31/19 as scheduled. MS Contin, Oxycodone and Methocarbamol available for pain control. Due to non-use of Oxycodone and Methocarbamol, will decrease scheduled MS Contin from 15 mg BID to every day. Also on Gabapentin and Amitriptyline for numbness. On no DVT prophylaxis and staff at facility [facility] regarding this. Physical and Occupational Therapy ordered for deconditioning.     Hypokalemia. Mild and resolved. Will discontinue daily Potassium supplement. Repeat BMP on 10/28/19 to ensure stability.    Neurogenic Bladder. With successful voiding trial 10/9/19. Bladder scan PRN as indicated. Continue Tamsulosin as ordered.     Hyponatremia. Mild.  Likely secondary to Hydrochlorothiazide. Intake at meals appears to be adequate. Is on Citalopram and has been on since 2015 so unlikely etiology. Repeat BMP on 10/28/19 to ensure stability.     Electronically signed by:  AMIE Mondragon CNP             Sincerely,        AMIE Mondragon CNP

## 2019-10-21 NOTE — PROGRESS NOTES
[facility]  [place] Medical Record Number:  [id number]  Place of Service where encounter took place:  [facility] [[id number]]  Chief Complaint   Patient presents with     Nursing Home Acute     HPI:    Elaina Valenzuela  is a 58 year old (1960), who is being seen today for an episodic care visit.  HPI information obtained from: facility chart records, facility staff, patient report and [facility] chart review. Today's concern is:    History of Spinal Meningioma s/p L4-L5 Fusion and T8-T9 Laminectomy in 2010 with Progressive Weakness/T9-10 Bilateral Laminectomy for Decompression of Central Stenosis on 9/18/19 by Dr. Munguia. Today, patient denies pain at rest. Feels therapy is going well. Per review documentation, maximum assistance for sliding board transfers to move lower extremities and moderate assistance to stabilizer upper body. Transfers with EZ stand. Moderate assistance x 2 with bed mobility. Working on log rolling. Maximum assistance to reposition in wheelchair. Modified independence with wheelchair mobility. Set-up for grooming/hygiene. Set-up for upper extremities. Maximum assistance with lower extremities. Per review of documentation, has not utilized Oxycodone over the past week. Has utilized Methocarbamol 1 time in the past 2 weeks.    Hypokalemia.Potassium 3.3 on 10/3/19. Received Potassium Chloride 20 mEq x 1. On 10/14/19, seen by covering NP and started on daily Potassium Chloride supplement. Today, Potassium 4.6.     Hyponatremia. Sodium 134 on 10/21/19. Previous Sodium 136 on 10/3/19.     Neurogenic Bladder. Discharged from hospital with alvarado catheter. Failed voiding trial on 9/30/19. Re-attempted voiding trial on 10/9/19.  on 10/9/19, 157 on 10/10/19 and 285 on 10/11/19.    Past Medical and Surgical History reviewed in Epic today.    MEDICATIONS:  Current Outpatient Medications   Medication Sig Dispense Refill     acetaminophen (TYLENOL)  325 MG tablet Take 650 mg by mouth every 4 hours as needed for mild pain       albuterol (PROAIR HFA/PROVENTIL HFA/VENTOLIN HFA) 108 (90 Base) MCG/ACT inhaler Inhale 2 puffs into the lungs every 6 hours as needed for shortness of breath / dyspnea or wheezing 1 Inhaler 10     amitriptyline (ELAVIL) 100 MG tablet TAKE 1 TABLET BY MOUTH EVERY DAY AT BEDTIME 90 tablet 3     atorvastatin (LIPITOR) 20 MG tablet Take 1 tablet (20 mg) by mouth daily 90 tablet 1     BENEFIBER DRINK MIX OR POWD Take 2 Tablespoonful by mouth daily        citalopram (CELEXA) 20 MG tablet Take 1 tablet (20 mg) by mouth daily 90 tablet 1     fluticasone-salmeterol (ADVAIR) 100-50 MCG/DOSE inhaler Inhale 1 puff into the lungs 2 times daily 1 Inhaler 3     gabapentin (NEURONTIN) 300 MG capsule Take 2 capsules (600 mg) by mouth 3 times daily 360 capsule 1     hydrochlorothiazide (HYDRODIURIL) 25 MG tablet TAKE 1 TABLET BY MOUTH EVERY MORNING. NO REFILLS UNTIL FOLLOW UP 90 tablet 1     methocarbamol (ROBAXIN) 750 MG tablet Take 1 tablet (750 mg) by mouth every 6 hours as needed for muscle spasms 50 tablet 1     metoprolol tartrate (LOPRESSOR) 100 MG tablet TAKE 1 TABLET(100 MG) BY MOUTH TWICE DAILY 180 tablet 2     morphine (MS CONTIN) 15 MG CR tablet Take 1 tablet (15 mg) by mouth daily 30 tablet 0     oxyCODONE (ROXICODONE) 5 MG tablet Take 1-2 tablets (5-10 mg) by mouth every 3 hours as needed for moderate to severe pain 50 tablet 0     polyethylene glycol (MIRALAX/GLYCOLAX) packet Take 17 g by mouth daily as needed for constipation       potassium chloride ER (K-TAB) 20 MEQ CR tablet Take 1 tablet (20 mEq) by mouth daily 30 tablet 3     senna-docusate (SENOKOT-S/PERICOLACE) 8.6-50 MG tablet Take 1 tablet by mouth 2 times daily       tamsulosin (FLOMAX) 0.4 MG capsule Take 1 capsule (0.4 mg) by mouth daily 30 capsule 0     REVIEW OF SYSTEMS:  4 point ROS including Respiratory, CV, GI and , other than that noted in the HPI,  is  negative    Objective:  /85   Pulse 91   Temp 97.6  F (36.4  C)   Resp 16   Wt 80.9 kg (178 lb 6.4 oz)   SpO2 90%   BMI 28.79 kg/m    Exam:  GENERAL APPEARANCE:  Alert, in no distress  ENT:  Mouth and posterior oropharynx normal, moist mucous membranes  EYES:  EOM, conjunctivae, lids, pupils and irises normal  RESP:  respiratory effort and palpation of chest normal, lungs clear to auscultation, no respiratory distress  CV:  Palpation and auscultation of heart done , regular rate and rhythm, no murmur, rub, or gallop  ABDOMEN:  normal bowel sounds, soft, nontender, no hepatosplenomegaly or other masses  M/S:   Trace edema noted in BLE.   SKIN:  Did not visualize surgical incision  NEURO:   Cranial nerves 2-12 are normal tested and grossly at patient's baseline  PSYCH:  oriented to person and place    Labs:   Labs done in SNF are in [place] EPIC. Please refer to them using [employer] Everywhere.    ASSESSMENT/PLAN:  History of Spinal Meningioma s/p L4-L5 Fusion and T8-T9 Laminectomy in 2010 with Progressive Weakness/T9-10 Bilateral Laminectomy for Decompression of Central Stenosis on 9/18/19 by Dr. Munguia. Follow-up with Juliana Cardenas PA-C, on 10/31/19 as scheduled. MS Contin, Oxycodone and Methocarbamol available for pain control. Due to non-use of Oxycodone and Methocarbamol, will decrease scheduled MS Contin from 15 mg BID to every day. Also on Gabapentin and Amitriptyline for numbness. On no DVT prophylaxis and staff at facility [facility] regarding this. Physical and Occupational Therapy ordered for deconditioning.     Hypokalemia. Mild and resolved. Will discontinue daily Potassium supplement. Repeat BMP on 10/28/19 to ensure stability.    Neurogenic Bladder. With successful voiding trial 10/9/19. Bladder scan PRN as indicated. Continue Tamsulosin as ordered.     Hyponatremia. Mild. Likely secondary to Hydrochlorothiazide. Intake at meals appears to be adequate. Is on Citalopram and  has been on since 2015 so unlikely etiology. Repeat BMP on 10/28/19 to ensure stability.     Electronically signed by:  AMIE Mondragon CNP

## 2019-10-22 RX ORDER — MORPHINE SULFATE 15 MG/1
15 TABLET, FILM COATED, EXTENDED RELEASE ORAL DAILY
Qty: 30 TABLET | Refills: 0 | Status: SHIPPED | OUTPATIENT
Start: 2019-10-22 | End: 2019-10-31

## 2019-10-24 ENCOUNTER — NURSING HOME VISIT (OUTPATIENT)
Dept: GERIATRICS | Facility: CLINIC | Age: 59
End: 2019-10-24
Payer: COMMERCIAL

## 2019-10-24 VITALS
BODY MASS INDEX: 28.79 KG/M2 | RESPIRATION RATE: 18 BRPM | WEIGHT: 178.4 LBS | HEART RATE: 81 BPM | SYSTOLIC BLOOD PRESSURE: 136 MMHG | TEMPERATURE: 97.9 F | OXYGEN SATURATION: 96 % | DIASTOLIC BLOOD PRESSURE: 87 MMHG

## 2019-10-24 DIAGNOSIS — N17.9 ACUTE KIDNEY INJURY (H): ICD-10-CM

## 2019-10-24 DIAGNOSIS — Z98.890 S/P LAMINECTOMY: ICD-10-CM

## 2019-10-24 DIAGNOSIS — D32.1 SPINAL MENINGIOMA (H): ICD-10-CM

## 2019-10-24 DIAGNOSIS — K59.00 CONSTIPATION, UNSPECIFIED CONSTIPATION TYPE: Primary | ICD-10-CM

## 2019-10-24 PROCEDURE — 99309 SBSQ NF CARE MODERATE MDM 30: CPT | Performed by: NURSE PRACTITIONER

## 2019-10-24 NOTE — LETTER
10/24/2019        RE: Elaina Valenzuela  [address]  [place] [address]        [facility]  [facility] Record Number:  [id number]  Place of Service where encounter took place:  [facility] ([facility]) [[id number]]  Chief Complaint   Patient presents with     Nursing Home Acute     HPI:    Elaina Valenzuela  is a 58 year old (1960), who is being seen today for an episodic care visit.  HPI information obtained from: facility chart records, facility staff, patient report and [facility] chart review. Today's concern is:    Constipation. Per staff report, patient required a suppository yesterday. Requesting to increase stool softener. Per patient report, denies abdominal pain. Upon review of documentation, had a bowel movement today. Previous bowel movement was 10/19/19.     History of Spinal Meningioma s/p L4-L5 Fusion and T8-T9 Laminectomy in 2010 with Progressive Weakness/T9-10 Bilateral Laminectomy for Decompression of Central Stenosis on 9/18/19 by Dr. Munguia. Denies pain. Upon review of documentation, utilized Methocarbamol 1 time in the past 2 weeks on 10/20/19 and has not utilized Oxycodone since 10/13/19.     Acute Kidney Injury. Creatinine 1.39 on 10/21/19. Previous Creatinine 0.81 on 10/3/19. Sodium 134 and BUN 21    Past Medical and Surgical History reviewed in Epic today.    MEDICATIONS:  Current Outpatient Medications   Medication Sig Dispense Refill     acetaminophen (TYLENOL) 325 MG tablet Take 650 mg by mouth every 4 hours as needed for mild pain       albuterol (PROAIR HFA/PROVENTIL HFA/VENTOLIN HFA) 108 (90 Base) MCG/ACT inhaler Inhale 2 puffs into the lungs every 6 hours as needed for shortness of breath / dyspnea or wheezing 1 Inhaler 10     amitriptyline (ELAVIL) 100 MG tablet TAKE 1 TABLET BY MOUTH EVERY DAY AT BEDTIME 90 tablet 3     atorvastatin (LIPITOR) 20 MG tablet Take 1 tablet (20 mg) by mouth daily 90 tablet 1     BENEFIBER DRINK MIX  OR POWD Take 2 Tablespoonful by mouth daily        citalopram (CELEXA) 20 MG tablet Take 1 tablet (20 mg) by mouth daily 90 tablet 1     fluticasone-salmeterol (ADVAIR) 100-50 MCG/DOSE inhaler Inhale 1 puff into the lungs 2 times daily 1 Inhaler 3     gabapentin (NEURONTIN) 300 MG capsule Take 2 capsules (600 mg) by mouth 3 times daily 360 capsule 1     methocarbamol (ROBAXIN) 750 MG tablet Take 1 tablet (750 mg) by mouth every 6 hours as needed for muscle spasms 50 tablet 1     metoprolol tartrate (LOPRESSOR) 100 MG tablet TAKE 1 TABLET(100 MG) BY MOUTH TWICE DAILY 180 tablet 2     morphine (MS CONTIN) 15 MG CR tablet Take 1 tablet (15 mg) by mouth daily 30 tablet 0     oxyCODONE (ROXICODONE) 5 MG tablet Take 1-2 tablets (5-10 mg) by mouth every 3 hours as needed for moderate to severe pain 50 tablet 0     polyethylene glycol (MIRALAX/GLYCOLAX) packet Take 17 g by mouth daily as needed for constipation       senna-docusate (SENOKOT-S/PERICOLACE) 8.6-50 MG tablet Take 1 tablet by mouth 2 times daily       tamsulosin (FLOMAX) 0.4 MG capsule Take 1 capsule (0.4 mg) by mouth daily 30 capsule 0     REVIEW OF SYSTEMS:  4 point ROS including Respiratory, CV, GI and , other than that noted in the HPI,  is negative    Objective:  /87   Pulse 81   Temp 97.9  F (36.6  C)   Resp 18   Wt 80.9 kg (178 lb 6.4 oz)   SpO2 96%   BMI 28.79 kg/m     Exam:  GENERAL APPEARANCE:  Alert, in no distress  ENT:  Mouth and posterior oropharynx normal, moist mucous membranes  EYES:  EOM, conjunctivae, lids, pupils and irises normal  RESP:  respiratory effort and palpation of chest normal, lungs clear to auscultation, no respiratory distress  CV:  Palpation and auscultation of heart done , regular rate and rhythm, no murmur, rub, or gallop  ABDOMEN:  normal bowel sounds, soft, nontender, no hepatosplenomegaly or other masses  M/S:   Trace edema noted in BLE.   SKIN:  Healing midline back incision  NEURO:   Cranial nerves 2-12 are  normal tested and grossly at patient's baseline  PSYCH:  oriented to person and place     Labs:   Labs done in SNF are in [place] EPIC. Please refer to them using EPIC/Care Everywhere.    ASSESSMENT/PLAN:  Constipation. Will increase Senna-S to 2 tabs PO BID. Continue Benefiber as ordered.    History of Spinal Meningioma s/p L4-L5 Fusion and T8-T9 Laminectomy in 2010 with Progressive Weakness/T9-10 Bilateral Laminectomy for Decompression of Central Stenosis on 9/18/19 by Dr. Munguia. Follow-up with Juliana Cardenas PA-C, on 10/31/19 as scheduled. MS Contin scheduled decreased on 10/22/19. Oxycodone and Methocarbamol available for pain control. Also on Gabapentin and Amitriptyline for numbness. On no DVT prophylaxis and staff at facility [facility] regarding this. Physical and Occupational Therapy ordered for deconditioning.     Acute Kidney Injury. May be secondary to dehydration given hyponatremia and increase in BUN. Urinating large amounts per documentation. Will discontinue Hydrochlorothiazide. Repeat BMP on 10/28/19 to ensure stability.    Electronically signed by:  AMIE Mondragon CNP            Sincerely,        AMIE Mondragon CNP

## 2019-10-24 NOTE — PROGRESS NOTES
[facility]  [place] Medical Record Number:  [id number]  Place of Service where encounter took place:  [facility] ([facility]) [[id number]]  Chief Complaint   Patient presents with     Nursing Home Acute       HPI:    Elaina Valenzuela  is a 58 year old (1960), who is being seen today for an episodic care visit.  HPI information obtained from: {FGS HPI:053170}. Today's concern is:  {FGS DX:885198}    Past Medical and Surgical History reviewed in Epic today.    MEDICATIONS:  Current Outpatient Medications   Medication Sig Dispense Refill     acetaminophen (TYLENOL) 325 MG tablet Take 650 mg by mouth every 4 hours as needed for mild pain       albuterol (PROAIR HFA/PROVENTIL HFA/VENTOLIN HFA) 108 (90 Base) MCG/ACT inhaler Inhale 2 puffs into the lungs every 6 hours as needed for shortness of breath / dyspnea or wheezing 1 Inhaler 10     amitriptyline (ELAVIL) 100 MG tablet TAKE 1 TABLET BY MOUTH EVERY DAY AT BEDTIME 90 tablet 3     atorvastatin (LIPITOR) 20 MG tablet Take 1 tablet (20 mg) by mouth daily 90 tablet 1     BENEFIBER DRINK MIX OR POWD Take 2 Tablespoonful by mouth daily        citalopram (CELEXA) 20 MG tablet Take 1 tablet (20 mg) by mouth daily 90 tablet 1     fluticasone-salmeterol (ADVAIR) 100-50 MCG/DOSE inhaler Inhale 1 puff into the lungs 2 times daily 1 Inhaler 3     gabapentin (NEURONTIN) 300 MG capsule Take 2 capsules (600 mg) by mouth 3 times daily 360 capsule 1     hydrochlorothiazide (HYDRODIURIL) 25 MG tablet TAKE 1 TABLET BY MOUTH EVERY MORNING. NO REFILLS UNTIL FOLLOW UP 90 tablet 1     methocarbamol (ROBAXIN) 750 MG tablet Take 1 tablet (750 mg) by mouth every 6 hours as needed for muscle spasms 50 tablet 1     metoprolol tartrate (LOPRESSOR) 100 MG tablet TAKE 1 TABLET(100 MG) BY MOUTH TWICE DAILY 180 tablet 2     morphine (MS CONTIN) 15 MG CR tablet Take 1 tablet (15 mg) by mouth daily 30 tablet 0     oxyCODONE (ROXICODONE) 5 MG tablet Take 1-2 tablets  (5-10 mg) by mouth every 3 hours as needed for moderate to severe pain 50 tablet 0     polyethylene glycol (MIRALAX/GLYCOLAX) packet Take 17 g by mouth daily as needed for constipation       senna-docusate (SENOKOT-S/PERICOLACE) 8.6-50 MG tablet Take 1 tablet by mouth 2 times daily       tamsulosin (FLOMAX) 0.4 MG capsule Take 1 capsule (0.4 mg) by mouth daily 30 capsule 0     ***    REVIEW OF SYSTEMS:  {ROS FGS:516674}    Objective:  /87   Pulse 81   Temp 97.9  F (36.6  C)   Resp 18   Wt 80.9 kg (178 lb 6.4 oz)   SpO2 96%   BMI 28.79 kg/m    Exam:  {Nursing home physical exam :632647}    Labs:   {fgslab:355483}    ASSESSMENT/PLAN:  {FGS DX:250591}    {fgsorders:390705}  ***    {fgstime1:503935}  Electronically signed by:  Ellen Mckeon ***  {Providers Please double check the med list (in the plan section >> meds & orders tab) and Discontinue any of the meds flagged by the TC to be discontinued}

## 2019-10-25 RX ORDER — AMOXICILLIN 250 MG
2 CAPSULE ORAL 2 TIMES DAILY
Start: 2019-10-25 | End: 2019-12-03

## 2019-10-25 NOTE — PROGRESS NOTES
[facility]  [place] Medical Record Number:  [id number]  Place of Service where encounter took place:  [facility] ([facility]) [[id number]]  Chief Complaint   Patient presents with     Nursing Home Acute     HPI:    Elaina Valenzuela  is a 58 year old (1960), who is being seen today for an episodic care visit.  HPI information obtained from: facility chart records, facility staff, patient report and [facility] chart review. Today's concern is:    Constipation. Per staff report, patient required a suppository yesterday. Requesting to increase stool softener. Per patient report, denies abdominal pain. Upon review of documentation, had a bowel movement today. Previous bowel movement was 10/19/19.     History of Spinal Meningioma s/p L4-L5 Fusion and T8-T9 Laminectomy in 2010 with Progressive Weakness/T9-10 Bilateral Laminectomy for Decompression of Central Stenosis on 9/18/19 by Dr. Munguia. Denies pain. Upon review of documentation, utilized Methocarbamol 1 time in the past 2 weeks on 10/20/19 and has not utilized Oxycodone since 10/13/19.     Acute Kidney Injury. Creatinine 1.39 on 10/21/19. Previous Creatinine 0.81 on 10/3/19. Sodium 134 and BUN 21    Past Medical and Surgical History reviewed in Epic today.    MEDICATIONS:  Current Outpatient Medications   Medication Sig Dispense Refill     acetaminophen (TYLENOL) 325 MG tablet Take 650 mg by mouth every 4 hours as needed for mild pain       albuterol (PROAIR HFA/PROVENTIL HFA/VENTOLIN HFA) 108 (90 Base) MCG/ACT inhaler Inhale 2 puffs into the lungs every 6 hours as needed for shortness of breath / dyspnea or wheezing 1 Inhaler 10     amitriptyline (ELAVIL) 100 MG tablet TAKE 1 TABLET BY MOUTH EVERY DAY AT BEDTIME 90 tablet 3     atorvastatin (LIPITOR) 20 MG tablet Take 1 tablet (20 mg) by mouth daily 90 tablet 1     BENEFIBER DRINK MIX OR POWD Take 2 Tablespoonful by mouth daily        citalopram (CELEXA) 20 MG tablet Take 1  tablet (20 mg) by mouth daily 90 tablet 1     fluticasone-salmeterol (ADVAIR) 100-50 MCG/DOSE inhaler Inhale 1 puff into the lungs 2 times daily 1 Inhaler 3     gabapentin (NEURONTIN) 300 MG capsule Take 2 capsules (600 mg) by mouth 3 times daily 360 capsule 1     methocarbamol (ROBAXIN) 750 MG tablet Take 1 tablet (750 mg) by mouth every 6 hours as needed for muscle spasms 50 tablet 1     metoprolol tartrate (LOPRESSOR) 100 MG tablet TAKE 1 TABLET(100 MG) BY MOUTH TWICE DAILY 180 tablet 2     morphine (MS CONTIN) 15 MG CR tablet Take 1 tablet (15 mg) by mouth daily 30 tablet 0     oxyCODONE (ROXICODONE) 5 MG tablet Take 1-2 tablets (5-10 mg) by mouth every 3 hours as needed for moderate to severe pain 50 tablet 0     polyethylene glycol (MIRALAX/GLYCOLAX) packet Take 17 g by mouth daily as needed for constipation       senna-docusate (SENOKOT-S/PERICOLACE) 8.6-50 MG tablet Take 1 tablet by mouth 2 times daily       tamsulosin (FLOMAX) 0.4 MG capsule Take 1 capsule (0.4 mg) by mouth daily 30 capsule 0     REVIEW OF SYSTEMS:  4 point ROS including Respiratory, CV, GI and , other than that noted in the HPI,  is negative    Objective:  /87   Pulse 81   Temp 97.9  F (36.6  C)   Resp 18   Wt 80.9 kg (178 lb 6.4 oz)   SpO2 96%   BMI 28.79 kg/m    Exam:  GENERAL APPEARANCE:  Alert, in no distress  ENT:  Mouth and posterior oropharynx normal, moist mucous membranes  EYES:  EOM, conjunctivae, lids, pupils and irises normal  RESP:  respiratory effort and palpation of chest normal, lungs clear to auscultation, no respiratory distress  CV:  Palpation and auscultation of heart done , regular rate and rhythm, no murmur, rub, or gallop  ABDOMEN:  normal bowel sounds, soft, nontender, no hepatosplenomegaly or other masses  M/S:   Trace edema noted in BLE.   SKIN:  Healing midline back incision  NEURO:   Cranial nerves 2-12 are normal tested and grossly at patient's baseline  PSYCH:  oriented to person and  place     Labs:   Labs done in SNF are in [facility]. Please refer to them using EPIC/Care Everywhere.    ASSESSMENT/PLAN:  Constipation. Will increase Senna-S to 2 tabs PO BID. Continue Benefiber as ordered.    History of Spinal Meningioma s/p L4-L5 Fusion and T8-T9 Laminectomy in 2010 with Progressive Weakness/T9-10 Bilateral Laminectomy for Decompression of Central Stenosis on 9/18/19 by Dr. Munguia. Follow-up with Juliana Cardenas PA-C, on 10/31/19 as scheduled. MS Contin scheduled decreased on 10/22/19. Oxycodone and Methocarbamol available for pain control. Also on Gabapentin and Amitriptyline for numbness. On no DVT prophylaxis and staff at facility updated [facility] regarding this. Physical and Occupational Therapy ordered for deconditioning.     Acute Kidney Injury. May be secondary to dehydration given hyponatremia and increase in BUN. Urinating large amounts per documentation. Will discontinue Hydrochlorothiazide. Repeat BMP on 10/28/19 to ensure stability.    Electronically signed by:  AMIE Mondragon CNP

## 2019-10-28 ENCOUNTER — RECORDS - HEALTHEAST (OUTPATIENT)
Dept: LAB | Facility: CLINIC | Age: 59
End: 2019-10-28

## 2019-10-28 ENCOUNTER — NURSING HOME VISIT (OUTPATIENT)
Dept: GERIATRICS | Facility: CLINIC | Age: 59
End: 2019-10-28
Payer: COMMERCIAL

## 2019-10-28 ENCOUNTER — TRANSFERRED RECORDS (OUTPATIENT)
Dept: HEALTH INFORMATION MANAGEMENT | Facility: CLINIC | Age: 59
End: 2019-10-28

## 2019-10-28 VITALS
RESPIRATION RATE: 18 BRPM | BODY MASS INDEX: 28.79 KG/M2 | OXYGEN SATURATION: 91 % | HEART RATE: 94 BPM | TEMPERATURE: 98.1 F | WEIGHT: 178.4 LBS | DIASTOLIC BLOOD PRESSURE: 97 MMHG | SYSTOLIC BLOOD PRESSURE: 145 MMHG

## 2019-10-28 DIAGNOSIS — D32.1 SPINAL MENINGIOMA (H): Primary | ICD-10-CM

## 2019-10-28 DIAGNOSIS — N17.9 ACUTE KIDNEY INJURY (H): ICD-10-CM

## 2019-10-28 DIAGNOSIS — Z98.890 S/P LAMINECTOMY: ICD-10-CM

## 2019-10-28 DIAGNOSIS — I10 BENIGN ESSENTIAL HYPERTENSION: ICD-10-CM

## 2019-10-28 LAB
ANION GAP SERPL CALCULATED.3IONS-SCNC: 10 MMOL/L (ref 5–18)
ANION GAP SERPL CALCULATED.3IONS-SCNC: 10 MMOL/L (ref 5–18)
BUN SERPL-MCNC: 10 MG/DL (ref 8–22)
BUN SERPL-MCNC: 10 MG/DL (ref 8–22)
CALCIUM SERPL-MCNC: 8.5 MG/DL (ref 8.5–10.5)
CALCIUM SERPL-MCNC: 8.5 MG/DL (ref 8.5–10.5)
CHLORIDE BLD-SCNC: 99 MMOL/L (ref 98–107)
CHLORIDE SERPLBLD-SCNC: 99 MMOL/L (ref 98–107)
CO2 SERPL-SCNC: 30 MMOL/L (ref 22–31)
CO2 SERPL-SCNC: 30 MMOL/L (ref 22–31)
CREAT SERPL-MCNC: 1.2 MG/DL (ref 0.7–1.3)
CREAT SERPL-MCNC: 1.2 MG/DL (ref 0.7–1.3)
GFR SERPL CREATININE-BSD FRML MDRD: >60 ML/MIN/1.73M2
GFR SERPL CREATININE-BSD FRML MDRD: >60 ML/MIN/1.73M2
GLUCOSE BLD-MCNC: 87 MG/DL (ref 70–125)
GLUCOSE SERPL-MCNC: 87 MG/DL (ref 70–125)
POTASSIUM BLD-SCNC: 4.2 MMOL/L (ref 3.5–5)
POTASSIUM SERPL-SCNC: 4.2 MMOL/L (ref 3.5–5)
SODIUM SERPL-SCNC: 139 MMOL/L (ref 136–145)
SODIUM SERPL-SCNC: 139 MMOL/L (ref 136–145)

## 2019-10-28 PROCEDURE — 99309 SBSQ NF CARE MODERATE MDM 30: CPT | Performed by: NURSE PRACTITIONER

## 2019-10-28 NOTE — PROGRESS NOTES
[facility]  [place] Medical Record Number:  [id number]  Place of Service where encounter took place:  [facility] ([facility]) [[id number]]  Chief Complaint   Patient presents with     Nursing Home Acute     HPI:    Elaina Valenzuela  is a 58 year old (1960), who is being seen today for an episodic care visit.  HPI information obtained from: facility chart records, facility staff, patient report and [facility] chart review. Today's concern is:    History of Spinal Meningioma s/p L4-L5 Fusion and T8-T9 Laminectomy in 2010 with Progressive Weakness/T9-10 Bilateral Laminectomy for Decompression of Central Stenosis on 9/18/19 by Dr. Munguia. Has no pain at rest. Upon review of documentation, has utilized Methocarbamol 1 time in the past 2 weeks or Oxycodone since 10/13/19. Working on core strength. Legs flaccid with no spontaneous movement noted. Slide board transfers unsafe due to poor trunk control. Transfers with EZ stand. Maximum assistance with bed mobility. SBA for upper body with rails and head of bed elevated.    Acute Kidney Injury. Today, Creatinine 1.2 on 10/28/19 -> Creatinine 1.39 on 10/21/19. Hydrochlorothiazide discontinued 10/24/19.     Hypertension. Hydrochlorothiazide discontinued as noted above. Upon review of documentation over the past 5 days, systolic range 114-150, most 120-140s. Diastolic range from .    Past Medical and Surgical History reviewed in Epic today.    MEDICATIONS:  Current Outpatient Medications   Medication Sig Dispense Refill     acetaminophen (TYLENOL) 325 MG tablet Take 650 mg by mouth every 4 hours as needed for mild pain       albuterol (PROAIR HFA/PROVENTIL HFA/VENTOLIN HFA) 108 (90 Base) MCG/ACT inhaler Inhale 2 puffs into the lungs every 6 hours as needed for shortness of breath / dyspnea or wheezing 1 Inhaler 10     amitriptyline (ELAVIL) 100 MG tablet TAKE 1 TABLET BY MOUTH EVERY DAY AT BEDTIME 90 tablet 3     atorvastatin  (LIPITOR) 20 MG tablet Take 1 tablet (20 mg) by mouth daily 90 tablet 1     BENEFIBER DRINK MIX OR POWD Take 2 Tablespoonful by mouth daily        citalopram (CELEXA) 20 MG tablet Take 1 tablet (20 mg) by mouth daily 90 tablet 1     fluticasone-salmeterol (ADVAIR) 100-50 MCG/DOSE inhaler Inhale 1 puff into the lungs 2 times daily 1 Inhaler 3     gabapentin (NEURONTIN) 300 MG capsule Take 2 capsules (600 mg) by mouth 3 times daily 360 capsule 1     methocarbamol (ROBAXIN) 750 MG tablet Take 1 tablet (750 mg) by mouth every 6 hours as needed for muscle spasms 50 tablet 1     metoprolol tartrate (LOPRESSOR) 100 MG tablet TAKE 1 TABLET(100 MG) BY MOUTH TWICE DAILY 180 tablet 2     morphine (MS CONTIN) 15 MG CR tablet Take 1 tablet (15 mg) by mouth daily 30 tablet 0     oxyCODONE (ROXICODONE) 5 MG tablet Take 1-2 tablets (5-10 mg) by mouth every 3 hours as needed for moderate to severe pain 50 tablet 0     polyethylene glycol (MIRALAX/GLYCOLAX) packet Take 17 g by mouth daily as needed for constipation       senna-docusate (SENOKOT-S/PERICOLACE) 8.6-50 MG tablet Take 2 tablets by mouth 2 times daily       tamsulosin (FLOMAX) 0.4 MG capsule Take 1 capsule (0.4 mg) by mouth daily 30 capsule 0     REVIEW OF SYSTEMS:  4 point ROS including Respiratory, CV, GI and , other than that noted in the HPI,  is negative    Objective:  BP (!) 145/97   Pulse 94   Temp 98.1  F (36.7  C)   Resp 18   Wt 80.9 kg (178 lb 6.4 oz)   SpO2 91%   BMI 28.79 kg/m    Exam:  GENERAL APPEARANCE:  Alert, in no distress  ENT:  Mouth and posterior oropharynx normal, moist mucous membranes  EYES:  EOM, conjunctivae, lids, pupils and irises normal  RESP:  respiratory effort and palpation of chest normal, lungs clear to auscultation, no respiratory distress  CV:  Palpation and auscultation of heart done , regular rate and rhythm, no murmur, rub, or gallop  ABDOMEN:  normal bowel sounds, soft, nontender, no hepatosplenomegaly or other  masses  M/S:   Trace edema noted in BLE.   SKIN:  Healing midline back incision  NEURO:   Cranial nerves 2-12 are normal tested and grossly at patient's baseline  PSYCH:  oriented to person and place     Labs:   Labs done in SNF are in [place] EPIC. Please refer to them using EPIC/Care Everywhere.    ASSESSMENT/PLAN:  History of Spinal Meningioma s/p L4-L5 Fusion and T8-T9 Laminectomy in 2010 with Progressive Weakness/T9-10 Bilateral Laminectomy for Decompression of Central Stenosis on 9/18/19 by Dr. Munguia. Follow-up with Juliana Cardenas PA-C, on 10/31/19 as scheduled. MS Contin scheduled decreased on 10/22/19. Oxycodone and Methocarbamol available for pain control. Also on Gabapentin and Amitriptyline for numbness. If continuing not to utilize PRN pain medications later this week, will discontinue scheduled MS Contin. On no DVT prophylaxis and staff at facility updated [facility] regarding this. Physical and Occupational Therapy ordered for deconditioning.     Acute Kidney Injury. Improved since discontinuation of Hydrochlorothiazide on 10/24/19. Will repeat BMP in 1 week to ensure stability.     Hypertension. Most blood pressures < 140/90. Monitor blood pressure closely with recent discontinuation of Hydrochlorothiazide as noted above.     Electronically signed by:  AMIE Mondragon CNP

## 2019-10-28 NOTE — LETTER
10/28/2019        RE: Elaina Valenzuela  [address]  [place] [address]        [facility]  [place] Medical Record Number:  [id number]  Place of Service where encounter took place:  [facility] ([facility]) [[id number]]  Chief Complaint   Patient presents with     Nursing Home Acute     HPI:    Elaina Valenzuela  is a 58 year old (1960), who is being seen today for an episodic care visit.  HPI information obtained from: facility chart records, facility staff, patient report and [facility] chart review. Today's concern is:    History of Spinal Meningioma s/p L4-L5 Fusion and T8-T9 Laminectomy in 2010 with Progressive Weakness/T9-10 Bilateral Laminectomy for Decompression of Central Stenosis on 9/18/19 by Dr. Munguia. Has no pain at rest. Upon review of documentation, has utilized Methocarbamol 1 time in the past 2 weeks or Oxycodone since 10/13/19. Working on core strength. Legs flaccid with no spontaneous movement noted. Slide board transfers unsafe due to poor trunk control. Transfers with EZ stand. Maximum assistance with bed mobility. SBA for upper body with rails and head of bed elevated.    Acute Kidney Injury. Today, Creatinine 1.2 on 10/28/19 -> Creatinine 1.39 on 10/21/19. Hydrochlorothiazide discontinued 10/24/19.     Hypertension. Hydrochlorothiazide discontinued as noted above. Upon review of documentation over the past 5 days, systolic range 114-150, most 120-140s. Diastolic range from .    Past Medical and Surgical History reviewed in Epic today.    MEDICATIONS:  Current Outpatient Medications   Medication Sig Dispense Refill     acetaminophen (TYLENOL) 325 MG tablet Take 650 mg by mouth every 4 hours as needed for mild pain       albuterol (PROAIR HFA/PROVENTIL HFA/VENTOLIN HFA) 108 (90 Base) MCG/ACT inhaler Inhale 2 puffs into the lungs every 6 hours as needed for shortness of breath / dyspnea or wheezing 1 Inhaler 10     amitriptyline (ELAVIL)  100 MG tablet TAKE 1 TABLET BY MOUTH EVERY DAY AT BEDTIME 90 tablet 3     atorvastatin (LIPITOR) 20 MG tablet Take 1 tablet (20 mg) by mouth daily 90 tablet 1     BENEFIBER DRINK MIX OR POWD Take 2 Tablespoonful by mouth daily        citalopram (CELEXA) 20 MG tablet Take 1 tablet (20 mg) by mouth daily 90 tablet 1     fluticasone-salmeterol (ADVAIR) 100-50 MCG/DOSE inhaler Inhale 1 puff into the lungs 2 times daily 1 Inhaler 3     gabapentin (NEURONTIN) 300 MG capsule Take 2 capsules (600 mg) by mouth 3 times daily 360 capsule 1     methocarbamol (ROBAXIN) 750 MG tablet Take 1 tablet (750 mg) by mouth every 6 hours as needed for muscle spasms 50 tablet 1     metoprolol tartrate (LOPRESSOR) 100 MG tablet TAKE 1 TABLET(100 MG) BY MOUTH TWICE DAILY 180 tablet 2     morphine (MS CONTIN) 15 MG CR tablet Take 1 tablet (15 mg) by mouth daily 30 tablet 0     oxyCODONE (ROXICODONE) 5 MG tablet Take 1-2 tablets (5-10 mg) by mouth every 3 hours as needed for moderate to severe pain 50 tablet 0     polyethylene glycol (MIRALAX/GLYCOLAX) packet Take 17 g by mouth daily as needed for constipation       senna-docusate (SENOKOT-S/PERICOLACE) 8.6-50 MG tablet Take 2 tablets by mouth 2 times daily       tamsulosin (FLOMAX) 0.4 MG capsule Take 1 capsule (0.4 mg) by mouth daily 30 capsule 0     REVIEW OF SYSTEMS:  4 point ROS including Respiratory, CV, GI and , other than that noted in the HPI,  is negative    Objective:  BP (!) 145/97   Pulse 94   Temp 98.1  F (36.7  C)   Resp 18   Wt 80.9 kg (178 lb 6.4 oz)   SpO2 91%   BMI 28.79 kg/m     Exam:  GENERAL APPEARANCE:  Alert, in no distress  ENT:  Mouth and posterior oropharynx normal, moist mucous membranes  EYES:  EOM, conjunctivae, lids, pupils and irises normal  RESP:  respiratory effort and palpation of chest normal, lungs clear to auscultation, no respiratory distress  CV:  Palpation and auscultation of heart done , regular rate and rhythm, no murmur, rub, or  gallop  ABDOMEN:  normal bowel sounds, soft, nontender, no hepatosplenomegaly or other masses  M/S:   Trace edema noted in BLE.   SKIN:  Healing midline back incision  NEURO:   Cranial nerves 2-12 are normal tested and grossly at patient's baseline  PSYCH:  oriented to person and place     Labs:   Labs done in SNF are in [place] EPIC. Please refer to them using EPIC/Care Everywhere.    ASSESSMENT/PLAN:  History of Spinal Meningioma s/p L4-L5 Fusion and T8-T9 Laminectomy in 2010 with Progressive Weakness/T9-10 Bilateral Laminectomy for Decompression of Central Stenosis on 9/18/19 by Dr. Munguia. Follow-up with Juliana Cardenas PA-C, on 10/31/19 as scheduled. MS Contin scheduled decreased on 10/22/19. Oxycodone and Methocarbamol available for pain control. Also on Gabapentin and Amitriptyline for numbness. If continuing not to utilize PRN pain medications later this week, will discontinue scheduled MS Contin. On no DVT prophylaxis and staff at facility updated [facility] regarding this. Physical and Occupational Therapy ordered for deconditioning.     Acute Kidney Injury. Improved since discontinuation of Hydrochlorothiazide on 10/24/19. Will repeat BMP in 1 week to ensure stability.     Hypertension. Most blood pressures < 140/90. Monitor blood pressure closely with recent discontinuation of Hydrochlorothiazide as noted above.     Electronically signed by:  AMIE Mondragon CNP             Sincerely,        AMIE Mondragon CNP

## 2019-10-31 ENCOUNTER — NURSING HOME VISIT (OUTPATIENT)
Dept: GERIATRICS | Facility: CLINIC | Age: 59
End: 2019-10-31
Payer: COMMERCIAL

## 2019-10-31 ENCOUNTER — OFFICE VISIT (OUTPATIENT)
Dept: NEUROSURGERY | Facility: CLINIC | Age: 59
End: 2019-10-31
Payer: COMMERCIAL

## 2019-10-31 VITALS
OXYGEN SATURATION: 94 % | DIASTOLIC BLOOD PRESSURE: 85 MMHG | WEIGHT: 177 LBS | HEART RATE: 82 BPM | HEIGHT: 65 IN | SYSTOLIC BLOOD PRESSURE: 137 MMHG | TEMPERATURE: 97.6 F | BODY MASS INDEX: 29.49 KG/M2

## 2019-10-31 VITALS
WEIGHT: 178.4 LBS | BODY MASS INDEX: 28.79 KG/M2 | DIASTOLIC BLOOD PRESSURE: 89 MMHG | OXYGEN SATURATION: 92 % | HEART RATE: 83 BPM | RESPIRATION RATE: 20 BRPM | SYSTOLIC BLOOD PRESSURE: 135 MMHG | TEMPERATURE: 98 F

## 2019-10-31 DIAGNOSIS — D32.1 SPINAL MENINGIOMA (H): ICD-10-CM

## 2019-10-31 DIAGNOSIS — R60.0 LOCALIZED EDEMA: Primary | ICD-10-CM

## 2019-10-31 DIAGNOSIS — Z98.890 S/P LAMINECTOMY: Primary | ICD-10-CM

## 2019-10-31 DIAGNOSIS — I10 BENIGN ESSENTIAL HYPERTENSION: ICD-10-CM

## 2019-10-31 DIAGNOSIS — Z98.890 S/P LAMINECTOMY: ICD-10-CM

## 2019-10-31 PROCEDURE — 99024 POSTOP FOLLOW-UP VISIT: CPT | Performed by: PHYSICIAN ASSISTANT

## 2019-10-31 PROCEDURE — 99309 SBSQ NF CARE MODERATE MDM 30: CPT | Performed by: NURSE PRACTITIONER

## 2019-10-31 ASSESSMENT — PAIN SCALES - GENERAL: PAINLEVEL: NO PAIN (0)

## 2019-10-31 ASSESSMENT — MIFFLIN-ST. JEOR: SCORE: 1559.75

## 2019-10-31 NOTE — PROGRESS NOTES
[facility]  [place] Medical Record Number:  [id number]  Place of Service where encounter took place:  [facility] (S) [[id number]]  Chief Complaint   Patient presents with     Nursing Home Acute     HPI:    Elaina Valenzuela  is a 58 year old (1960), who is being seen today for an episodic care visit.  HPI information obtained from: facility chart records, facility staff, patient report and [facility] chart review. Today's concern is:    Bilateral Lower Extremity Edema. Approached by Therapy Director following today's visit. Concerned left leg is swollen 2 times the size of the right leg. Slightly warm to touch. Questions if a water pill should be initiated. Is concerned to do lymphedema treatments given recent bump in Creatinine. Per patient report, denies pain in left leg.     History of Spinal Meningioma s/p L4-L5 Fusion and T8-T9 Laminectomy in 2010 with Progressive Weakness/T9-10 Bilateral Laminectomy for Decompression of Central Stenosis on 9/18/19 by Dr. Munguia. Has no pain at rest. Talks about wanting to cut down on medications. Has not utilized Oxycodone PRN since 10/13/19.     Hypertension. Hydrochlorothiazide discontinued 10/24/19 due to PAL. Upon review of blood pressure over the past 5 days, systolic range from 114-157, most 120-140. Diastolic range from .    Past Medical and Surgical History reviewed in Epic today.    MEDICATIONS:  Current Outpatient Medications   Medication Sig Dispense Refill     acetaminophen (TYLENOL) 325 MG tablet Take 650 mg by mouth every 4 hours as needed for mild pain       albuterol (PROAIR HFA/PROVENTIL HFA/VENTOLIN HFA) 108 (90 Base) MCG/ACT inhaler Inhale 2 puffs into the lungs every 6 hours as needed for shortness of breath / dyspnea or wheezing 1 Inhaler 10     amitriptyline (ELAVIL) 100 MG tablet TAKE 1 TABLET BY MOUTH EVERY DAY AT BEDTIME (Patient not taking: Reported on 10/31/2019) 90 tablet 3     atorvastatin  (LIPITOR) 20 MG tablet Take 1 tablet (20 mg) by mouth daily 90 tablet 1     BENEFIBER DRINK MIX OR POWD Take 2 Tablespoonful by mouth daily        citalopram (CELEXA) 20 MG tablet Take 1 tablet (20 mg) by mouth daily 90 tablet 1     fluticasone-salmeterol (ADVAIR) 100-50 MCG/DOSE inhaler Inhale 1 puff into the lungs 2 times daily 1 Inhaler 3     gabapentin (NEURONTIN) 300 MG capsule Take 2 capsules (600 mg) by mouth 3 times daily 360 capsule 1     methocarbamol (ROBAXIN) 750 MG tablet Take 1 tablet (750 mg) by mouth every 6 hours as needed for muscle spasms 50 tablet 1     metoprolol tartrate (LOPRESSOR) 100 MG tablet TAKE 1 TABLET(100 MG) BY MOUTH TWICE DAILY 180 tablet 2     morphine (MS CONTIN) 15 MG CR tablet Take 1 tablet (15 mg) by mouth daily 30 tablet 0     oxyCODONE (ROXICODONE) 5 MG tablet Take 1-2 tablets (5-10 mg) by mouth every 3 hours as needed for moderate to severe pain 50 tablet 0     polyethylene glycol (MIRALAX/GLYCOLAX) packet Take 17 g by mouth daily as needed for constipation       senna-docusate (SENOKOT-S/PERICOLACE) 8.6-50 MG tablet Take 2 tablets by mouth 2 times daily       tamsulosin (FLOMAX) 0.4 MG capsule Take 1 capsule (0.4 mg) by mouth daily 30 capsule 0     REVIEW OF SYSTEMS:  4 point ROS including Respiratory, CV, GI and , other than that noted in the HPI,  is negative    Objective:  /89   Pulse 83   Temp 98  F (36.7  C)   Resp 20   Wt 80.9 kg (178 lb 6.4 oz)   SpO2 92%   BMI 28.79 kg/m    Exam:  GENERAL APPEARANCE:  Alert, in no distress  ENT:  Mouth and posterior oropharynx normal, moist mucous membranes  EYES:  EOM, conjunctivae, lids, pupils and irises normal  RESP:  respiratory effort and palpation of chest normal, lungs clear to auscultation, no respiratory distress  CV:  Palpation and auscultation of heart done , regular rate and rhythm, no murmur, rub, or gallop  ABDOMEN:  normal bowel sounds, soft, nontender, no hepatosplenomegaly or other masses  M/S:!+ edema  noted in BLE, L>R.   SKIN:  Healing midline back incision  NEURO:   Cranial nerves 2-12 are normal tested and grossly at patient's baseline  PSYCH:  oriented to person and place    Labs:   Labs done in SNF are in [place] EPIC. Please refer to them using EPIC/Care Everywhere.    ASSESSMENT/PLAN:  Bilateral Lower Extremity Edema. L>R. No pain, warmth or redness. Hydrochlorothiazide discontinued 10/24/19 due to PAL so question if this was helping to reduce edema. Spends most of the day in the wheelchair with legs in dependent position. Continue compression stockings as ordered. Encourage elevation.      History of Spinal Meningioma s/p L4-L5 Fusion and T8-T9 Laminectomy in 2010 with Progressive Weakness/T9-10 Bilateral Laminectomy for Decompression of Central Stenosis on 9/18/19 by Dr. Munguia. Follow-up with Charan Godfrey as scheduled. MS Contin scheduled decreased on 10/22/19. Due to no reports of pain, will discontinue scheduled MS Contin. Oxycodone and Methocarbamol available for pain control. Also on Gabapentin and Amitriptyline for numbness. On no DVT prophylaxis and staff at facility [facility] regarding this. Physical and Occupational Therapy ordered for deconditioning.     Hypertension. Most blood pressures < 140/90. Monitor blood pressure closely with recent discontinuation of Hydrochlorothiazide as noted above.     Electronically signed by:  AMIE Mondragon CNP

## 2019-10-31 NOTE — NURSING NOTE
"Elaina Valenzuela is a 58 year old male who presents for:  Chief Complaint   Patient presents with     Surgical Followup     6 week follow up 9/18/2019 T9-10 laminectomies.         Initial Vitals:  /85 (BP Location: Right arm, Patient Position: Sitting, Cuff Size: Adult Large)   Pulse 82   Temp 97.6  F (36.4  C) (Oral)   Ht 5' 5\" (1.651 m)   Wt 177 lb (80.3 kg)   SpO2 94%   BMI 29.45 kg/m   Estimated body mass index is 29.45 kg/m  as calculated from the following:    Height as of this encounter: 5' 5\" (1.651 m).    Weight as of this encounter: 177 lb (80.3 kg).. Body surface area is 1.92 meters squared. BP completed using cuff size: large  No Pain (0)        Nursing Comments: Patient states that since surgery he is unable to walk.         Ana Askew, HALLIE    "

## 2019-10-31 NOTE — LETTER
10/31/2019        RE: Elaina Valenzuela  [address]  [place] [address]        [facility]  [place] Medical Record Number:  [id number]  Place of Service where encounter took place:  [facility] ([facility]) [[id number]]  Chief Complaint   Patient presents with     Nursing Home Acute     HPI:    Elaina Valenzuela  is a 58 year old (1960), who is being seen today for an episodic care visit.  HPI information obtained from: facility chart records, facility staff, patient report and [facility] chart review. Today's concern is:    Bilateral Lower Extremity Edema. Approached by Therapy Director following today's visit. Concerned left leg is swollen 2 times the size of the right leg. Slightly warm to touch. Questions if a water pill should be initiated. Is concerned to do lymphedema treatments given recent bump in Creatinine. Per patient report, denies pain in left leg.     History of Spinal Meningioma s/p L4-L5 Fusion and T8-T9 Laminectomy in 2010 with Progressive Weakness/T9-10 Bilateral Laminectomy for Decompression of Central Stenosis on 9/18/19 by Dr. Munguia. Has no pain at rest. Talks about wanting to cut down on medications. Has not utilized Oxycodone PRN since 10/13/19.     Hypertension. Hydrochlorothiazide discontinued 10/24/19 due to PAL. Upon review of blood pressure over the past 5 days, systolic range from 114-157, most 120-140. Diastolic range from .    Past Medical and Surgical History reviewed in Epic today.    MEDICATIONS:  Current Outpatient Medications   Medication Sig Dispense Refill     acetaminophen (TYLENOL) 325 MG tablet Take 650 mg by mouth every 4 hours as needed for mild pain       albuterol (PROAIR HFA/PROVENTIL HFA/VENTOLIN HFA) 108 (90 Base) MCG/ACT inhaler Inhale 2 puffs into the lungs every 6 hours as needed for shortness of breath / dyspnea or wheezing 1 Inhaler 10     amitriptyline (ELAVIL) 100 MG tablet TAKE 1 TABLET BY MOUTH EVERY  DAY AT BEDTIME (Patient not taking: Reported on 10/31/2019) 90 tablet 3     atorvastatin (LIPITOR) 20 MG tablet Take 1 tablet (20 mg) by mouth daily 90 tablet 1     BENEFIBER DRINK MIX OR POWD Take 2 Tablespoonful by mouth daily        citalopram (CELEXA) 20 MG tablet Take 1 tablet (20 mg) by mouth daily 90 tablet 1     fluticasone-salmeterol (ADVAIR) 100-50 MCG/DOSE inhaler Inhale 1 puff into the lungs 2 times daily 1 Inhaler 3     gabapentin (NEURONTIN) 300 MG capsule Take 2 capsules (600 mg) by mouth 3 times daily 360 capsule 1     methocarbamol (ROBAXIN) 750 MG tablet Take 1 tablet (750 mg) by mouth every 6 hours as needed for muscle spasms 50 tablet 1     metoprolol tartrate (LOPRESSOR) 100 MG tablet TAKE 1 TABLET(100 MG) BY MOUTH TWICE DAILY 180 tablet 2     morphine (MS CONTIN) 15 MG CR tablet Take 1 tablet (15 mg) by mouth daily 30 tablet 0     oxyCODONE (ROXICODONE) 5 MG tablet Take 1-2 tablets (5-10 mg) by mouth every 3 hours as needed for moderate to severe pain 50 tablet 0     polyethylene glycol (MIRALAX/GLYCOLAX) packet Take 17 g by mouth daily as needed for constipation       senna-docusate (SENOKOT-S/PERICOLACE) 8.6-50 MG tablet Take 2 tablets by mouth 2 times daily       tamsulosin (FLOMAX) 0.4 MG capsule Take 1 capsule (0.4 mg) by mouth daily 30 capsule 0     REVIEW OF SYSTEMS:  4 point ROS including Respiratory, CV, GI and , other than that noted in the HPI,  is negative    Objective:  /89   Pulse 83   Temp 98  F (36.7  C)   Resp 20   Wt 80.9 kg (178 lb 6.4 oz)   SpO2 92%   BMI 28.79 kg/m     Exam:  GENERAL APPEARANCE:  Alert, in no distress  ENT:  Mouth and posterior oropharynx normal, moist mucous membranes  EYES:  EOM, conjunctivae, lids, pupils and irises normal  RESP:  respiratory effort and palpation of chest normal, lungs clear to auscultation, no respiratory distress  CV:  Palpation and auscultation of heart done , regular rate and rhythm, no murmur, rub, or  gallop  ABDOMEN:  normal bowel sounds, soft, nontender, no hepatosplenomegaly or other masses  M/S:!+ edema noted in BLE, L>R.   SKIN:  Healing midline back incision  NEURO:   Cranial nerves 2-12 are normal tested and grossly at patient's baseline  PSYCH:  oriented to person and place    Labs:   Labs done in SNF are in [facility]. Please refer to them using EPIC/Care Everywhere.    ASSESSMENT/PLAN:  Bilateral Lower Extremity Edema. L>R. No pain, warmth or redness. Hydrochlorothiazide discontinued 10/24/19 due to PAL so question if this was helping to reduce edema. Spends most of the day in the wheelchair with legs in dependent position. Continue compression stockings as ordered. Encourage elevation.      History of Spinal Meningioma s/p L4-L5 Fusion and T8-T9 Laminectomy in 2010 with Progressive Weakness/T9-10 Bilateral Laminectomy for Decompression of Central Stenosis on 9/18/19 by Dr. Munguia. Follow-up with Charan Godfrey as scheduled. MS Contin scheduled decreased on 10/22/19. Due to no reports of pain, will discontinue scheduled MS Contin. Oxycodone and Methocarbamol available for pain control. Also on Gabapentin and Amitriptyline for numbness. On no DVT prophylaxis and staff at facility updated [facility] regarding this. Physical and Occupational Therapy ordered for deconditioning.     Hypertension. Most blood pressures < 140/90. Monitor blood pressure closely with recent discontinuation of Hydrochlorothiazide as noted above.     Electronically signed by:  AMIE Mondragon CNP             Sincerely,        AMIE Mondragon CNP

## 2019-10-31 NOTE — LETTER
10/31/2019         RE: Elaina Valenzuela  [address]  [place] [address]        Dear Colleague,    Thank you for referring your patient, Elaina Valenzuela, to the [facility]. Please see a copy of my visit note below.    [facility]  Neurosurgery followup:    HPI: 6 weeks s/p T9-10 thoracic laminectomy. Presents with . States he has been doing well and has minimal back pain. Currently at [facility]. Has been working with therapies. Continued paraplegia in BLE. He is able to wiggle toes.   Exam:  Constitutional:  Alert, well nourished, NAD.  HEENT: Normocephalic, atraumatic.   Pulm:  Without shortness of breath   CV:  No pitting edema of BLE.      Neurological:  Awake  Alert  Oriented x 3  Motor exam:   Flaccidity to BLE, Able to wiggles toes bilaterally    Sensation decreased to BLE     Incision: Healing nicely  A/P: 6 weeks sp T9-10 thoracic laminectomy. Presents with . States he has been doing well and has minimal back pain. Incision healing nicely. Continue with therapies. Follow up in 6 weeks. Patient voiced understanding and agreement.  - Continue with therapy  - followup in 6 weeks   - Call the clinic at [phone] for increased pain or any other questions and concerns.      Juliana Cardenas PA-C  [facility]  [address]  [address] [place] [address], [place] [address]    Tel [phone]  Pager [phone]      Again, thank you for allowing me to participate in the care of your patient.        Sincerely,        Juliana Cardenas PA-C

## 2019-10-31 NOTE — PATIENT INSTRUCTIONS
- Continue with therapy  - followup in 6 weeks   - Call the clinic at [phone] for increased pain or any other questions and concerns.

## 2019-10-31 NOTE — PROGRESS NOTES
[facility]  Neurosurgery followup:    HPI: 6 weeks s/p T9-10 thoracic laminectomy. Presents with . States he has been doing well and has minimal back pain. Currently at [facility]. Has been working with therapies. Continued paraplegia in BLE. He is able to wiggle toes.   Exam:  Constitutional:  Alert, well nourished, NAD.  HEENT: Normocephalic, atraumatic.   Pulm:  Without shortness of breath   CV:  No pitting edema of BLE.      Neurological:  Awake  Alert  Oriented x 3  Motor exam:   Flaccidity to BLE, Able to wiggles toes bilaterally    Sensation decreased to BLE     Incision: Healing nicely  A/P: 6 weeks sp T9-10 thoracic laminectomy. Presents with . States he has been doing well and has minimal back pain. Incision healing nicely. Continue with therapies. Follow up in 6 weeks. Patient voiced understanding and agreement.  - Continue with therapy  - followup in 6 weeks   - Call the clinic at [phone] for increased pain or any other questions and concerns.      Juliana Cardenas PA-C  [facility]  [address]  [address] [place] [id number]    Tel [phone]  Pager [phone]

## 2019-11-04 ENCOUNTER — TRANSFERRED RECORDS (OUTPATIENT)
Dept: HEALTH INFORMATION MANAGEMENT | Facility: CLINIC | Age: 59
End: 2019-11-04

## 2019-11-04 ENCOUNTER — RECORDS - HEALTHEAST (OUTPATIENT)
Dept: LAB | Facility: CLINIC | Age: 59
End: 2019-11-04

## 2019-11-04 ENCOUNTER — NURSING HOME VISIT (OUTPATIENT)
Dept: GERIATRICS | Facility: CLINIC | Age: 59
End: 2019-11-04
Payer: COMMERCIAL

## 2019-11-04 VITALS
WEIGHT: 176.3 LBS | HEIGHT: 65 IN | HEART RATE: 71 BPM | OXYGEN SATURATION: 93 % | RESPIRATION RATE: 18 BRPM | SYSTOLIC BLOOD PRESSURE: 139 MMHG | BODY MASS INDEX: 29.37 KG/M2 | DIASTOLIC BLOOD PRESSURE: 91 MMHG | TEMPERATURE: 98 F

## 2019-11-04 DIAGNOSIS — D32.1 SPINAL MENINGIOMA (H): Primary | ICD-10-CM

## 2019-11-04 DIAGNOSIS — N17.9 ACUTE KIDNEY INJURY (H): ICD-10-CM

## 2019-11-04 DIAGNOSIS — I10 BENIGN ESSENTIAL HYPERTENSION: ICD-10-CM

## 2019-11-04 DIAGNOSIS — Z98.1 S/P LUMBAR FUSION: ICD-10-CM

## 2019-11-04 LAB
ANION GAP SERPL CALCULATED.3IONS-SCNC: 10 MMOL/L (ref 5–18)
ANION GAP SERPL CALCULATED.3IONS-SCNC: 10 MMOL/L (ref 5–18)
BUN SERPL-MCNC: 3 MG/DL (ref 8–22)
BUN SERPL-MCNC: 3 MG/DL (ref 8–22)
CALCIUM SERPL-MCNC: 8.4 MG/DL (ref 8.5–10.5)
CALCIUM SERPL-MCNC: 8.4 MG/DL (ref 8.5–10.5)
CHLORIDE BLD-SCNC: 106 MMOL/L (ref 98–107)
CHLORIDE SERPLBLD-SCNC: 106 MMOL/L (ref 98–107)
CO2 SERPL-SCNC: 27 MMOL/L (ref 22–31)
CO2 SERPL-SCNC: 27 MMOL/L (ref 22–31)
CREAT SERPL-MCNC: 1.07 MG/DL (ref 0.7–1.3)
CREAT SERPL-MCNC: 1.07 MG/DL (ref 0.7–1.3)
GFR SERPL CREATININE-BSD FRML MDRD: >60 ML/MIN/1.73M2
GFR SERPL CREATININE-BSD FRML MDRD: >60 ML/MIN/1.73M2
GLUCOSE BLD-MCNC: 77 MG/DL (ref 70–125)
GLUCOSE SERPL-MCNC: 77 MG/DL (ref 70–125)
POTASSIUM BLD-SCNC: 4 MMOL/L (ref 3.5–5)
POTASSIUM SERPL-SCNC: 4 MMOL/L (ref 3.5–5)
SODIUM SERPL-SCNC: 143 MMOL/L (ref 136–145)
SODIUM SERPL-SCNC: 143 MMOL/L (ref 136–145)

## 2019-11-04 PROCEDURE — 99309 SBSQ NF CARE MODERATE MDM 30: CPT | Performed by: NURSE PRACTITIONER

## 2019-11-04 RX ORDER — ACETAMINOPHEN 325 MG/1
650 TABLET ORAL EVERY 4 HOURS PRN
COMMUNITY
End: 2019-12-04 | Stop reason: DRUGHIGH

## 2019-11-04 ASSESSMENT — MIFFLIN-ST. JEOR: SCORE: 1556.57

## 2019-11-04 NOTE — PROGRESS NOTES
"[facility]  [place] Medical Record Number:  [id number]  Place of Service where encounter took place:  [facility] (S) [[id number]]  Chief Complaint   Patient presents with     RECHECK     HPI:    Elaina Valenzuela  is a 58 year old (1960), who is being seen today for an episodic care visit.  HPI information obtained from: facility chart records, facility staff, patient report and [facility] chart review. Today's concern is:    History of Spinal Meningioma s/p L4-L5 Fusion and T8-T9 Laminectomy in 2010 with Progressive Weakness/T9-10 Bilateral Laminectomy for Decompression of Central Stenosis on 9/18/19 by Dr. Munguia. Per patient report, \"I have no pain\". Seen by Neurosurgery on 10/31/19. Recommend follow-up in 6 weeks with continued therapies. Scheduled MS Contin discontinued on 10/31/19. Has utilized Oxycodone PRN 1 time in the past 2 weeks.    Hypertension. Upon review of blood pressure over the past 5 days, systolic range from 130-158, most 130s. Diastolic range from 72-95.    Acute Kidney Injury. Creatinine 1.07 on 11/4/19. Previous Creatinine 1.20 on 10/28/19.     Past Medical and Surgical History reviewed in Epic today.    MEDICATIONS:  Current Outpatient Medications   Medication Sig Dispense Refill     albuterol (PROAIR HFA/PROVENTIL HFA/VENTOLIN HFA) 108 (90 Base) MCG/ACT inhaler Inhale 2 puffs into the lungs every 6 hours as needed for shortness of breath / dyspnea or wheezing 1 Inhaler 10     amitriptyline (ELAVIL) 100 MG tablet TAKE 1 TABLET BY MOUTH EVERY DAY AT BEDTIME 90 tablet 3     atorvastatin (LIPITOR) 20 MG tablet Take 1 tablet (20 mg) by mouth daily 90 tablet 1     BENEFIBER DRINK MIX OR POWD Take 2 Tablespoonful by mouth daily        citalopram (CELEXA) 20 MG tablet Take 1 tablet (20 mg) by mouth daily 90 tablet 1     fluticasone-salmeterol (ADVAIR) 100-50 MCG/DOSE inhaler Inhale 1 puff into the lungs 2 times daily 1 Inhaler 3     gabapentin " "(NEURONTIN) 300 MG capsule Take 2 capsules (600 mg) by mouth 3 times daily 360 capsule 1     methocarbamol (ROBAXIN) 750 MG tablet Take 1 tablet (750 mg) by mouth every 6 hours as needed for muscle spasms 50 tablet 1     metoprolol tartrate (LOPRESSOR) 100 MG tablet TAKE 1 TABLET(100 MG) BY MOUTH TWICE DAILY 180 tablet 2     oxyCODONE (ROXICODONE) 5 MG tablet Take 1-2 tablets (5-10 mg) by mouth every 3 hours as needed for moderate to severe pain 50 tablet 0     polyethylene glycol (MIRALAX/GLYCOLAX) packet Take 17 g by mouth daily as needed for constipation       senna-docusate (SENOKOT-S/PERICOLACE) 8.6-50 MG tablet Take 2 tablets by mouth 2 times daily       tamsulosin (FLOMAX) 0.4 MG capsule Take 1 capsule (0.4 mg) by mouth daily 30 capsule 0     acetaminophen (TYLENOL) 325 MG tablet Take 650 mg by mouth every 4 hours as needed for mild pain       REVIEW OF SYSTEMS:  4 point ROS including Respiratory, CV, GI and , other than that noted in the HPI,  is negative    Objective:  BP (!) 139/91   Pulse 71   Temp 98  F (36.7  C)   Resp 18   Ht 1.651 m (5' 5\")   Wt 80 kg (176 lb 4.8 oz)   SpO2 93%   BMI 29.34 kg/m    Exam:  GENERAL APPEARANCE:  Alert, in no distress  ENT:  Mouth and posterior oropharynx normal, moist mucous membranes  EYES:  EOM, conjunctivae, lids, pupils and irises normal  RESP:  respiratory effort and palpation of chest normal, lungs clear to auscultation, no respiratory distress  CV:  Palpation and auscultation of heart done , regular rate and rhythm, no murmur, rub, or gallop  ABDOMEN:  normal bowel sounds, soft, nontender, no hepatosplenomegaly or other masses  M/S:!+ edema noted in BLE, L>R.   SKIN: Did not visualize incision  NEURO:   Cranial nerves 2-12 are normal tested and grossly at patient's baseline  PSYCH:  oriented to person and place    Labs:   Labs done in SNF are in [place] EPIC. Please refer to them using EPIC/Care Everywhere.    ASSESSMENT/PLAN:  History of Spinal " Meningioma s/p L4-L5 Fusion and T8-T9 Laminectomy in 2010 with Progressive Weakness/T9-10 Bilateral Laminectomy for Decompression of Central Stenosis on 9/18/19 by Dr. Munguia. Follow-up with Juliana Cardenas PA-C on 12/19/19 as scheduled. MS Contin discontinued 10/31/19. Oxycodone and Methocarbamol available for pain control. Also on Gabapentin and Amitriptyline for numbness. On no DVT prophylaxis and staff at facility updated [facility] regarding this. Physical and Occupational Therapy ordered for deconditioning.     Hypertension. Most blood pressures < 140/90. Monitor blood pressure closely with recent discontinuation of Hydrochlorothiazide due to hyponatremia and PAL.    Acute Kidney Injury. Improved since discontinuation of Hydrochlorothiazide on 10/24/19. Monitor BMP periodically to ensure stability.     Electronically signed by:  AMIE Mondragon CNP

## 2019-11-04 NOTE — LETTER
"    11/4/2019        RE: Elaina Valenzuela  [address]  [place] [address]        [facility]  [facility] Record Number:  [id number]  Place of Service where encounter took place:  [facility] ([facility]) [[id number]]  Chief Complaint   Patient presents with     RECHECK     HPI:    Elaina Valenzuela  is a 58 year old (1960), who is being seen today for an episodic care visit.  HPI information obtained from: facility chart records, facility staff, patient report and [facility] chart review. Today's concern is:    History of Spinal Meningioma s/p L4-L5 Fusion and T8-T9 Laminectomy in 2010 with Progressive Weakness/T9-10 Bilateral Laminectomy for Decompression of Central Stenosis on 9/18/19 by Dr. Munguia. Per patient report, \"I have no pain\". Seen by Neurosurgery on 10/31/19. Recommend follow-up in 6 weeks with continued therapies. Scheduled MS Contin discontinued on 10/31/19. Has utilized Oxycodone PRN 1 time in the past 2 weeks.    Hypertension. Upon review of blood pressure over the past 5 days, systolic range from 130-158, most 130s. Diastolic range from 72-95.    Acute Kidney Injury. Creatinine 1.07 on 11/4/19. Previous Creatinine 1.20 on 10/28/19.     Past Medical and Surgical History reviewed in Epic today.    MEDICATIONS:  Current Outpatient Medications   Medication Sig Dispense Refill     albuterol (PROAIR HFA/PROVENTIL HFA/VENTOLIN HFA) 108 (90 Base) MCG/ACT inhaler Inhale 2 puffs into the lungs every 6 hours as needed for shortness of breath / dyspnea or wheezing 1 Inhaler 10     amitriptyline (ELAVIL) 100 MG tablet TAKE 1 TABLET BY MOUTH EVERY DAY AT BEDTIME 90 tablet 3     atorvastatin (LIPITOR) 20 MG tablet Take 1 tablet (20 mg) by mouth daily 90 tablet 1     BENEFIBER DRINK MIX OR POWD Take 2 Tablespoonful by mouth daily        citalopram (CELEXA) 20 MG tablet Take 1 tablet (20 mg) by mouth daily 90 tablet 1     fluticasone-salmeterol (ADVAIR) 100-50 " "MCG/DOSE inhaler Inhale 1 puff into the lungs 2 times daily 1 Inhaler 3     gabapentin (NEURONTIN) 300 MG capsule Take 2 capsules (600 mg) by mouth 3 times daily 360 capsule 1     methocarbamol (ROBAXIN) 750 MG tablet Take 1 tablet (750 mg) by mouth every 6 hours as needed for muscle spasms 50 tablet 1     metoprolol tartrate (LOPRESSOR) 100 MG tablet TAKE 1 TABLET(100 MG) BY MOUTH TWICE DAILY 180 tablet 2     oxyCODONE (ROXICODONE) 5 MG tablet Take 1-2 tablets (5-10 mg) by mouth every 3 hours as needed for moderate to severe pain 50 tablet 0     polyethylene glycol (MIRALAX/GLYCOLAX) packet Take 17 g by mouth daily as needed for constipation       senna-docusate (SENOKOT-S/PERICOLACE) 8.6-50 MG tablet Take 2 tablets by mouth 2 times daily       tamsulosin (FLOMAX) 0.4 MG capsule Take 1 capsule (0.4 mg) by mouth daily 30 capsule 0     acetaminophen (TYLENOL) 325 MG tablet Take 650 mg by mouth every 4 hours as needed for mild pain       REVIEW OF SYSTEMS:  4 point ROS including Respiratory, CV, GI and , other than that noted in the HPI,  is negative    Objective:  BP (!) 139/91   Pulse 71   Temp 98  F (36.7  C)   Resp 18   Ht 1.651 m (5' 5\")   Wt 80 kg (176 lb 4.8 oz)   SpO2 93%   BMI 29.34 kg/m     Exam:  GENERAL APPEARANCE:  Alert, in no distress  ENT:  Mouth and posterior oropharynx normal, moist mucous membranes  EYES:  EOM, conjunctivae, lids, pupils and irises normal  RESP:  respiratory effort and palpation of chest normal, lungs clear to auscultation, no respiratory distress  CV:  Palpation and auscultation of heart done , regular rate and rhythm, no murmur, rub, or gallop  ABDOMEN:  normal bowel sounds, soft, nontender, no hepatosplenomegaly or other masses  M/S:!+ edema noted in BLE, L>R.   SKIN: Did not visualize incision  NEURO:   Cranial nerves 2-12 are normal tested and grossly at patient's baseline  PSYCH:  oriented to person and place    Labs:   Labs done in SNF are in [facility] EPIC. Please " refer to them using EPIC/Care Everywhere.    ASSESSMENT/PLAN:  History of Spinal Meningioma s/p L4-L5 Fusion and T8-T9 Laminectomy in 2010 with Progressive Weakness/T9-10 Bilateral Laminectomy for Decompression of Central Stenosis on 9/18/19 by Dr. Munguia. Follow-up with Juliana Cardenas PA-C on 12/19/19 as scheduled. MS Contin discontinued 10/31/19. Oxycodone and Methocarbamol available for pain control. Also on Gabapentin and Amitriptyline for numbness. On no DVT prophylaxis and staff at facility updated [facility] regarding this. Physical and Occupational Therapy ordered for deconditioning.     Hypertension. Most blood pressures < 140/90. Monitor blood pressure closely with recent discontinuation of Hydrochlorothiazide due to hyponatremia and PAL.    Acute Kidney Injury. Improved since discontinuation of Hydrochlorothiazide on 10/24/19. Monitor BMP periodically to ensure stability.     Electronically signed by:  AMIE Mondragon CNP           Sincerely,        AMIE Mondragon CNP

## 2019-11-07 ENCOUNTER — DISCHARGE SUMMARY NURSING HOME (OUTPATIENT)
Dept: GERIATRICS | Facility: CLINIC | Age: 59
End: 2019-11-07
Payer: COMMERCIAL

## 2019-11-07 VITALS
BODY MASS INDEX: 29.37 KG/M2 | DIASTOLIC BLOOD PRESSURE: 94 MMHG | HEART RATE: 78 BPM | OXYGEN SATURATION: 92 % | RESPIRATION RATE: 20 BRPM | SYSTOLIC BLOOD PRESSURE: 151 MMHG | WEIGHT: 176.3 LBS | TEMPERATURE: 98.1 F | HEIGHT: 65 IN

## 2019-11-07 DIAGNOSIS — E78.5 HYPERLIPIDEMIA, UNSPECIFIED HYPERLIPIDEMIA TYPE: ICD-10-CM

## 2019-11-07 DIAGNOSIS — I10 BENIGN ESSENTIAL HYPERTENSION: ICD-10-CM

## 2019-11-07 DIAGNOSIS — N17.9 ACUTE KIDNEY INJURY (H): ICD-10-CM

## 2019-11-07 DIAGNOSIS — R60.0 LOCALIZED EDEMA: ICD-10-CM

## 2019-11-07 DIAGNOSIS — E87.6 HYPOKALEMIA: ICD-10-CM

## 2019-11-07 DIAGNOSIS — Z98.1 S/P SPINAL FUSION: ICD-10-CM

## 2019-11-07 DIAGNOSIS — N31.9 NEUROGENIC BLADDER: ICD-10-CM

## 2019-11-07 DIAGNOSIS — J45.20 MILD INTERMITTENT ASTHMA WITHOUT COMPLICATION: ICD-10-CM

## 2019-11-07 DIAGNOSIS — K59.00 CONSTIPATION, UNSPECIFIED CONSTIPATION TYPE: ICD-10-CM

## 2019-11-07 DIAGNOSIS — F32.A DEPRESSION, UNSPECIFIED DEPRESSION TYPE: ICD-10-CM

## 2019-11-07 DIAGNOSIS — E87.1 HYPONATREMIA: ICD-10-CM

## 2019-11-07 DIAGNOSIS — D62 ANEMIA DUE TO BLOOD LOSS, ACUTE: ICD-10-CM

## 2019-11-07 DIAGNOSIS — D32.1 SPINAL MENINGIOMA (H): Primary | ICD-10-CM

## 2019-11-07 PROCEDURE — 99316 NF DSCHRG MGMT 30 MIN+: CPT | Performed by: NURSE PRACTITIONER

## 2019-11-07 ASSESSMENT — MIFFLIN-ST. JEOR: SCORE: 1556.57

## 2019-11-07 NOTE — LETTER
"    11/7/2019        RE: Elaina Valenzuela  [address]  [place] [address]        [facility] DISCHARGE SUMMARY  PATIENT'S NAME: Elaina Valenzuela  YOB: 1960  MEDICAL RECORD NUMBER:  [id number]  Place of Service where encounter took place:  [facility] ([facility] [[id number]]    PRIMARY CARE PROVIDER AND CLINIC RESPONSIBLE AFTER TRANSFER:   [facility], [address] [facility] [address]    [facility] Provider     Transferring providers: AMIE Mondragon CNP, Ellis San MD  Recent Hospitalization/ED:  Hospital  [facility] stay 9/18/19 to 9/25/19.  Date of SNF Admission: September / 25 / 2019  Date of SNF (anticipated) Discharge: November / 11 / 2019  Discharged to: [facility]  Cognitive Scores: BIMS: 10/15/15  Physical Function: As of 10/28/19: Working on core strength. Legs continue to be flaccid. No spontaneous movement. Sliding board transfer unsafe due to poor trunk control. Transfers with EZ stand. Maximum assistance for bed mobility with legs. SBA for upper body with rails and head of bed elevated.  DME: Wheelchair    CODE STATUS/ADVANCE DIRECTIVES DISCUSSION:  Full Code   ALLERGIES: Penicillins    DISCHARGE DIAGNOSIS/NURSING FACILITY COURSE:   This is a 58-year-old male, with a past medical history significant for hypertension, hyperlipidemia, asthma, neurogenic bladder and history of spinal meningioma s/p L4-L5 Fusion and T8-T9 Laminectomy in 2010 with progressive weakness, who was admitted to [facility] for T9-10 Bilateral Laminectomy for decompression of central stenosis on 9/18/19 by Dr. Munguia. Developed temperature of 101.1 F on 9/20/19 with WBC 15.7 and Procalcitonin 0.08.. A chest x-ray revealed \"left basilar pneumonia versus atelectasis\". Antibiotics were initiated. Concern also for UTI with alvarado catheter in place, but urine culture " "revealed no growth. Hydrochlorothiazide was held during hospitalization. Hemoglobin 11.6 on 9/19/19 -> 10.5 on 9/25/19. A TCU stay was recommended for ongoing physical rehabilitation.     While in TCU, on-call NP updated on 9/27/19 regarding a tympanic temperature 100.2 and 101.2 with SpO2 88-93% with patient complaint of feeling cold. Ordered STAT CBC, Incentive Spirometer, DuoNebs and Acetaminophen PRN. Labs revealed WBC 15.6, down from 16.9 on 9/25/19. Incision clean, dry and intact. Urine daniella in color. On 9/28/19, lower temperature, but heart rate 107 with SpO2 88-90%. Ordered chest x-ray and urine culture. Later, on-call MD updated with temperature 102 F, feeling hot and cold. Recommended transfer to ED for further evaluation, but facility requested patient remain at facility. Chest x-ray revealed \"low lung volumes\" with no acute findings. Urine culture with no growth. Last day of Levofloxacin 9/27/19 for pneumonia. Fever resolved. Elevated temperature and leukocytosis was thought to be due to inflammation.    Patient was unable to safely transfer with sliding board transfers due to poor trunk control. Required an EZ stand. Reached a plateau in Physical and Occupational Therapy. Family unable to afford EZ stand so the decision was made to transfer to [facility].     History of Spinal Meningioma s/p L4-L5 Fusion and T8-T9 Laminectomy in 2010 with Progressive Weakness/T9-10 Bilateral Laminectomy for Decompression of Central Stenosis on 9/18/19 by Dr. Munguia. Follow-up with Juliana Cardenas PA-C on 12/19/19 as scheduled. MS Contin discontinued 10/31/19. Oxycodone and Methocarbamol available for pain control. Also on Gabapentin and Amitriptyline for numbness. On no DVT prophylaxis and staff at facility updated [facility] regarding this.     Pneumonia with History of Asthma. Seen in [facility] ED on 9/13/19 for breathing problems and back pain. A chest x-ray revealed \"Mild " "hypoventilated lungs\". Treated with a Medrol dose pack as per instructions and Doxycycline 9/13/19 through 9/20/19 due to concern for COPD exacerbation. Was also given a spacer for Albuterol inhaler. During hospitalization, developed fever with chest x-ray concerned for pneumonia as noted above. Completed Levofloxacin on 9/27/19.  Also taking Albuterol and Fluticasone-Salmeterol as ordered.     Anemia due to Blood Loss, Acute. Secondary to above. Hemoglobin 11.6 on 9/19/19 -> 10.5 on 9/25/19. Last Hemoglobin 11.5 on 10/21/19. Monitor periodically to ensure stability.      Hypokalemia. Had episodes of mild hypokalemia which were supplemented with Potassium Chloride. May be secondary to Hydrochlorothiazide which was discontinued 10/24/19 due to PAL. Last Potassium 4.6 on 10/21/19.    Acute Kidney Injury. Creatinine elevated at 1.39 on 10/21/19. Hydrochlorothiazide discontinued 10/24/19. Last Creatinine 1.07 on 11/4/19, which has been trending down since discontinuation of Hydrochlorothiazide. Monitor closely to ensure continued downward trend.    Hyponatremia. Mild. Sodium as low as 133 on 9/30/19. May be secondary to Hydrochlorothiazide as this resolved when medication was discontinued. Intake at meals appears to be adequate. Is on Citalopram and has been on since 2015 so unlikely etiology. Monitor periodically to ensure stability.      Neurogenic Bladder. Alvarado catheter placed during hospitalization and not removed at discharge. Failed voiding trial on 9/30/19, but had successful voiding trial on 10/9/19 and alvarado catheter was removed. Also onTamsulosin. PCP recommended patient see Urology in the past.      Hypertension/Hyperlipidemia. Upon review of blood pressure over the past 5 days, systolic range from 130-158, most 130-140s. Diastolic range from . Monitor closely as blood pressures have been trending up slightly. Hydrochlorothiazide discontinued 10/24/19 due to PAL. Continue Metoprolol and Atorvastatin as " ordered.    Bilateral Lower Extremity Edema. Trace-to-1+. Has compression stockings. Encourage elevation.     Depression. Continue Citalopram as ordered.     Constipation. Continue Miralax and Senna-S as ordered. Also on Benefiber.     Past Medical History:  has a past medical history of Chronic back pain, HTN (hypertension), Left leg weakness, Mild intermittent asthma, and Neurogenic bladder.    Discharge Medications:  Current Outpatient Medications   Medication Sig Dispense Refill     acetaminophen (TYLENOL) 325 MG tablet Take 650 mg by mouth every 4 hours as needed for mild pain       albuterol (PROAIR HFA/PROVENTIL HFA/VENTOLIN HFA) 108 (90 Base) MCG/ACT inhaler Inhale 2 puffs into the lungs every 6 hours as needed for shortness of breath / dyspnea or wheezing 1 Inhaler 10     amitriptyline (ELAVIL) 100 MG tablet TAKE 1 TABLET BY MOUTH EVERY DAY AT BEDTIME 90 tablet 3     atorvastatin (LIPITOR) 20 MG tablet Take 1 tablet (20 mg) by mouth daily 90 tablet 1     citalopram (CELEXA) 20 MG tablet Take 1 tablet (20 mg) by mouth daily 90 tablet 1     fluticasone-salmeterol (ADVAIR) 100-50 MCG/DOSE inhaler Inhale 1 puff into the lungs 2 times daily 1 Inhaler 3     gabapentin (NEURONTIN) 300 MG capsule Take 2 capsules (600 mg) by mouth 3 times daily 360 capsule 1     methocarbamol (ROBAXIN) 750 MG tablet Take 1 tablet (750 mg) by mouth every 6 hours as needed for muscle spasms 50 tablet 1     metoprolol tartrate (LOPRESSOR) 100 MG tablet TAKE 1 TABLET(100 MG) BY MOUTH TWICE DAILY 180 tablet 2     oxyCODONE (ROXICODONE) 5 MG tablet Take 1-2 tablets (5-10 mg) by mouth every 3 hours as needed for moderate to severe pain 50 tablet 0     polyethylene glycol (MIRALAX/GLYCOLAX) packet Take 17 g by mouth daily as needed for constipation       senna-docusate (SENOKOT-S/PERICOLACE) 8.6-50 MG tablet Take 2 tablets by mouth 2 times daily       tamsulosin (FLOMAX) 0.4 MG capsule Take 1 capsule (0.4 mg) by mouth daily 30 capsule 0      "BENEFIBER DRINK MIX OR POWD Take 2 Tablespoonful by mouth daily        Medication Changes/Rationale:     See above    Controlled medications sent with patient:   Will need to determine LTC pharmacy at [place] to e-prescribe Oxycodone     ROS:   4 point ROS including Respiratory, CV, GI and , other than that noted in the HPI,  is negative    Physical Exam:   Vitals: BP (!) 151/94   Pulse 78   Temp 98.1  F (36.7  C)   Resp 20   Ht 1.651 m (5' 5\")   Wt 80 kg (176 lb 4.8 oz)   SpO2 92%   BMI 29.34 kg/m     BMI= Body mass index is 29.34 kg/m .  GENERAL APPEARANCE:  Alert, in no distress  ENT:  Mouth and posterior oropharynx normal, moist mucous membranes  EYES:  EOM, conjunctivae, lids, pupils and irises normal  RESP:  respiratory effort and palpation of chest normal, lungs clear to auscultation, no respiratory distress  CV:  Palpation and auscultation of heart done , regular rate and rhythm, no murmur, rub, or gallop  ABDOMEN:  normal bowel sounds, soft, nontender, no hepatosplenomegaly or other masses  M/S Trace edema noted in BLE, L>R.   SKIN: Did not visualize incision  NEURO:   Cranial nerves 2-12 are normal tested and grossly at patient's baseline  PSYCH:  oriented to person and place    SNF labs: Labs done in SNF are in [facility] EPIC. Please refer to them using EPIC/Care Everywhere.    DISCHARGE PLAN:    Follow up labs: No labs orders/due    Medical Follow Up:      Follow up with primary care provider in 1 week    [facility] referral needed and placed by this provider: No    Current [facility] scheduled appointments:  Next 5 appointments (look out 90 days)    Dec 19, 2019  2:00 PM CST  Return Visit with Juliana Cardenas PA-C  [facility] ([facility]) [address] [place] [id number] [phone]           Discharge Services: Will transfer to [facility]    TOTAL DISCHARGE TIME:   Greater than 30 minutes  Electronically signed by:  Ashia Austin " AMIE Siddiqui CNP                Sincerely,        AMIE Mondragon CNP

## 2019-11-07 NOTE — PROGRESS NOTES
"[place] [facility] DISCHARGE SUMMARY  PATIENT'S NAME: Elaina Valenzuela  YOB: 1960  MEDICAL RECORD NUMBER:  [id number]  Place of Service where encounter took place:  [facility] ([facility]) [[id number]]    PRIMARY CARE PROVIDER AND CLINIC RESPONSIBLE AFTER TRANSFER:   [facility], [address] / [place] [address]    [facility] Provider     Transferring providers: AMIE Mondragon CNP, Ellis San MD  Recent Hospitalization/ED:  [facility] stay 9/18/19 to 9/25/19.  Date of SNF Admission: September / 25 / 2019  Date of SNF (anticipated) Discharge: November / 11 / 2019  Discharged to: [facility]  Cognitive Scores: BIMS: 10/15/15  Physical Function: As of 10/28/19: Working on core strength. Legs continue to be flaccid. No spontaneous movement. Sliding board transfer unsafe due to poor trunk control. Transfers with EZ stand. Maximum assistance for bed mobility with legs. SBA for upper body with rails and head of bed elevated.  DME: Wheelchair    CODE STATUS/ADVANCE DIRECTIVES DISCUSSION:  Full Code   ALLERGIES: Penicillins    DISCHARGE DIAGNOSIS/NURSING FACILITY COURSE:   This is a 58-year-old male, with a past medical history significant for hypertension, hyperlipidemia, asthma, neurogenic bladder and history of spinal meningioma s/p L4-L5 Fusion and T8-T9 Laminectomy in 2010 with progressive weakness, who was admitted to [facility] for T9-10 Bilateral Laminectomy for decompression of central stenosis on 9/18/19 by Dr. Munguia. Developed temperature of 101.1 F on 9/20/19 with WBC 15.7 and Procalcitonin 0.08.. A chest x-ray revealed \"left basilar pneumonia versus atelectasis\". Antibiotics were initiated. Concern also for UTI with alvarado catheter in place, but urine culture revealed no growth. Hydrochlorothiazide was held during hospitalization. Hemoglobin 11.6 on 9/19/19 " "-> 10.5 on 9/25/19. A TCU stay was recommended for ongoing physical rehabilitation.     While in TCU, on-call NP updated on 9/27/19 regarding a tympanic temperature 100.2 and 101.2 with SpO2 88-93% with patient complaint of feeling cold. Ordered STAT CBC, Incentive Spirometer, DuoNebs and Acetaminophen PRN. Labs revealed WBC 15.6, down from 16.9 on 9/25/19. Incision clean, dry and intact. Urine daniella in color. On 9/28/19, lower temperature, but heart rate 107 with SpO2 88-90%. Ordered chest x-ray and urine culture. Later, on-call MD updated with temperature 102 F, feeling hot and cold. Recommended transfer to ED for further evaluation, but facility requested patient remain at facility. Chest x-ray revealed \"low lung volumes\" with no acute findings. Urine culture with no growth. Last day of Levofloxacin 9/27/19 for pneumonia. Fever resolved. Elevated temperature and leukocytosis was thought to be due to inflammation.    Patient was unable to safely transfer with sliding board transfers due to poor trunk control. Required an EZ stand. Reached a plateau in Physical and Occupational Therapy. Family unable to afford EZ stand so the decision was made to transfer to [facility].     History of Spinal Meningioma s/p L4-L5 Fusion and T8-T9 Laminectomy in 2010 with Progressive Weakness/T9-10 Bilateral Laminectomy for Decompression of Central Stenosis on 9/18/19 by Dr. Munguia. Follow-up with Juliana Cardenas PA-C on 12/19/19 as scheduled. MS Contin discontinued 10/31/19. Oxycodone and Methocarbamol available for pain control. Also on Gabapentin and Amitriptyline for numbness. On no DVT prophylaxis and staff at facility updated [facility] regarding this.     Pneumonia with History of Asthma. Seen in [facility] ED on 9/13/19 for breathing problems and back pain. A chest x-ray revealed \"Mild hypoventilated lungs\". Treated with a Medrol dose pack as per instructions and Doxycycline 9/13/19 through " 9/20/19 due to concern for COPD exacerbation. Was also given a spacer for Albuterol inhaler. During hospitalization, developed fever with chest x-ray concerned for pneumonia as noted above. Completed Levofloxacin on 9/27/19.  Also taking Albuterol and Fluticasone-Salmeterol as ordered.     Anemia due to Blood Loss, Acute. Secondary to above. Hemoglobin 11.6 on 9/19/19 -> 10.5 on 9/25/19. Last Hemoglobin 11.5 on 10/21/19. Monitor periodically to ensure stability.      Hypokalemia. Had episodes of mild hypokalemia which were supplemented with Potassium Chloride. May be secondary to Hydrochlorothiazide which was discontinued 10/24/19 due to PAL. Last Potassium 4.6 on 10/21/19.    Acute Kidney Injury. Creatinine elevated at 1.39 on 10/21/19. Hydrochlorothiazide discontinued 10/24/19. Last Creatinine 1.07 on 11/4/19, which has been trending down since discontinuation of Hydrochlorothiazide. Monitor closely to ensure continued downward trend.    Hyponatremia. Mild. Sodium as low as 133 on 9/30/19. May be secondary to Hydrochlorothiazide as this resolved when medication was discontinued. Intake at meals appears to be adequate. Is on Citalopram and has been on since 2015 so unlikely etiology. Monitor periodically to ensure stability.      Neurogenic Bladder. Alvarado catheter placed during hospitalization and not removed at discharge. Failed voiding trial on 9/30/19, but had successful voiding trial on 10/9/19 and alvarado catheter was removed. Also onTamsulosin. PCP recommended patient see Urology in the past.      Hypertension/Hyperlipidemia. Upon review of blood pressure over the past 5 days, systolic range from 130-158, most 130-140s. Diastolic range from . Monitor closely as blood pressures have been trending up slightly. Hydrochlorothiazide discontinued 10/24/19 due to PAL. Continue Metoprolol and Atorvastatin as ordered.    Bilateral Lower Extremity Edema. Trace-to-1+. Has compression stockings. Encourage  elevation.     Depression. Continue Citalopram as ordered.     Constipation. Continue Miralax and Senna-S as ordered. Also on Benefiber.     Past Medical History:  has a past medical history of Chronic back pain, HTN (hypertension), Left leg weakness, Mild intermittent asthma, and Neurogenic bladder.    Discharge Medications:  Current Outpatient Medications   Medication Sig Dispense Refill     acetaminophen (TYLENOL) 325 MG tablet Take 650 mg by mouth every 4 hours as needed for mild pain       albuterol (PROAIR HFA/PROVENTIL HFA/VENTOLIN HFA) 108 (90 Base) MCG/ACT inhaler Inhale 2 puffs into the lungs every 6 hours as needed for shortness of breath / dyspnea or wheezing 1 Inhaler 10     amitriptyline (ELAVIL) 100 MG tablet TAKE 1 TABLET BY MOUTH EVERY DAY AT BEDTIME 90 tablet 3     atorvastatin (LIPITOR) 20 MG tablet Take 1 tablet (20 mg) by mouth daily 90 tablet 1     citalopram (CELEXA) 20 MG tablet Take 1 tablet (20 mg) by mouth daily 90 tablet 1     fluticasone-salmeterol (ADVAIR) 100-50 MCG/DOSE inhaler Inhale 1 puff into the lungs 2 times daily 1 Inhaler 3     gabapentin (NEURONTIN) 300 MG capsule Take 2 capsules (600 mg) by mouth 3 times daily 360 capsule 1     methocarbamol (ROBAXIN) 750 MG tablet Take 1 tablet (750 mg) by mouth every 6 hours as needed for muscle spasms 50 tablet 1     metoprolol tartrate (LOPRESSOR) 100 MG tablet TAKE 1 TABLET(100 MG) BY MOUTH TWICE DAILY 180 tablet 2     oxyCODONE (ROXICODONE) 5 MG tablet Take 1-2 tablets (5-10 mg) by mouth every 3 hours as needed for moderate to severe pain 50 tablet 0     polyethylene glycol (MIRALAX/GLYCOLAX) packet Take 17 g by mouth daily as needed for constipation       senna-docusate (SENOKOT-S/PERICOLACE) 8.6-50 MG tablet Take 2 tablets by mouth 2 times daily       tamsulosin (FLOMAX) 0.4 MG capsule Take 1 capsule (0.4 mg) by mouth daily 30 capsule 0     BENEFIBER DRINK MIX OR POWD Take 2 Tablespoonful by mouth daily        Medication  "Changes/Rationale:     See above    Controlled medications sent with patient:   Will need to determine LTC pharmacy at [place] to e-prescribe Oxycodone     ROS:   4 point ROS including Respiratory, CV, GI and , other than that noted in the HPI,  is negative    Physical Exam:   Vitals: BP (!) 151/94   Pulse 78   Temp 98.1  F (36.7  C)   Resp 20   Ht 1.651 m (5' 5\")   Wt 80 kg (176 lb 4.8 oz)   SpO2 92%   BMI 29.34 kg/m    BMI= Body mass index is 29.34 kg/m .  GENERAL APPEARANCE:  Alert, in no distress  ENT:  Mouth and posterior oropharynx normal, moist mucous membranes  EYES:  EOM, conjunctivae, lids, pupils and irises normal  RESP:  respiratory effort and palpation of chest normal, lungs clear to auscultation, no respiratory distress  CV:  Palpation and auscultation of heart done , regular rate and rhythm, no murmur, rub, or gallop  ABDOMEN:  normal bowel sounds, soft, nontender, no hepatosplenomegaly or other masses  M/S Trace edema noted in BLE, L>R.   SKIN: Did not visualize incision  NEURO:   Cranial nerves 2-12 are normal tested and grossly at patient's baseline  PSYCH:  oriented to person and place    SNF labs: Labs done in SNF are in [facility] EPIC. Please refer to them using [facility] Everywhere.    DISCHARGE PLAN:    Follow up labs: No labs orders/due    Medical Follow Up:      Follow up with primary care provider in 1 week    MTM referral needed and placed by this provider: No    Current [facility] scheduled appointments:  Next 5 appointments (look out 90 days)    Dec 19, 2019  2:00 PM CST  Return Visit with Juliana Cardenas PA-C  [facility] [address] [place] [id number]  [phone]           Discharge Services: Will transfer to [facility]    TOTAL DISCHARGE TIME:   Greater than 30 minutes  Electronically signed by:  AMIE Mondragon CNP            "

## 2019-11-10 ENCOUNTER — TELEPHONE (OUTPATIENT)
Dept: GERIATRICS | Facility: CLINIC | Age: 59
End: 2019-11-10

## 2019-11-10 NOTE — TELEPHONE ENCOUNTER
Patient due to move to another SNF tomorrow but decided to leave today against AMA.     Electronically signed by AMIE Reece GNP

## 2019-11-14 ENCOUNTER — TELEPHONE (OUTPATIENT)
Dept: FAMILY MEDICINE | Facility: CLINIC | Age: 59
End: 2019-11-14

## 2019-11-14 DIAGNOSIS — G82.20 PARAPLEGIA (H): Primary | ICD-10-CM

## 2019-11-14 NOTE — TELEPHONE ENCOUNTER
Spoke with Turner from adult protection- patient left [facility] on 11/10/19, per patient he was kicked out but per notes patient left AMA and was scheduled to move to a new skilled nursing facility on 11/11.  Copied from 11/7/19 discharge summary: Patient was unable to safely transfer with sliding board transfers due to poor trunk control. Required an EZ stand. Reached a plateau in Physical and Occupational Therapy. Family unable to afford EZ stand so the decision was made to transfer to [facility].   Unsure if patient did not understand due to language barrier or if patient wanted to go home.  Patient is wheelchair bound and per notes used an EZ-stand for transfers.  He discharged to home but has a full flight of stairs to get inside, and this requires the help of 2 people to get patient in and out of house.  Patient was agreeable to get a stair lift if medicare will cover to help with mobility in home.   Referral pending for home care but Turner stated this will require an office visit- he did inform patient of this and he plans on asking family to help him out of the house for visit.   Referral pending for care coordination as well to help with transition.   Anna Lott RN

## 2019-11-14 NOTE — TELEPHONE ENCOUNTER
Reason for Call:  Other call back    Detailed comments: Turner @ [facility] calling stating that the patient was discharged from skilled nursing and is home bund and wheel chair bound now. Has a few questions on options to get care sent to patient. Please call to advise     Phone Number Patient can be reached at: Other phone number:  [phone]    Best Time: any     Can we leave a detailed message on this number? YES    Call taken on 11/14/2019 at 2:13 PM by Amaya Mendoza

## 2019-11-15 ENCOUNTER — PATIENT OUTREACH (OUTPATIENT)
Dept: CARE COORDINATION | Facility: CLINIC | Age: 59
End: 2019-11-15

## 2019-11-15 NOTE — PROGRESS NOTES
Used a Arpit    CHW   Patient expressed he did not want to go back to the recent nursing home due to it being very expensive.   CHW explained that he needs to go in and see a doctor, or ED to get assessed so we can get the correct referral.   Patient expressed he would make an apt with his doctor within the next week to get the correct referral for us.            [facility] ([phone])

## 2019-11-15 NOTE — TELEPHONE ENCOUNTER
Reason for Call:  Other call back    Detailed comments: Pt calling back, pt fell down three times since returning. Would like to discuss getting into a home care.    Phone Number Patient can be reached at: Home number on file [phone] (home)    Best Time: any    Can we leave a detailed message on this number? YES    Call taken on 11/15/2019 at 8:59 AM by Wilfred Herrera

## 2019-11-17 ENCOUNTER — MEDICAL CORRESPONDENCE (OUTPATIENT)
Dept: HEALTH INFORMATION MANAGEMENT | Facility: CLINIC | Age: 59
End: 2019-11-17

## 2019-11-17 ENCOUNTER — DOCUMENTATION ONLY (OUTPATIENT)
Dept: CARE COORDINATION | Facility: CLINIC | Age: 59
End: 2019-11-17

## 2019-11-18 NOTE — PROGRESS NOTES
[facility] now requests orders and shares plan of care/discharge summaries for some patients through [employer]  Please REPLY TO THIS MESSAGE OR ROUTE BACK TO THE AUTHOR in order to give authorization for orders when needed.  This is considered a verbal order, you will still receive a faxed copy of orders for signature.  Thank you for your assistance in improving collaboration for our patients.    ORDER  SN 2W2, 2 PRN  PT Karishma GUNTER SUMMARY/PLAN OF CARE    SUMMARY TO MD  SITUATION  Patient is a 58 year old male being admitted to Home Care.  He has been instructed on the need to see his PMD within the next 2 weeks.  He lives in a home with his wife who assists him with ADL/IADLs that he is not able to complete.  He has to have his sons carry him down the stairs to get out of the house as there is no other way for him to get out.  He had a SP Laminectomy in September and then went to a TCU.  It is unclear if he was discharged appropriately from the TCU but he stated he was.  His surgical site is CDI and completed closed.  He stated he has chronic back pain that he rates from a 1-10/10 at times.  He stated the Naproxen helps with the pain.  His skin is intact except for some small bruises on his legs.  He stated he is depressed and that he is taking his medication.   His BP was high today due to not taking his medication yet.  He was instructed to make sure he takes his medication as prescribed.  He stated he has slid out of his WC 3 times since coming home to get to the floor to sleep, as his bed is on the floor in the living room, when no one was there to assist him, even though he has a method in place to get to the floor in gradual movements to prevent falling.  He was instructed on safety to prevent falls.  He was instructed on increasing his water as he is not drinking enough and to increase his protein to help with energy and strength.  His vital today /102, P 73, RR 16, T 96.6, SPO2 98  percent on RA, lungs CTA.    BACKGROUND Patient was hositalized for a SP Laminectomy, he also has a history of low back pain, spinal meningioma, obesity, urinary retention and depression.   ANALYSIS Patient will benefit from SN for health management and education, PT for lower body strength, mobility and safety, he declined OT, HHA and SW  RECOMMENDATION SN 2W2, 2 PRN, PT Eval

## 2019-11-20 ENCOUNTER — DOCUMENTATION ONLY (OUTPATIENT)
Dept: CARE COORDINATION | Facility: CLINIC | Age: 59
End: 2019-11-20

## 2019-11-20 NOTE — PROGRESS NOTES
[facility] now requests orders and shares plan of care/discharge summaries for some patients through [employer].  Please REPLY TO THIS MESSAGE OR ROUTE BACK TO THE AUTHOR in order to give authorization for orders when needed.  This is considered a verbal order, you will still receive a faxed copy of orders for signature.  Thank you for your assistance in improving collaboration for our patients.    ORDER    PT eval completed today.  Requesting okay for PT 1xwx3 for transfer training, establish appropriate HEP, and general home safety with mobility.     Thank you,   Cathy Andrews, PT

## 2019-11-26 ENCOUNTER — TELEPHONE (OUTPATIENT)
Dept: FAMILY MEDICINE | Facility: CLINIC | Age: 59
End: 2019-11-26

## 2019-11-26 NOTE — TELEPHONE ENCOUNTER
Reason for call:  Other   Patient called regarding (reason for call): prescription  Additional comments:  Shira calling. She has a question about hydrochlorothiazide.  Is the patient to be taking it?     Phone number to reach patient:  Other phone number:      Best Time:   Any     Can we leave a detailed message on this number?  YES

## 2019-11-26 NOTE — TELEPHONE ENCOUNTER
Spoke with home care nurse regarding medications, KAY patient is scheduled with provider on 12/3/19.  Patient was discharged from TCU/left AMA on 11/10/19.  He is having swelling in lower legs so he decided to start taking the hydrochlorothiazide again- this was discontinued on 10/25/19.  Patient is also still taking naproxen 500mg BID (this is discontinued on 9/25/19).  She is wondering if the Naproxen is interacting with other medications causing the swelling.  Reviewed med list the following medications are on clinic med list but not on patients TCU discharge paperwork: Atorvastatin 20mg, Advair inhaler, robaxin, Miralax, oxycodone, and senna.    Anna Lott RN

## 2019-11-29 NOTE — PROGRESS NOTES
"Subjective     Elaina Valenzuela is a 58 year old male who presents to clinic today for the following health issues:    HPI   Chief Complaint   Patient presents with     Referral     swimming therapy in [place]     Paraplegia persists after spine surgery however has improved somewaht  Lives at home in [place]  He was not happy with home PT, home physical therapy was not effective for him as he said \"there was not enough action and too much talking\"   Transportation -mobility and PCA    Left leg swelling x 1 month, not painful, no redness, no pulmonary symptoms.    FMLA paperwork needs to be signed    BP Readings from Last 3 Encounters:   12/03/19 138/82   11/07/19 (!) 151/94   11/04/19 (!) 139/91    Wt Readings from Last 3 Encounters:   12/03/19 79.8 kg (176 lb)   11/07/19 80 kg (176 lb 4.8 oz)   11/04/19 80 kg (176 lb 4.8 oz)                      Reviewed and updated as needed this visit by Provider  Tobacco  Allergies  Meds  Problems  Med Hx  Surg Hx  Fam Hx         Review of Systems   Review Of Systems   ROS: 10 point ROS neg other than the symptoms noted above in the HPI.      Objective    /82   Pulse 67   Temp 97.2  F (36.2  C) (Oral)   Resp 16   Wt 79.8 kg (176 lb)   SpO2 96%   BMI 29.29 kg/m    Body mass index is 29.29 kg/m .  Physical Exam   GENERAL: healthy, alert and no distress  EYES: Eyes grossly normal to inspection, PERRL and conjunctivae and sclerae normal  NECK: no adenopathy, no asymmetry, masses, or scars and thyroid normal to palpation  SKIN: no suspicious lesions or rashes  MS - left leg pitting edema greater than right, non-tender, no erythema, (-)danie's  PSYCH: mentation appears normal, affect normal/bright          Assessment & Plan       ICD-10-CM    1. Paraplegia (H) G82.20 PHYSICAL THERAPY REFERRAL     PHYSICAL THERAPY REFERRAL   2. Spinal meningioma (H) D32.1 PHYSICAL THERAPY REFERRAL     PHYSICAL THERAPY REFERRAL     NEUROLOGY ADULT REFERRAL   3. Muscle " weakness of lower extremity M62.81 PHYSICAL THERAPY REFERRAL     PHYSICAL THERAPY REFERRAL     NEUROLOGY ADULT REFERRAL   4. Moderate persistent asthma without complication J45.40 mometasone-formoterol (DULERA) 100-5 MCG/ACT inhaler   5. Essential hypertension I10 hydrochlorothiazide (HYDRODIURIL) 25 MG tablet     Comprehensive metabolic panel     Albumin Random Urine Quantitative with Creat Ratio   6. Left leg swelling M79.89 US Lower Extremity Venous Duplex Left     Comprehensive metabolic panel     Albumin Random Urine Quantitative with Creat Ratio   7. Iron deficiency anemia, unspecified iron deficiency anemia type D50.9 Comprehensive metabolic panel     Albumin Random Urine Quantitative with Creat Ratio     Iron and iron binding capacity     Ferritin     Blood Morphology Pathologist Review     CBC with platelets differential     Reticulocyte Count     Vitamin B12     Folate   Low suspicion for DVT, however given unilateral nature and sedentary lifestyle, will order doppler  Water PT at sister [facility] as requested  General PT as well  Asthma - medication refill  Med rec  HTN - medication refill       A total of 40 minutes spent face-to-face with greater than 50% of the time spent in counseling and coordinating cares of the issues above   Follow-up in 1 month  Rosas Escalante MD  [facility]

## 2019-12-02 NOTE — PROGRESS NOTES
Reason for Call:  Form, our goal is to have forms completed with 72 hours, however, some forms may require a visit or additional information.    Type of letter, form or note:  Home Health Certification    Who is the form from?: Home care ([facility]) 11-17-19 to 1-    Where did the form come from: form was faxed in    What clinic location was the form placed at?: [facility]    Where the form was placed: Given to MA/RN    What number is listed as a contact on the form?: [phone]       Additional comments:     Call taken on 12/2/2019 at 8:37 AM by Ruby Jones

## 2019-12-02 NOTE — PROGRESS NOTES
Spoke with Shira HERNANDEZ with [facility]. States pt was discharged from homecare on Friday. Pt did not want any further PT and is not getting his transfer board to get from the chair to the bed. Pt wants to go to the [facility]. Pt is independent with his meds. Wants to go to pool therapy. Pt is non compliant.     Called pt via [employer] [facility]  to review medications below. Note that pt has an appt for tomorrow with Dr. Flores. Left message for pt to call back to the RN hotline.      Home care medication list reviewed with Epic medication list    Discrepancies noted:  Citalopram we have 20mg daily, they have 10mg and 20mg listed.   Albuterol we have 2 puffs every 6 hours as needed, they have 1 puff every 6 hours as needed  Acetaminophen we have 325mg take 650mg every 4 hours as needed for mild pain they have ES 500mg take 2 tablets every 4-6 hours as needed  Benefiber we have 2 tbsp daily, they have 2tsp daily    They have that we don't have:  Albuterol sulfate 2.5mg/3ml inhale 3ml by neb 4 times daily as needed  Naproxen 500mg tablet 1 tablet 2 times daily as needed    We have that they don't have:  Atorvastatin 20mg daily  Advair 100-50 inhale 1 puff 2 times daily  Robaxin 750mg 1 tablet every 6 hours as needed for muscle spasms  Oxycodone 5mg 1-2 tablets every 3 hours as needed for moderate to severe pain  Polyethylene glycol 17g by mouth daily as needed for constipation  Senna-docusate 8.6-50 take 2 tablets by mouth 2 times daily    Chantal Ortiz RN  [facility]

## 2019-12-03 ENCOUNTER — OFFICE VISIT (OUTPATIENT)
Dept: FAMILY MEDICINE | Facility: CLINIC | Age: 59
End: 2019-12-03
Payer: COMMERCIAL

## 2019-12-03 VITALS
HEART RATE: 67 BPM | BODY MASS INDEX: 29.29 KG/M2 | OXYGEN SATURATION: 96 % | RESPIRATION RATE: 16 BRPM | TEMPERATURE: 97.2 F | WEIGHT: 176 LBS | DIASTOLIC BLOOD PRESSURE: 82 MMHG | SYSTOLIC BLOOD PRESSURE: 138 MMHG

## 2019-12-03 DIAGNOSIS — J45.40 MODERATE PERSISTENT ASTHMA WITHOUT COMPLICATION: ICD-10-CM

## 2019-12-03 DIAGNOSIS — D50.9 IRON DEFICIENCY ANEMIA, UNSPECIFIED IRON DEFICIENCY ANEMIA TYPE: ICD-10-CM

## 2019-12-03 DIAGNOSIS — I10 ESSENTIAL HYPERTENSION: ICD-10-CM

## 2019-12-03 DIAGNOSIS — M79.89 LEFT LEG SWELLING: ICD-10-CM

## 2019-12-03 DIAGNOSIS — D32.1 SPINAL MENINGIOMA (H): ICD-10-CM

## 2019-12-03 DIAGNOSIS — M62.81 MUSCLE WEAKNESS OF LOWER EXTREMITY: ICD-10-CM

## 2019-12-03 DIAGNOSIS — G82.20 PARAPLEGIA (H): Primary | ICD-10-CM

## 2019-12-03 PROCEDURE — 99215 OFFICE O/P EST HI 40 MIN: CPT | Performed by: FAMILY MEDICINE

## 2019-12-03 RX ORDER — HYDROCHLOROTHIAZIDE 25 MG/1
25 TABLET ORAL EVERY MORNING
Refills: 1 | COMMUNITY
Start: 2019-06-15 | End: 2019-12-03

## 2019-12-03 RX ORDER — HYDROCHLOROTHIAZIDE 25 MG/1
25 TABLET ORAL DAILY
COMMUNITY
Start: 2019-12-03 | End: 2020-06-30

## 2019-12-03 RX ORDER — NAPROXEN 500 MG/1
500 TABLET ORAL 2 TIMES DAILY PRN
COMMUNITY
Start: 2019-08-02 | End: 2019-12-04 | Stop reason: DRUGHIGH

## 2019-12-03 RX ORDER — AMITRIPTYLINE HYDROCHLORIDE 50 MG/1
TABLET ORAL
COMMUNITY
Start: 2019-08-02 | End: 2019-12-04 | Stop reason: DRUGHIGH

## 2019-12-03 RX ORDER — INHALER, ASSIST DEVICES
SPACER (EA) MISCELLANEOUS
COMMUNITY
Start: 2019-09-13 | End: 2020-03-23

## 2019-12-03 RX ORDER — IMIPRAMINE HYDROCHLORIDE 25 MG/1
TABLET ORAL
COMMUNITY
Start: 2019-09-13

## 2019-12-04 RX ORDER — ALBUTEROL SULFATE 0.83 MG/ML
2.5 SOLUTION RESPIRATORY (INHALATION) EVERY 6 HOURS PRN
COMMUNITY
Start: 2019-12-04 | End: 2021-10-15

## 2019-12-04 RX ORDER — ACETAMINOPHEN 500 MG
1000 TABLET ORAL EVERY 6 HOURS PRN
COMMUNITY
Start: 2019-12-04

## 2019-12-04 RX ORDER — AMITRIPTYLINE HYDROCHLORIDE 100 MG/1
100 TABLET ORAL
COMMUNITY
Start: 2019-12-04

## 2019-12-04 RX ORDER — NAPROXEN 500 MG/1
TABLET ORAL
COMMUNITY
Start: 2019-12-04 | End: 2020-06-30

## 2019-12-04 NOTE — PROGRESS NOTES
Epic med list updated. Home care med list updated. Placed home health certification form in provider's folder for review and signature. Please fax to home health once signed. Thanks.    Chantal Ortiz RN  [facility]

## 2019-12-05 DIAGNOSIS — Z53.9 DIAGNOSIS NOT YET DEFINED: Primary | ICD-10-CM

## 2019-12-05 DIAGNOSIS — I10 ESSENTIAL HYPERTENSION: ICD-10-CM

## 2019-12-05 DIAGNOSIS — D50.9 IRON DEFICIENCY ANEMIA, UNSPECIFIED IRON DEFICIENCY ANEMIA TYPE: ICD-10-CM

## 2019-12-05 DIAGNOSIS — M79.89 LEFT LEG SWELLING: ICD-10-CM

## 2019-12-05 LAB
ALBUMIN SERPL-MCNC: 3.4 G/DL (ref 3.4–5)
ALP SERPL-CCNC: 90 U/L (ref 40–150)
ALT SERPL W P-5'-P-CCNC: 28 U/L (ref 0–70)
ANION GAP SERPL CALCULATED.3IONS-SCNC: 3 MMOL/L (ref 3–14)
AST SERPL W P-5'-P-CCNC: 30 U/L (ref 0–45)
BASOPHILS # BLD AUTO: 0.1 10E9/L (ref 0–0.2)
BASOPHILS NFR BLD AUTO: 0.6 %
BILIRUB SERPL-MCNC: 0.6 MG/DL (ref 0.2–1.3)
BUN SERPL-MCNC: 7 MG/DL (ref 7–30)
CALCIUM SERPL-MCNC: 8.9 MG/DL (ref 8.5–10.1)
CHLORIDE SERPL-SCNC: 105 MMOL/L (ref 94–109)
CO2 SERPL-SCNC: 32 MMOL/L (ref 20–32)
CREAT SERPL-MCNC: 0.79 MG/DL (ref 0.66–1.25)
CREAT UR-MCNC: 71 MG/DL
DIFFERENTIAL METHOD BLD: ABNORMAL
EOSINOPHIL # BLD AUTO: 0.4 10E9/L (ref 0–0.7)
EOSINOPHIL NFR BLD AUTO: 3.9 %
ERYTHROCYTE [DISTWIDTH] IN BLOOD BY AUTOMATED COUNT: 16.4 % (ref 10–15)
FERRITIN SERPL-MCNC: 91 NG/ML (ref 26–388)
FOLATE SERPL-MCNC: 9.2 NG/ML
GFR SERPL CREATININE-BSD FRML MDRD: >90 ML/MIN/{1.73_M2}
GLUCOSE SERPL-MCNC: 102 MG/DL (ref 70–99)
HCT VFR BLD AUTO: 37.8 % (ref 40–53)
HGB BLD-MCNC: 12 G/DL (ref 13.3–17.7)
IRON SATN MFR SERPL: 17 % (ref 15–46)
IRON SERPL-MCNC: 54 UG/DL (ref 35–180)
LYMPHOCYTES # BLD AUTO: 2.6 10E9/L (ref 0.8–5.3)
LYMPHOCYTES NFR BLD AUTO: 28.5 %
MCH RBC QN AUTO: 27.9 PG (ref 26.5–33)
MCHC RBC AUTO-ENTMCNC: 31.7 G/DL (ref 31.5–36.5)
MCV RBC AUTO: 88 FL (ref 78–100)
MICROALBUMIN UR-MCNC: 20 MG/L
MICROALBUMIN/CREAT UR: 28.23 MG/G CR (ref 0–17)
MONOCYTES # BLD AUTO: 0.8 10E9/L (ref 0–1.3)
MONOCYTES NFR BLD AUTO: 8.5 %
NEUTROPHILS # BLD AUTO: 5.3 10E9/L (ref 1.6–8.3)
NEUTROPHILS NFR BLD AUTO: 58.5 %
PLATELET # BLD AUTO: 352 10E9/L (ref 150–450)
POTASSIUM SERPL-SCNC: 3 MMOL/L (ref 3.4–5.3)
PROT SERPL-MCNC: 8 G/DL (ref 6.8–8.8)
RBC # BLD AUTO: 4.3 10E12/L (ref 4.4–5.9)
RETICS # AUTO: 111 10E9/L (ref 25–95)
RETICS/RBC NFR AUTO: 2.6 % (ref 0.5–2)
SODIUM SERPL-SCNC: 140 MMOL/L (ref 133–144)
TIBC SERPL-MCNC: 315 UG/DL (ref 240–430)
VIT B12 SERPL-MCNC: 465 PG/ML (ref 193–986)
WBC # BLD AUTO: 9.1 10E9/L (ref 4–11)

## 2019-12-05 PROCEDURE — 83540 ASSAY OF IRON: CPT | Performed by: FAMILY MEDICINE

## 2019-12-05 PROCEDURE — 82746 ASSAY OF FOLIC ACID SERUM: CPT | Performed by: FAMILY MEDICINE

## 2019-12-05 PROCEDURE — G0180 MD CERTIFICATION HHA PATIENT: HCPCS | Performed by: FAMILY MEDICINE

## 2019-12-05 PROCEDURE — 36415 COLL VENOUS BLD VENIPUNCTURE: CPT | Performed by: FAMILY MEDICINE

## 2019-12-05 PROCEDURE — 83550 IRON BINDING TEST: CPT | Performed by: FAMILY MEDICINE

## 2019-12-05 PROCEDURE — 85060 BLOOD SMEAR INTERPRETATION: CPT | Performed by: FAMILY MEDICINE

## 2019-12-05 PROCEDURE — 82607 VITAMIN B-12: CPT | Performed by: FAMILY MEDICINE

## 2019-12-05 PROCEDURE — 82043 UR ALBUMIN QUANTITATIVE: CPT | Performed by: FAMILY MEDICINE

## 2019-12-05 PROCEDURE — 82728 ASSAY OF FERRITIN: CPT | Performed by: FAMILY MEDICINE

## 2019-12-05 PROCEDURE — 85045 AUTOMATED RETICULOCYTE COUNT: CPT | Performed by: FAMILY MEDICINE

## 2019-12-05 PROCEDURE — 85025 COMPLETE CBC W/AUTO DIFF WBC: CPT | Performed by: FAMILY MEDICINE

## 2019-12-05 PROCEDURE — 80053 COMPREHEN METABOLIC PANEL: CPT | Performed by: FAMILY MEDICINE

## 2019-12-09 DIAGNOSIS — E61.1 IRON DEFICIENCY: Primary | ICD-10-CM

## 2019-12-09 LAB — COPATH REPORT: NORMAL

## 2019-12-09 RX ORDER — FERROUS SULFATE 325(65) MG
325 TABLET, DELAYED RELEASE (ENTERIC COATED) ORAL EVERY OTHER DAY
Qty: 45 TABLET | Refills: 1 | Status: SHIPPED | OUTPATIENT
Start: 2019-12-09 | End: 2020-11-11

## 2019-12-10 ENCOUNTER — TELEPHONE (OUTPATIENT)
Dept: FAMILY MEDICINE | Facility: CLINIC | Age: 59
End: 2019-12-10

## 2019-12-10 ENCOUNTER — MEDICAL CORRESPONDENCE (OUTPATIENT)
Dept: HEALTH INFORMATION MANAGEMENT | Facility: CLINIC | Age: 59
End: 2019-12-10

## 2019-12-10 DIAGNOSIS — G82.20 PARAPLEGIA (H): Primary | ICD-10-CM

## 2019-12-10 NOTE — TELEPHONE ENCOUNTER
Voice mail left for patient to call RN hotline at [phone].    Are you looking for personal care assistant services? 4-6 hours a day is typically not a covered home care service so this would likely be private pay- is this something you are interested in.   Anna Lott RN

## 2019-12-10 NOTE — TELEPHONE ENCOUNTER
Reason for Call:  Other Letter of Certification    Detailed comments: patient needs home health care to come in 4 - 6 hours daily and help him.    Please advise.    Phone Number Patient can be reached at: Cell number on file:    Telephone Information:   Mobile [phone]       Best Time: asap    Can we leave a detailed message on this number? YES    Call taken on 12/10/2019 at 3:09 PM by Melisa Cruz

## 2019-12-11 ENCOUNTER — PATIENT OUTREACH (OUTPATIENT)
Dept: CARE COORDINATION | Facility: CLINIC | Age: 59
End: 2019-12-11

## 2019-12-11 NOTE — TELEPHONE ENCOUNTER
Called patient. He states that he is looking for a PCA to help take care of him in the home because he can't stand or walk.   Called [facility], they state that the patient was discharged from home care. They do not do PCA services. Please advise on further recommendation/next steps for the patient.

## 2019-12-11 NOTE — PROGRESS NOTES
The Clinic Community Health Worker spoke with  the patient today to discuss possible Clinic Care Coordination enrollment.  The service was described to the patient and immediate needs were discussed.  The patient agreed to enrollment and an assessment was scheduled.  The patient was provided with contact information for the clinic CHW.             Assessment date: 12/20/19 @ 2:15 w/ SW, Arpit  will be there to help assist.   No longer works with price ( as stated in the snapshot, under goals)     Is needing PCA hours, other help. Patient cannot currently walk or stand very well.

## 2019-12-12 ENCOUNTER — TELEPHONE (OUTPATIENT)
Dept: FAMILY MEDICINE | Facility: CLINIC | Age: 59
End: 2019-12-12

## 2019-12-12 NOTE — LETTER
[facility]  [address]  [place] [address]  [phone]    December 12, 2019      Elaina Valenzuela  [address] [address] [facility] [address] [address]      Dear Elaina,     Your clinic record indicates that you are due for an asthma update. We have a survey tool called an ACT (or Asthma Control Test) we use to measure the level of control of your asthma. Please complete the enclosed questionnaire and mail it back to us in the self-addressed stamped envelope.     If you have questions about this letter please contact your provider.     Sincerely,    Your [facility]

## 2019-12-12 NOTE — TELEPHONE ENCOUNTER
ACT and letter mailed to patient. Postponing encounter.  Chantal GARCIA CMA ([facility])

## 2019-12-13 ENCOUNTER — PATIENT OUTREACH (OUTPATIENT)
Dept: CARE COORDINATION | Facility: CLINIC | Age: 59
End: 2019-12-13

## 2019-12-19 ENCOUNTER — TELEPHONE (OUTPATIENT)
Dept: FAMILY MEDICINE | Facility: CLINIC | Age: 59
End: 2019-12-19

## 2019-12-19 DIAGNOSIS — D32.1 SPINAL MENINGIOMA (H): ICD-10-CM

## 2019-12-19 DIAGNOSIS — M62.81 MUSCLE WEAKNESS OF LOWER EXTREMITY: ICD-10-CM

## 2019-12-19 DIAGNOSIS — G82.20 PARAPLEGIA (H): Primary | ICD-10-CM

## 2019-12-19 NOTE — TELEPHONE ENCOUNTER
Voice mail left for Aneesh to call RN hotline at [phone].    What type of referral is needed, physical therapy?

## 2019-12-19 NOTE — TELEPHONE ENCOUNTER
Per Aneesh, this would be for patient to specifically see a physician in physical medicine and rehab to help treat spinal cord injury that resulted in paraplegia which patient has been seen through [facility] and Dr. Escalante for     Please fax referral to [phone] so they can get patient scheduled  Referral pended and set to local print    Gene Polk RN

## 2019-12-19 NOTE — TELEPHONE ENCOUNTER
Reason for call:  Order   Order or referral being requested: a referral for physical medicine and rehab  Reason for request: new spinal cord injury  Date needed: as soon as possible  Has the patient been seen by the PCP for this problem? NO    Additional comments:  Na     Phone number to reach patient:  Home number on file [phone] (home)    Best Time:  Any     Can we leave a detailed message on this number?  YES

## 2019-12-20 ENCOUNTER — ALLIED HEALTH/NURSE VISIT (OUTPATIENT)
Dept: NURSING | Facility: CLINIC | Age: 59
End: 2019-12-20
Payer: COMMERCIAL

## 2019-12-20 DIAGNOSIS — Z71.89 COUNSELING AND COORDINATION OF CARE: Primary | ICD-10-CM

## 2019-12-20 PROCEDURE — 99207 ZZC NO CHARGE LOS: CPT

## 2019-12-20 NOTE — PROGRESS NOTES
Clinic Care Coordination Contact  Care Team Conversations    Patient here today (12/20/2019) for PCA assessment/services.   present with Patient.  Patient reports that he did not qualify previously for MA and [facility] services when they last visited his home a year or so ago.  He stated they were over income.  He reports there is no change in household income.  SW discussed guidelines for PCA, mainly that MA must be in place for these services.  Patient reports his spouse works during  The day and he manages at home per self until she returns.  He reports that spouse makes breakfast for him and has lunch ready (an easy lunch) until she makes dinner at night.  SW inquired if Patient had other needs, he stated he did not and thanked SW for her time.  This was a brief visit and minimal assessment was completed.  Patient reports he may call the [facility] for reassessment.  Patient was not interested in a future call.  SW provided her contact information for future need.      Plan: 1) SW will update PCP/care team and no there will be no further intervention from this SW    Corrina Hooker, SHARONW, MSW   [facility]  Care Coordination  [facility]   [phone]  12/23/2019 9:58 AM

## 2019-12-23 ENCOUNTER — TRANSFERRED RECORDS (OUTPATIENT)
Dept: HEALTH INFORMATION MANAGEMENT | Facility: CLINIC | Age: 59
End: 2019-12-23

## 2019-12-23 NOTE — TELEPHONE ENCOUNTER
Aneesh returned call to RN Hotline. She states that original fax number given was incorrect and referral should be faxed to [phone].   Her number is [phone].    Dr. Escalante, please advise on referral request below.    Debbie Boston RN

## 2020-01-09 NOTE — TELEPHONE ENCOUNTER
Called and left patient VM to return call to complete ACT  Jackie Herrera CMA on 1/9/2020 at 7:04 AM

## 2020-01-10 NOTE — TELEPHONE ENCOUNTER
2nd attempt. Called and left patient VM to return call to complete ACT    Jackie Herrera CMA on 1/10/2020 at 7:21 AM

## 2020-03-31 DIAGNOSIS — G60.9 IDIOPATHIC PERIPHERAL NEUROPATHY: ICD-10-CM

## 2020-03-31 NOTE — TELEPHONE ENCOUNTER
gabapentin (NEURONTIN) 300 MG capsule       Last Written Prescription Date:  08/02/2019  Last Fill Quantity: 360,   # refills: 1  Last Office Visit: 12/03/2019  Future Office visit:       Routing refill request to provider for review/approval because:  Drug not on the [facility], [facility] or [facility] refill protocol or controlled substance    An Herrera, CMA

## 2020-04-01 RX ORDER — GABAPENTIN 300 MG/1
600 CAPSULE ORAL 3 TIMES DAILY
Qty: 360 CAPSULE | Refills: 1 | Status: SHIPPED | OUTPATIENT
Start: 2020-04-01 | End: 2020-06-30

## 2020-04-01 NOTE — TELEPHONE ENCOUNTER
Routing refill request to provider for review/approval because:  Drug not on the [facility] refill protocol     Requested Prescriptions   Pending Prescriptions Disp Refills     gabapentin (NEURONTIN) 300 MG capsule 360 capsule 1     Sig: Take 2 capsules (600 mg) by mouth 3 times daily       There is no refill protocol information for this order        Debbie Boston RN

## 2020-04-23 ENCOUNTER — TELEPHONE (OUTPATIENT)
Dept: FAMILY MEDICINE | Facility: CLINIC | Age: 60
End: 2020-04-23

## 2020-04-23 NOTE — TELEPHONE ENCOUNTER
Please note that this medication was written by Dr.Arun Josue (patient no longer sees this MD). This RX was prescribed as Baclofen 10mg with 1 BID directions. Patient was wondering if you could increase this for him. If you have questions as to why, please contact patient.

## 2020-04-24 NOTE — TELEPHONE ENCOUNTER
Yes, please set up virtual visit.  If I remember correctly, he may need an .    Rosas Escalante MD

## 2020-04-24 NOTE — TELEPHONE ENCOUNTER
baclofen (LIORESAL) 10 mg tablet    Last Written Prescription Date:  n/a  Last Fill Quantity: n/a,   # refills: n/a  Last Office Visit: 12/3/2020  Future Office visit:       Routing refill request to provider for review/approval because:  Drug not active on patient's medication list      From [facility]:  3/23/2020  baclofen (LIORESAL) 10 mg tablet   Take 1 tablet by mouth 3 times daily. Take 10 in am, 10 at noon and 20 mg (2 pills at night)

## 2020-04-24 NOTE — TELEPHONE ENCOUNTER
Called patient via  phone and left message to return call to schedule for a telephone visit  Jackie Herrera CMA on 4/24/2020 at 1:57 PM

## 2020-04-27 NOTE — TELEPHONE ENCOUNTER
Called patient via  phone and left a message to return call to clinic to schedule for a telephone visit.  LOTTIE Finley

## 2020-04-28 DIAGNOSIS — E78.5 HYPERLIPIDEMIA LDL GOAL <100: ICD-10-CM

## 2020-04-28 DIAGNOSIS — I10 ESSENTIAL HYPERTENSION WITH GOAL BLOOD PRESSURE LESS THAN 140/90: ICD-10-CM

## 2020-04-28 NOTE — TELEPHONE ENCOUNTER
"Routing refill request to provider for review/approval because:  Labs out of range:  LDL  Requested Prescriptions   Pending Prescriptions Disp Refills     atorvastatin (LIPITOR) 20 MG tablet 30 tablet 0     Sig: Take 1 tablet (20 mg) by mouth daily       Statins Protocol Passed - 4/28/2020  1:04 PM        Passed - LDL on file in past 12 months     Recent Labs   Lab Test 05/17/19  1746   *             Passed - No abnormal creatine kinase in past 12 months     No lab results found.             Passed - Recent (12 mo) or future (30 days) visit within the authorizing provider's specialty     Patient has had an office visit with the authorizing provider or a provider within the authorizing providers department within the previous 12 mos or has a future within next 30 days. See \"Patient Info\" tab in inbasket, or \"Choose Columns\" in Meds & Orders section of the refill encounter.              Passed - Medication is active on med list        Passed - Patient is age 18 or older           Anna Lott RN          "

## 2020-04-28 NOTE — TELEPHONE ENCOUNTER
3rd attempt. Called patient via  phone and left message to return call to schedule for a telephone visit. Closing chart    Jackie Herrera CMA on 4/28/2020 at 9:10 AM

## 2020-05-01 RX ORDER — ATORVASTATIN CALCIUM 20 MG/1
20 TABLET, FILM COATED ORAL DAILY
Qty: 90 TABLET | Refills: 1 | Status: SHIPPED | OUTPATIENT
Start: 2020-05-01 | End: 2020-06-30

## 2020-06-12 DIAGNOSIS — I10 ESSENTIAL HYPERTENSION: ICD-10-CM

## 2020-06-12 NOTE — TELEPHONE ENCOUNTER
Patient is out of medication.    Thank you,  Loreta Daniels, PharmD  [facility]  [phone]

## 2020-06-12 NOTE — TELEPHONE ENCOUNTER
"Routing refill request to provider for review/approval because:  Labs out of range:  K    Requested Prescriptions   Pending Prescriptions Disp Refills    hydrochlorothiazide (HYDRODIURIL) 25 MG tablet       Sig: Take 1 tablet (25 mg) by mouth daily       Diuretics (Including Combos) Protocol Failed - 6/12/2020  9:37 AM        Failed - Normal serum potassium on file in past 12 months     Recent Labs   Lab Test 12/05/19  1242   POTASSIUM 3.0*                    Passed - Blood pressure under 140/90 in past 12 months     BP Readings from Last 3 Encounters:   12/03/19 138/82   11/07/19 (!) 151/94   11/04/19 (!) 139/91                 Passed - Recent (12 mo) or future (30 days) visit within the authorizing provider's specialty     Patient has had an office visit with the authorizing provider or a provider within the authorizing providers department within the previous 12 mos or has a future within next 30 days. See \"Patient Info\" tab in inbasket, or \"Choose Columns\" in Meds & Orders section of the refill encounter.              Passed - Medication is active on med list        Passed - Patient is age 18 or older        Passed - Normal serum creatinine on file in past 12 months     Recent Labs   Lab Test 12/05/19  1242   CR 0.79              Passed - Normal serum sodium on file in past 12 months     Recent Labs   Lab Test 12/05/19  1242                    Debbie Boston RN  "

## 2020-06-18 RX ORDER — HYDROCHLOROTHIAZIDE 25 MG/1
25 TABLET ORAL DAILY
OUTPATIENT
Start: 2020-06-18

## 2020-06-18 NOTE — TELEPHONE ENCOUNTER
Called patient with . Left VM to call back to scheduling line to schedule appointment. Dian Mayfield MA

## 2020-06-26 DIAGNOSIS — K59.00 CONSTIPATION: Primary | ICD-10-CM

## 2020-06-26 NOTE — LETTER
June 30, 2020          Elaina Valenzuela,  [address]   [place] [address]          Dear Elaina Valenzuela      Your provider has not sent your a refill for senna-docusate (SENOKOT-S/PERICOLACE) 8.6-50 MG tablet because you are due for an appointment for further refills. We are currently only able to see select in person visits. We ask that you schedule a telephone visit, video visit or evisit (through [email or web address] with your provider.  Please contact the clinic to schedule an appointment for further refills.     Sincerely,         Your [facility] Care Team/

## 2020-06-26 NOTE — TELEPHONE ENCOUNTER
Routing refill request to provider for review/approval because:  Medication is reported/historical- needs dosing instructions.   Anna Lott RN

## 2020-06-29 RX ORDER — AMOXICILLIN 250 MG
1 CAPSULE ORAL
OUTPATIENT
Start: 2020-06-29

## 2020-06-30 ENCOUNTER — VIRTUAL VISIT (OUTPATIENT)
Dept: FAMILY MEDICINE | Facility: CLINIC | Age: 60
End: 2020-06-30
Payer: COMMERCIAL

## 2020-06-30 DIAGNOSIS — E61.1 IRON DEFICIENCY: ICD-10-CM

## 2020-06-30 DIAGNOSIS — J45.30 MILD PERSISTENT ASTHMA WITHOUT COMPLICATION: ICD-10-CM

## 2020-06-30 DIAGNOSIS — I10 ESSENTIAL HYPERTENSION: ICD-10-CM

## 2020-06-30 DIAGNOSIS — K59.00 CONSTIPATION, UNSPECIFIED CONSTIPATION TYPE: Primary | ICD-10-CM

## 2020-06-30 DIAGNOSIS — G60.9 IDIOPATHIC PERIPHERAL NEUROPATHY: ICD-10-CM

## 2020-06-30 DIAGNOSIS — E87.6 HYPOKALEMIA: ICD-10-CM

## 2020-06-30 DIAGNOSIS — G82.20 PARAPLEGIA (H): ICD-10-CM

## 2020-06-30 DIAGNOSIS — E78.5 HYPERLIPIDEMIA LDL GOAL <100: ICD-10-CM

## 2020-06-30 DIAGNOSIS — F33.1 MAJOR DEPRESSIVE DISORDER, RECURRENT EPISODE, MODERATE (H): ICD-10-CM

## 2020-06-30 DIAGNOSIS — I10 ESSENTIAL HYPERTENSION WITH GOAL BLOOD PRESSURE LESS THAN 140/90: ICD-10-CM

## 2020-06-30 DIAGNOSIS — G89.29 OTHER CHRONIC PAIN: ICD-10-CM

## 2020-06-30 DIAGNOSIS — R25.2 SPASTICITY: ICD-10-CM

## 2020-06-30 PROCEDURE — 99214 OFFICE O/P EST MOD 30 MIN: CPT | Mod: 95 | Performed by: FAMILY MEDICINE

## 2020-06-30 PROCEDURE — 96127 BRIEF EMOTIONAL/BEHAV ASSMT: CPT | Performed by: FAMILY MEDICINE

## 2020-06-30 RX ORDER — FERROUS SULFATE 325(65) MG
325 TABLET, DELAYED RELEASE (ENTERIC COATED) ORAL EVERY OTHER DAY
Qty: 45 TABLET | Refills: 1 | Status: CANCELLED | OUTPATIENT
Start: 2020-06-30

## 2020-06-30 RX ORDER — NAPROXEN 500 MG/1
TABLET ORAL
Qty: 90 TABLET | Refills: 1 | Status: SHIPPED | OUTPATIENT
Start: 2020-06-30 | End: 2022-07-13

## 2020-06-30 RX ORDER — HYDROCHLOROTHIAZIDE 25 MG/1
25 TABLET ORAL DAILY
Qty: 90 TABLET | Refills: 1 | Status: SHIPPED | OUTPATIENT
Start: 2020-06-30 | End: 2020-11-11

## 2020-06-30 RX ORDER — ATORVASTATIN CALCIUM 20 MG/1
20 TABLET, FILM COATED ORAL DAILY
Qty: 90 TABLET | Refills: 3 | Status: SHIPPED | OUTPATIENT
Start: 2020-06-30 | End: 2021-07-06

## 2020-06-30 RX ORDER — METOPROLOL TARTRATE 100 MG
TABLET ORAL
Qty: 180 TABLET | Refills: 2 | Status: SHIPPED | OUTPATIENT
Start: 2020-06-30 | End: 2021-03-21

## 2020-06-30 RX ORDER — ALBUTEROL SULFATE 90 UG/1
2 AEROSOL, METERED RESPIRATORY (INHALATION) EVERY 6 HOURS PRN
Qty: 1 INHALER | Refills: 10 | Status: CANCELLED | OUTPATIENT
Start: 2020-06-30

## 2020-06-30 RX ORDER — BACLOFEN 10 MG/1
10 TABLET ORAL
COMMUNITY
Start: 2020-03-26 | End: 2020-06-30

## 2020-06-30 RX ORDER — CITALOPRAM HYDROBROMIDE 20 MG/1
20 TABLET ORAL DAILY
Qty: 90 TABLET | Refills: 1 | Status: CANCELLED | OUTPATIENT
Start: 2020-06-30

## 2020-06-30 RX ORDER — BACLOFEN 10 MG/1
10 TABLET ORAL 2 TIMES DAILY
Qty: 180 TABLET | Refills: 1 | Status: SHIPPED | OUTPATIENT
Start: 2020-06-30 | End: 2020-09-28

## 2020-06-30 RX ORDER — AMOXICILLIN 250 MG
1 CAPSULE ORAL DAILY PRN
Qty: 90 TABLET | Refills: 1 | Status: SHIPPED | OUTPATIENT
Start: 2020-06-30

## 2020-06-30 RX ORDER — GABAPENTIN 300 MG/1
600 CAPSULE ORAL 3 TIMES DAILY
Qty: 540 CAPSULE | Refills: 3 | Status: SHIPPED | OUTPATIENT
Start: 2020-06-30 | End: 2021-09-22

## 2020-06-30 ASSESSMENT — PATIENT HEALTH QUESTIONNAIRE - PHQ9: SUM OF ALL RESPONSES TO PHQ QUESTIONS 1-9: 0

## 2020-06-30 NOTE — PROGRESS NOTES
"Elaina Valenzuela is a 59 year old male who is being evaluated via a billable telephone visit.      The patient has been notified of following:     \"This telephone visit will be conducted via a call between you and your physician/provider. We have found that certain health care needs can be provided without the need for a physical exam.  This service lets us provide the care you need with a short phone conversation.  If a prescription is necessary we can send it directly to your pharmacy.  If lab work is needed we can place an order for that and you can then stop by our lab to have the test done at a later time.    Telephone visits are billed at different rates depending on your insurance coverage. During this emergency period, for some insurers they may be billed the same as an in-person visit.  Please reach out to your insurance provider with any questions.    If during the course of the call the physician/provider feels a telephone visit is not appropriate, you will not be charged for this service.\"    Patient has given verbal consent for Telephone visit?  Yes    What phone number would you like to be contacted at? [phone]    How would you like to obtain your AVS? Mail a copy    Subjective     Elaina Valenzuela is a 59 year old male who presents via phone visit today for the following health issues:    HPI  Hypertension Follow-up      Do you check your blood pressure regularly outside of the clinic? Yes     Are you following a low salt diet? Yes    Are your blood pressures ever more than 140 on the top number (systolic) OR more   than 90 on the bottom number (diastolic), for example 140/90? Yes    Depression Followup    How are you doing with your depression since your last visit? Improved     Are you having other symptoms that might be associated with depression? No    Have you had a significant life event?  No     Are you feeling anxious or having panic attacks?   No    Do you have any concerns with your use " of alcohol or other drugs? No    Social History     Tobacco Use     Smoking status: Never Smoker     Smokeless tobacco: Never Used   Substance Use Topics     Alcohol use: Yes     Comment: rare     Drug use: No     PHQ 3/12/2018 7/23/2018 6/30/2020   PHQ-9 Total Score 17 17 0   Q9: Thoughts of better off dead/self-harm past 2 weeks Several days Nearly every day Not at all     JHONY-7 SCORE 11/9/2015 12/29/2016 10/10/2017   Total Score - - -   Total Score 12 10 11           Suicide Assessment Five-step Evaluation and Treatment (SAFE-T)      How many servings of fruits and vegetables do you eat daily?  2-3    On average, how many sweetened beverages do you drink each day (Examples: soda, juice, sweet tea, etc.  Do NOT count diet or artificially sweetened beverages)?   0    How many days per week do you exercise enough to make your heart beat faster? 3 or less    How many minutes a day do you exercise enough to make your heart beat faster? 9 or less    How many days per week do you miss taking your medication? 0             BP Readings from Last 3 Encounters:   12/03/19 138/82   11/07/19 (!) 151/94   11/04/19 (!) 139/91    Wt Readings from Last 3 Encounters:   12/03/19 79.8 kg (176 lb)   11/07/19 80 kg (176 lb 4.8 oz)   11/04/19 80 kg (176 lb 4.8 oz)                    Reviewed and updated as needed this visit by Provider  Tobacco  Allergies  Meds  Problems  Med Hx  Surg Hx  Fam Hx         Review of Systems   Constitutional, HEENT, cardiovascular, pulmonary, gi and gu systems are negative, except as otherwise noted.       Objective   Reported vitals:  There were no vitals taken for this visit.   healthy, alert and no distress  PSYCH: Alert and oriented times 3; coherent speech, normal   rate and volume, able to articulate logical thoughts, able   to abstract reason, no tangential thoughts, no hallucinations   or delusions  His affect is normal  RESP: No cough, no audible wheezing, able to talk in full  sentences  Remainder of exam unable to be completed due to telephone visits    Diagnostic Test Results:  Labs reviewed in Epic        Assessment/Plan:    ICD-10-CM    1. Constipation, unspecified constipation type  K59.00 senna-docusate (SENOKOT-S/PERICOLACE) 8.6-50 MG tablet   2. Mild persistent asthma without complication  J45.30    3. Major depressive disorder, recurrent episode, moderate (H)  F33.1    4. Iron deficiency  E61.1 Comprehensive metabolic panel (BMP + Alb, Alk Phos, ALT, AST, Total. Bili, TP)     **Hemoglobin FUTURE anytime     Hematocrit     **Iron and iron binding capacity FUTURE anytime     Ferritin     CANCELED: Comprehensive metabolic panel (BMP + Alb, Alk Phos, ALT, AST, Total. Bili, TP)     CANCELED: Hemoglobin     CANCELED: Hematocrit     CANCELED: Iron and iron binding capacity     CANCELED: Ferritin   5. Essential hypertension with goal blood pressure less than 140/90  I10 metoprolol tartrate (LOPRESSOR) 100 MG tablet     atorvastatin (LIPITOR) 20 MG tablet   6. Essential hypertension  I10 hydrochlorothiazide (HYDRODIURIL) 25 MG tablet   7. Hyperlipidemia LDL goal <100  E78.5 atorvastatin (LIPITOR) 20 MG tablet   8. Other chronic pain  G89.29 naproxen (NAPROSYN) 500 MG tablet   9. Paraplegia (H)  G82.20    10. Idiopathic peripheral neuropathy  G60.9 gabapentin (NEURONTIN) 300 MG capsule   11. Spasticity  R25.2 baclofen (LIORESAL) 10 MG tablet   12. Hypokalemia  E87.6 CANCELED: Comprehensive metabolic panel (BMP + Alb, Alk Phos, ALT, AST, Total. Bili, TP)         Return in about 3 months (around 9/30/2020) for For Re-Assessment.      Phone call duration:  21 minutes    Rosas Escalante MD

## 2020-07-02 NOTE — TELEPHONE ENCOUNTER
Reason for call:  Other   Patient called regarding (reason for call): regarding a letter he received about making an appointment  Additional comments: Patient would like to know what medication is for that the letter stated he needed to come in for? Please call.     Phone number to reach patient:  Home number on file [phone] (home)    Best Time:  any    Can we leave a detailed message on this number?  NO    Travel screening: Not Applicable

## 2020-07-03 NOTE — TELEPHONE ENCOUNTER
Called and left patient VM that visit is need for his medication on senna-docusate (SENOKOT-S/PERICOLACE) 8.6-50 MG tablet   Jackie Herrera CMA on 7/3/2020 at 8:02 AM

## 2020-09-15 ENCOUNTER — NURSE TRIAGE (OUTPATIENT)
Dept: FAMILY MEDICINE | Facility: CLINIC | Age: 60
End: 2020-09-15

## 2020-09-15 ENCOUNTER — VIRTUAL VISIT (OUTPATIENT)
Dept: FAMILY MEDICINE | Facility: CLINIC | Age: 60
End: 2020-09-15
Payer: COMMERCIAL

## 2020-09-15 DIAGNOSIS — R82.0 MILKY URINE: Primary | ICD-10-CM

## 2020-09-15 PROCEDURE — 99213 OFFICE O/P EST LOW 20 MIN: CPT | Mod: 95 | Performed by: PHYSICIAN ASSISTANT

## 2020-09-15 NOTE — TELEPHONE ENCOUNTER
Spoke with pt. Symptoms started 2 weeks ago. Has had the chills, feels cold. Has not measured temp. No frequency. No pain. No hematuria. Urine looks milky white in the morning. Pt requesting orders for UA. He also requested that we mail specimen container to him as he is in a w/c and cannot come into clinic. Advised that he should do an in clinic or virtual appt. Appt scheduled. Advised that we do not mail specimen containers. Pt would need to have someone pick this up for him.    Chantal Ortiz RN  [facility]

## 2020-09-15 NOTE — TELEPHONE ENCOUNTER
Reason for Call: Request for an order or referral:    Order or referral being requested: Urinalysis    Date needed: as soon as possible    Has the patient been seen by the PCP for this problem? NO    Additional comments: Patient states his urine is coming out cloudy white.    Phone number Patient can be reached at:  Home number on file [phone] (home)    Best Time:  any    Can we leave a detailed message on this number?  YES    Call taken on 9/15/2020 at 12:46 PM by Beth Braun

## 2020-09-15 NOTE — PROGRESS NOTES
"Elaina Valenzuela is a 59 year old male who is being evaluated via a billable telephone visit.      The patient has been notified of following:     \"This telephone visit will be conducted via a call between you and your physician/provider. We have found that certain health care needs can be provided without the need for a physical exam.  This service lets us provide the care you need with a short phone conversation.  If a prescription is necessary we can send it directly to your pharmacy.  If lab work is needed we can place an order for that and you can then stop by our lab to have the test done at a later time.    Telephone visits are billed at different rates depending on your insurance coverage. During this emergency period, for some insurers they may be billed the same as an in-person visit.  Please reach out to your insurance provider with any questions.    If during the course of the call the physician/provider feels a telephone visit is not appropriate, you will not be charged for this service.\"    Patient has given verbal consent for Telephone visit?  Yes    What phone number would you like to be contacted at? [phone]    How would you like to obtain your AVS? Mail a copy    Subjective     Elaina Valenzuela is a 59 year old male who presents via phone visit today for the following health issues:    HPI    Concern - urine color is like milk color x 2 weeks and only in the monring  Onset: 2 weeks   Description: urine color is like milk color x 2 weeks and only in the monring    Intensity: n/a  Progression of Symptoms:  same  Accompanying Signs & Symptoms: sometime will have lower right side of abdomen  Previous history of similar problem: none  Precipitating factors:        Worsened by: n/a  Alleviating factors:        Improved by: n/a  Therapies tried and outcome: None    PMhx of Parapalegia.  Patient does note that it is semen colored.  Amenable to urology follow up.    Review of Systems   Constitutional, " "HEENT, cardiovascular, pulmonary, gi and gu systems are negative, except as otherwise noted.       Objective          Vitals:  No vitals were obtained today due to virtual visit.    healthy, alert and no distress  PSYCH: Alert and oriented times 3; coherent speech, normal   rate and volume, able to articulate logical thoughts, able   to abstract reason, no tangential thoughts, no hallucinations   or delusions  His affect is normal  RESP: No cough, no audible wheezing, able to talk in full sentences  Remainder of exam unable to be completed due to telephone visits            Assessment/Plan:    Assessment & Plan     Elaina was seen today for urinary problem.    Diagnoses and all orders for this visit:    Milky urine  -     UROLOGY ADULT REFERRAL; Future         BMI:   Estimated body mass index is 29.29 kg/m  as calculated from the following:    Height as of 11/7/19: 1.651 m (5' 5\").    Weight as of 12/3/19: 79.8 kg (176 lb).       Return per Urology recommendations.    Abad Varela PA-C  [facility]    Phone call duration:  15 minutes              "

## 2020-09-15 NOTE — TELEPHONE ENCOUNTER
Additional Information    Negative: Shock suspected (e.g., cold/pale/clammy skin, too weak to stand, low BP, rapid pulse)    Negative: Sounds like a life-threatening emergency to the triager    Negative: Unable to urinate (or only a few drops) and bladder feels very full    Negative: Swollen scrotum    Negative: Pain in scrotum or testicle that persists > 1 hour    Negative: Fever > 100.5 F (38.1 C)    Negative: Vomiting    Negative: Patient sounds very sick or weak to the triager    Negative: Severe pain with urination    Negative: Side (flank) or lower back pain present    All other males with painful urination, or patient wants to be seen     No pain with urination. Pt has white, cloudy urine in the mornings    Negative: Taking treatment > 3 days for STD (e.g., penile discharge from gonorrhea, chlamydia) and painful urination not improved    Negative: Taking antibiotic > 3 days for UTI and painful urination not improved    Negative: Taking antibiotic > 24 hours for UTI and fever persists    Protocols used: URINATION PAIN - MALE-A-OH

## 2020-10-16 ENCOUNTER — TELEPHONE (OUTPATIENT)
Dept: FAMILY MEDICINE | Facility: CLINIC | Age: 60
End: 2020-10-16

## 2020-10-16 DIAGNOSIS — J45.20 MILD INTERMITTENT ASTHMA WITHOUT COMPLICATION: ICD-10-CM

## 2020-10-16 DIAGNOSIS — F32.1 MODERATE MAJOR DEPRESSION (H): ICD-10-CM

## 2020-10-16 DIAGNOSIS — R82.0 MILKY URINE: Primary | ICD-10-CM

## 2020-10-16 DIAGNOSIS — D32.1 SPINAL MENINGIOMA (H): ICD-10-CM

## 2020-10-16 DIAGNOSIS — N31.9 NEUROGENIC BLADDER: ICD-10-CM

## 2020-10-16 DIAGNOSIS — G82.20 PARAPLEGIA (H): ICD-10-CM

## 2020-10-16 NOTE — TELEPHONE ENCOUNTER
Reason for call:  Symptom   Symptom or request: Confusion and disorientated     Duration (how long have symptoms been present): unknown  Have you been treated for this before? No    Additional comments: Caller states patient called them and seemed very confused and was talking about having a doctor come and  his urine and that he does not go to the doctors office. Caller advise patient that he should call 911 because he seemed disorientated and asked that an RN reach out to the patient. Writer will have RN call patient.    Phone number to reach patient:  Home number on file [phone] (home)    Best Time:  asap    Can we leave a detailed message on this number?  NO    Travel screening: Not Applicable

## 2020-10-16 NOTE — TELEPHONE ENCOUNTER
Called patient to notify that orders were placed. He states his spouse will  sterile specimen container from [place] lab for urine UA/UC. He is aware that referrals were placed for home care and urology.

## 2020-10-16 NOTE — TELEPHONE ENCOUNTER
"Spoke with patient  He is alert and oriented x 4  No signs or symptoms of confusion    Per patient, he had called the clinic to request to speak with his doctor regarding getting a urine test but was told to try oncare  He said that \"they\" helped him call oncare but it was not the right place to call to request to drop off his urine and he was perceived as confused     Per patient, he had a virtual visit last month (with Abad Varela PA-C) for milky urine in the AM  He was referred to urology (but cancelled his appointment)  He stated that they wanted to see him but he has paraplegia and is homebound and stated that he cannot get out of the house  He lives with his wife but she goes to work   Before work, she leaves food and water for him on his table and he is left by himself until his wife comes home    Patient wondering if he can at least get an order from primary care for a UA/UC to start with  He will ask someone to drop off a urine sample of him  He is also interested in home care referral again    Gene Walters RN    "

## 2020-10-18 ENCOUNTER — NURSE TRIAGE (OUTPATIENT)
Dept: NURSING | Facility: CLINIC | Age: 60
End: 2020-10-18

## 2020-10-18 NOTE — TELEPHONE ENCOUNTER
[facility] needs orders for a .    Verbal OK given per standing orders.    Chantal Fitzgerald RN  [place] Nurse Advisor  8:26 AM  10/18/2020

## 2020-10-19 ENCOUNTER — DOCUMENTATION ONLY (OUTPATIENT)
Dept: CARE COORDINATION | Facility: CLINIC | Age: 60
End: 2020-10-19

## 2020-10-19 NOTE — PROGRESS NOTES
Dear Dr. Flores,      Due to a shortage of available home care nurses, the initial evaluation for Elaina Valenzuela; MRN [id number]  Will be completed by our Physical Therapy department.        Sincerely [facility]  Luz Elena Shultz RN  [phone]

## 2020-10-20 ENCOUNTER — TELEPHONE (OUTPATIENT)
Dept: FAMILY MEDICINE | Facility: CLINIC | Age: 60
End: 2020-10-20

## 2020-10-20 NOTE — TELEPHONE ENCOUNTER
Ok for orders requested.    Chantal Ortiz RN for Dr. Sandra JAIN [facility]    Dr. Flores,  Please see below regarding med rec issues.    Chantal Ortiz RN  [facility]

## 2020-10-20 NOTE — TELEPHONE ENCOUNTER
[facility] utilizes an encounter to take the place of a direct phone call to your office. Please take a moment to review the below request. Please reply or route message to author of this encounter.  Message will act as a verbal OK of orders requested below. Thank you.    ORDER  Pt seen for home PT evaluation visit. Plan to continue with following services/  1. PT 1w3 for there ex/hep instruction/transfer training.  2. OT to eval and treat for adl safety/adaptive equipment.  3. RN to eval and treat for med management  4. SW to assist with virtual visit, community resources.  MED REC ISSUES NOTED/  Med rec completed with todays visit.  Noted follow medications are NOT being taking as listed in epic med list:  Amitriptylin, ferrous sulfate, hydrochlorothiazied, methocarbamol and tamulosin.  Did note bp was slightly elevated at 138/90 today. Is also taking baclofen which is not on current epic list. Please advise if patient ok with not taking the above meds. Thank you.    Art Phelan PT  [phone]

## 2020-10-21 ENCOUNTER — DOCUMENTATION ONLY (OUTPATIENT)
Dept: LAB | Facility: CLINIC | Age: 60
End: 2020-10-21

## 2020-10-21 NOTE — PROGRESS NOTES
Patient's wife dropped off urine specimen for patient.  Sample was collected at 05:07 today, delivered to lab at 15:10.  Sample is too old for testing.  New collection kit given, and discussed proper delivery time frame for specimen.  Wife expressed understanding of collection/delivery instructions. Natalee Love CMA([facility])

## 2020-10-24 ENCOUNTER — TELEPHONE (OUTPATIENT)
Dept: FAMILY MEDICINE | Facility: CLINIC | Age: 60
End: 2020-10-24

## 2020-10-24 DIAGNOSIS — R82.0 MILKY URINE: ICD-10-CM

## 2020-10-24 LAB
ALBUMIN UR-MCNC: 30 MG/DL
APPEARANCE UR: ABNORMAL
BACTERIA #/AREA URNS HPF: ABNORMAL /HPF
BILIRUB UR QL STRIP: NEGATIVE
COLOR UR AUTO: YELLOW
GLUCOSE UR STRIP-MCNC: NEGATIVE MG/DL
HGB UR QL STRIP: ABNORMAL
KETONES UR STRIP-MCNC: NEGATIVE MG/DL
LEUKOCYTE ESTERASE UR QL STRIP: ABNORMAL
NITRATE UR QL: POSITIVE
NON-SQ EPI CELLS #/AREA URNS LPF: ABNORMAL /LPF
PH UR STRIP: 6 PH (ref 5–7)
RBC #/AREA URNS AUTO: ABNORMAL /HPF
SOURCE: ABNORMAL
SP GR UR STRIP: 1.01 (ref 1–1.03)
UROBILINOGEN UR STRIP-ACNC: 0.2 EU/DL (ref 0.2–1)
WBC #/AREA URNS AUTO: >100 /HPF

## 2020-10-24 PROCEDURE — 87088 URINE BACTERIA CULTURE: CPT | Performed by: FAMILY MEDICINE

## 2020-10-24 PROCEDURE — 87186 SC STD MICRODIL/AGAR DIL: CPT | Performed by: FAMILY MEDICINE

## 2020-10-24 PROCEDURE — 87086 URINE CULTURE/COLONY COUNT: CPT | Performed by: FAMILY MEDICINE

## 2020-10-24 PROCEDURE — 81001 URINALYSIS AUTO W/SCOPE: CPT | Performed by: FAMILY MEDICINE

## 2020-10-24 NOTE — TELEPHONE ENCOUNTER
[facility] utilizes an encounter to take the place of a direct phone call to your office. Please take a moment to review the below request. Please reply or route message to author of this encounter.  Message will act as a verbal OK of orders requested below. Thank you.    ORDER  Requesting SN 1w3 for medication education/mgmt, disease education, nutrition/hydration teaching, PUP, and 2PRN for change in condition, injury r/t fall, medication changes.    Client is inquiring if HC can admin flu vaccine, please advise if you would like to order in home flu vaccine for patient, will need clarification on vaccine indicated.      Client also is not drinking water due to frequent nighttime urination, would he be appropriate for a condom catheter for nighttime use, if so please order from Evi for client.      Thank you,  MARY Fermin  [phone]  [email or web address]

## 2020-10-26 ENCOUNTER — VIRTUAL VISIT (OUTPATIENT)
Dept: FAMILY MEDICINE | Facility: CLINIC | Age: 60
End: 2020-10-26
Payer: COMMERCIAL

## 2020-10-26 ENCOUNTER — TELEPHONE (OUTPATIENT)
Dept: FAMILY MEDICINE | Facility: CLINIC | Age: 60
End: 2020-10-26

## 2020-10-26 DIAGNOSIS — G82.20 PARAPLEGIA (H): ICD-10-CM

## 2020-10-26 DIAGNOSIS — N30.01 ACUTE CYSTITIS WITH HEMATURIA: Primary | ICD-10-CM

## 2020-10-26 DIAGNOSIS — K59.00 CONSTIPATION, UNSPECIFIED CONSTIPATION TYPE: Primary | ICD-10-CM

## 2020-10-26 LAB
BACTERIA SPEC CULT: ABNORMAL
BACTERIA SPEC CULT: ABNORMAL
Lab: ABNORMAL
SPECIMEN SOURCE: ABNORMAL

## 2020-10-26 PROCEDURE — 99214 OFFICE O/P EST MOD 30 MIN: CPT | Mod: 95 | Performed by: FAMILY MEDICINE

## 2020-10-26 RX ORDER — BACLOFEN 10 MG/1
10 TABLET ORAL 2 TIMES DAILY
COMMUNITY
Start: 2020-10-06 | End: 2021-01-14

## 2020-10-26 RX ORDER — CIPROFLOXACIN 500 MG/1
500 TABLET, FILM COATED ORAL 2 TIMES DAILY
Qty: 10 TABLET | Refills: 0 | Status: SHIPPED | OUTPATIENT
Start: 2020-10-26 | End: 2020-11-05

## 2020-10-26 RX ORDER — BISACODYL 10 MG
10 SUPPOSITORY, RECTAL RECTAL DAILY PRN
Qty: 100 SUPPOSITORY | Refills: 3 | Status: SHIPPED | OUTPATIENT
Start: 2020-10-26

## 2020-10-26 NOTE — TELEPHONE ENCOUNTER
[facility] now requests orders and shares plan of care/discharge summaries for some patients through [employer].  Please REPLY TO THIS MESSAGE OR ROUTE BACK TO THE AUTHOR in order to give authorization for orders when needed.  This is considered a verbal order, you will still receive a faxed copy of orders for signature.  Thank you for your assistance in improving collaboration for our patients.    ORDER    SW revisit 1m1 to assist with community resources and in home supports for respite care.     Colleen Delarosa [facility]  [phone]

## 2020-10-26 NOTE — PROGRESS NOTES
"Elaina Valenzuela is a 59 year old male who is being evaluated via a billable video visit.      The patient has been notified of following:     \"This video visit will be conducted via a call between you and your physician/provider. We have found that certain health care needs can be provided without the need for an in-person physical exam.  This service lets us provide the care you need with a video conversation.  If a prescription is necessary we can send it directly to your pharmacy.  If lab work is needed we can place an order for that and you can then stop by our lab to have the test done at a later time.    Video visits are billed at different rates depending on your insurance coverage.  Please reach out to your insurance provider with any questions.    If during the course of the call the physician/provider feels a video visit is not appropriate, you will not be charged for this service.\"    Patient has given verbal consent for Video visit? Yes  How would you like to obtain your AVS? Mail a copy  If you are dropped from the video visit, the video invite should be resent to: Text to cell phone: [phone]  Will anyone else be joining your video visit? No    Subjective     Elaina Valenzuela is a 59 year old male who presents today via video visit for the following health issues:    HPI     Chief Complaint   Patient presents with     Patient Request     Face to face visit for home care     Colleen (Home care ) will be in during video visit.  Request for home care orders  Admission reason was neurogenic bladder and to start PT/OT to help with paraplegia  Patient uses wheelchair to ambulate  Tried assist to stand, for 2-3 weeks - [employer]  PT assessment for this    Chronic constipation, requests suppository        Review of Systems   Constitutional, HEENT, cardiovascular, pulmonary, gi and gu systems are negative, except as otherwise noted.      Objective           Vitals:  No " vitals were obtained today due to virtual visit.    Physical Exam     GENERAL: Healthy, alert and no distress  EYES: Eyes grossly normal to inspection.  No discharge or erythema, or obvious scleral/conjunctival abnormalities.  RESP: No audible wheeze, cough, or visible cyanosis.  No visible retractions or increased work of breathing.    SKIN: Visible skin clear. No significant rash, abnormal pigmentation or lesions.  NEURO: Cranial nerves grossly intact.  Mentation and speech appropriate for age.  PSYCH: Mentation appears normal, affect normal/bright, judgement and insight intact, normal speech and appearance well-groomed.              Assessment & Plan       ICD-10-CM    1. Constipation, unspecified constipation type  K59.00 bisacodyl (DULCOLAX) 10 MG suppository   2. Paraplegia (H)  G82.20 bisacodyl (DULCOLAX) 10 MG suppository     Home care orders approved     Return in about 3 months (around 1/26/2021) for For Re-Assessment.    Rosas Escalante MD  [facility]      Video-Visit Details    Type of service:  Video Visit    Start: 10/26/2020 09:47 am   Stop: 10/26/2020 09:57 am    Originating Location (pt. Location): Home    Distant Location (provider location):  [facility]     Platform used for Video Visit: [employer]

## 2020-10-29 DIAGNOSIS — Z53.9 DIAGNOSIS NOT YET DEFINED: Primary | ICD-10-CM

## 2020-10-29 PROCEDURE — G0180 MD CERTIFICATION HHA PATIENT: HCPCS | Performed by: FAMILY MEDICINE

## 2020-11-03 DIAGNOSIS — N30.01 ACUTE CYSTITIS WITH HEMATURIA: ICD-10-CM

## 2020-11-03 NOTE — TELEPHONE ENCOUNTER
Patient called RN hotline stating that he is requesting a refill of Cipro for another 5 days. State that something does not seem quite right, like symptoms are not completely cleared.    Symptoms: constipation-no  fever, no  urinary frequency- yes  burning with urination- no  blood in urine- no  nausea/vomiting- no  cloudy urine- no  foul odor- no  abdominal/back/flank pain- no

## 2020-11-05 RX ORDER — CIPROFLOXACIN 500 MG/1
500 TABLET, FILM COATED ORAL 2 TIMES DAILY
Qty: 10 TABLET | Refills: 0 | Status: SHIPPED | OUTPATIENT
Start: 2020-11-05

## 2020-11-11 ENCOUNTER — TELEPHONE (OUTPATIENT)
Dept: FAMILY MEDICINE | Facility: CLINIC | Age: 60
End: 2020-11-11

## 2020-11-11 DIAGNOSIS — R35.0 URINARY FREQUENCY: Primary | ICD-10-CM

## 2020-11-11 NOTE — TELEPHONE ENCOUNTER
DME (Durable Medical Equipment) Orders and Documentation  Orders Placed This Encounter   Procedures     Miscellaneous DME Order      The patient was assessed and it was determined the patient is in need of the following listed DME Supplies/Equipment. Please complete supporting documentation below to demonstrate medical necessity.      DME All Other Item(s) Documentation    List reason for need and supporting documentation for medical necessity below for each DME item.     1. Urinary frequency

## 2020-11-11 NOTE — TELEPHONE ENCOUNTER
[facility] utilizes an encounter to take the place of a direct phone call to your office. Please take a moment to review the below request. Please reply or route message to author of this encounter.  Message will act as a verbal OK of orders requested below. Thank you.    Client is being discharged from Home Care services today with goals met.      Mr Echavarria is requesting an order for condom catheters and drainage bags be sent to [facility].    Client reports he has to get up frequently during the night to void, due to his limited mobility this is difficult for him to manage during the nighttime hours.  Due to frequent night time voiding needs client is not drinking water during the day and has had recurrent UTI symptoms.  States today he is not having UTI symptoms, but would like to try a condom catheter to see if this helps him during the night so he can adequately hydrate during the day time hours.    Please call client directly with any questions, as he is now discharged from homecare services.  Thank you,  MARY Fermin  [phone]  [email or web address]

## 2020-11-16 ENCOUNTER — MEDICAL CORRESPONDENCE (OUTPATIENT)
Dept: HEALTH INFORMATION MANAGEMENT | Facility: CLINIC | Age: 60
End: 2020-11-16

## 2021-01-14 DIAGNOSIS — G82.20 PARAPLEGIA (H): Primary | ICD-10-CM

## 2021-01-14 NOTE — TELEPHONE ENCOUNTER
baclofen (LIORESAL) 10 MG tablet      Last Written Prescription Date:  10/06/2020  Last Fill Quantity: ,   # refills:   Last Office Visit:   Future Office visit:       Routing refill request to provider for review/approval because:  Drug not on the [facility], [facility] or [facility] refill protocol or controlled substance  Medication is reported/historical  An Herrera, CMA

## 2021-01-15 RX ORDER — BACLOFEN 10 MG/1
10 TABLET ORAL 2 TIMES DAILY
Qty: 180 TABLET | Refills: 1 | Status: SHIPPED | OUTPATIENT
Start: 2021-01-15 | End: 2021-07-06

## 2021-03-18 DIAGNOSIS — I10 ESSENTIAL HYPERTENSION WITH GOAL BLOOD PRESSURE LESS THAN 140/90: ICD-10-CM

## 2021-03-21 RX ORDER — METOPROLOL TARTRATE 100 MG
TABLET ORAL
Qty: 180 TABLET | Refills: 0 | Status: SHIPPED | OUTPATIENT
Start: 2021-03-21 | End: 2021-06-07

## 2021-04-07 ENCOUNTER — TELEPHONE (OUTPATIENT)
Dept: FAMILY MEDICINE | Facility: CLINIC | Age: 61
End: 2021-04-07

## 2021-04-07 NOTE — TELEPHONE ENCOUNTER
Spoke with patient. He was difficult to understand due to his accent. He is wondering if Dr. Flores can order a piece of equipment for him. He states that it is a machine that helps him to stand to make his knees stronger. He states that he received one from a company to try for two weeks. He feels like his legs are a little bit stronger. He states that the nurse that came to his house (home care) would know what it is. He was not able to provide any details to the RN on the name of the machine that he would like an order for or the company that he would like to get it from.   Pt then asked if the nurse can come out to see him again (home care?).  Recommended appointment with PCP to discuss request. Pt schedule appointment for 04/12.

## 2021-04-12 ENCOUNTER — VIRTUAL VISIT (OUTPATIENT)
Dept: FAMILY MEDICINE | Facility: CLINIC | Age: 61
End: 2021-04-12
Payer: COMMERCIAL

## 2021-04-12 DIAGNOSIS — F33.1 MAJOR DEPRESSIVE DISORDER, RECURRENT EPISODE, MODERATE (H): ICD-10-CM

## 2021-04-12 DIAGNOSIS — G82.20 PARAPLEGIA (H): Primary | ICD-10-CM

## 2021-04-12 DIAGNOSIS — D32.1 SPINAL MENINGIOMA (H): ICD-10-CM

## 2021-04-12 PROCEDURE — 99213 OFFICE O/P EST LOW 20 MIN: CPT | Mod: 95 | Performed by: FAMILY MEDICINE

## 2021-04-12 NOTE — PROGRESS NOTES
Elaina is a 60 year old who is being evaluated via a billable telephone visit.      What phone number would you like to be contacted at? [phone]  How would you like to obtain your AVS? Mail a copy    Assessment & Plan       ICD-10-CM    1. Paraplegia (H)  G82.20 HOME CARE NURSING REFERRAL   2. Major depressive disorder, recurrent episode, moderate (H)  F33.1    3. Spinal meningioma (H)  D32.1          Review of external notes as documented elsewhere in note        Follow-up with home nursing/PT/OT assessment    Rosas Escalante MD  [facility]    Lashonda Delaney is a 60 year old who presents for the following health issues     HPI     Chief Complaint   Patient presents with     RECHECK     Difficulty standing and weakness     Patient presents to discuss getting a prescription for stander equipment.  Patient has a history of paraplegia        Review of Systems   Constitutional, HEENT, cardiovascular, pulmonary, gi and gu systems are negative, except as otherwise noted.      Objective           Vitals:  No vitals were obtained today due to virtual visit.    Physical Exam   healthy, alert and no distress  PSYCH: Alert and oriented times 3; coherent speech, normal   rate and volume, able to articulate logical thoughts, able   to abstract reason, no tangential thoughts, no hallucinations   or delusions  His affect is normal  RESP: No cough, no audible wheezing, able to talk in full sentences  Remainder of exam unable to be completed due to telephone visits                Phone call duration: 10 minutes

## 2021-04-15 ENCOUNTER — TELEPHONE (OUTPATIENT)
Dept: FAMILY MEDICINE | Facility: CLINIC | Age: 61
End: 2021-04-15

## 2021-04-15 NOTE — TELEPHONE ENCOUNTER
[facility] now requests orders and shares plan of care/discharge summaries for some patients through [employer].  Please REPLY TO THIS MESSAGE OR ROUTE BACK TO THE AUTHOR in order to give authorization for orders when needed.  This is considered a verbal order, you will still receive a faxed copy of orders for signature.  Thank you for your assistance in improving collaboration for our patients.    **Pt was seen by home care clinician and not admitted to home care services at this time. Pt's primary concern is to obtain a Standing Frame which he has trialed in the past with [employer]. Pt's information was sent on to [employer] as well as pt's desire to address wheelchair repairs. No skilled service from home care determined at this time.     As part of the  home care visit a medication reconciliation was still completed and the following medication alerts require MD notification. PLEASE ADVISE of ANY MEDICATION CHANGES NEEDED.  --Albuterol Sulfate interacts with amitriptyline  --Bisacodyl and Senna; multiple medications in the stimulant laxative/laxative class.    Thank you,  Violet Salas, PT  [email or web address]  [phone]

## 2021-05-05 ENCOUNTER — TELEPHONE (OUTPATIENT)
Dept: FAMILY MEDICINE | Facility: CLINIC | Age: 61
End: 2021-05-05

## 2021-05-05 DIAGNOSIS — F33.1 MAJOR DEPRESSIVE DISORDER, RECURRENT EPISODE, MODERATE (H): Primary | ICD-10-CM

## 2021-05-05 DIAGNOSIS — G82.20 PARAPLEGIA (H): ICD-10-CM

## 2021-05-07 NOTE — TELEPHONE ENCOUNTER
Patient notified referral has been completed. He had no additional questions.     Josie Gilliland RN

## 2021-05-26 NOTE — TELEPHONE ENCOUNTER
Patient called the clinic.  He reports that he just spoke with Yonatan Somers and was informed that the referral has not been received at this time.    Called Yonatan Whitmore [phone] and spoke with the .  She confirmed that the referral has not been received.    Team:  Please fax the referral order to [phone] as soon as possible.  Please call [phone] to confirm that the referral was received.

## 2021-06-02 ENCOUNTER — RECORDS - HEALTHEAST (OUTPATIENT)
Dept: ADMINISTRATIVE | Facility: CLINIC | Age: 61
End: 2021-06-02

## 2021-06-02 DIAGNOSIS — I10 ESSENTIAL HYPERTENSION WITH GOAL BLOOD PRESSURE LESS THAN 140/90: ICD-10-CM

## 2021-06-05 NOTE — TELEPHONE ENCOUNTER
Routing refill request to provider for review/approval because:  Labs not current:   BP Readings from Last 3 Encounters:   12/03/19 138/82   11/07/19 (!) 151/94   11/04/19 (!) 139/91

## 2021-06-07 RX ORDER — METOPROLOL TARTRATE 100 MG
TABLET ORAL
Qty: 180 TABLET | Refills: 0 | Status: SHIPPED | OUTPATIENT
Start: 2021-06-07 | End: 2021-09-22

## 2021-06-08 ENCOUNTER — NURSE TRIAGE (OUTPATIENT)
Dept: NURSING | Facility: CLINIC | Age: 61
End: 2021-06-08

## 2021-06-08 NOTE — TELEPHONE ENCOUNTER
Pt calling with questions about the Covid vaccine - Can he receive the vaccine if he has thyroid problems and hypertension.  Pt advised that, yes, he can receive the vaccine with these chronic issues.  Pt instructed to call back if further questions.  Anaya Alonzo RN 06/08/21 1:42 PM  [facility] Nurse Advisor      Reason for Disposition    COVID-19 vaccine, Frequently Asked Questions (FAQs)    Protocols used: CORONAVIRUS (COVID-19) VACCINE QUESTIONS AND [id number]-Z-NR 3.25

## 2021-07-06 ENCOUNTER — OFFICE VISIT (OUTPATIENT)
Dept: FAMILY MEDICINE | Facility: CLINIC | Age: 61
End: 2021-07-06
Payer: COMMERCIAL

## 2021-07-06 VITALS
WEIGHT: 229.5 LBS | DIASTOLIC BLOOD PRESSURE: 84 MMHG | HEART RATE: 71 BPM | BODY MASS INDEX: 38.19 KG/M2 | SYSTOLIC BLOOD PRESSURE: 140 MMHG | TEMPERATURE: 98.4 F | OXYGEN SATURATION: 95 %

## 2021-07-06 DIAGNOSIS — I10 ESSENTIAL HYPERTENSION WITH GOAL BLOOD PRESSURE LESS THAN 140/90: ICD-10-CM

## 2021-07-06 DIAGNOSIS — L98.9 SKIN LESION: ICD-10-CM

## 2021-07-06 DIAGNOSIS — G60.9 IDIOPATHIC PERIPHERAL NEUROPATHY: ICD-10-CM

## 2021-07-06 DIAGNOSIS — L20.9 ATOPIC DERMATITIS, UNSPECIFIED TYPE: Primary | ICD-10-CM

## 2021-07-06 DIAGNOSIS — Z13.6 ENCOUNTER FOR LIPID SCREENING FOR CARDIOVASCULAR DISEASE: ICD-10-CM

## 2021-07-06 DIAGNOSIS — G82.20 PARAPLEGIA (H): ICD-10-CM

## 2021-07-06 DIAGNOSIS — E66.01 MORBID OBESITY (H): ICD-10-CM

## 2021-07-06 DIAGNOSIS — E78.5 HYPERLIPIDEMIA LDL GOAL <100: ICD-10-CM

## 2021-07-06 DIAGNOSIS — Z12.11 SPECIAL SCREENING FOR MALIGNANT NEOPLASMS, COLON: ICD-10-CM

## 2021-07-06 DIAGNOSIS — Z13.220 ENCOUNTER FOR LIPID SCREENING FOR CARDIOVASCULAR DISEASE: ICD-10-CM

## 2021-07-06 LAB
ALBUMIN SERPL-MCNC: 3.8 G/DL (ref 3.4–5)
ALP SERPL-CCNC: 96 U/L (ref 40–150)
ALT SERPL W P-5'-P-CCNC: 37 U/L (ref 0–70)
ANION GAP SERPL CALCULATED.3IONS-SCNC: 1 MMOL/L (ref 3–14)
AST SERPL W P-5'-P-CCNC: 29 U/L (ref 0–45)
BILIRUB SERPL-MCNC: 0.8 MG/DL (ref 0.2–1.3)
BUN SERPL-MCNC: 15 MG/DL (ref 7–30)
CALCIUM SERPL-MCNC: 9.4 MG/DL (ref 8.5–10.1)
CHLORIDE SERPL-SCNC: 105 MMOL/L (ref 94–109)
CHOLEST SERPL-MCNC: 146 MG/DL
CO2 SERPL-SCNC: 33 MMOL/L (ref 20–32)
CREAT SERPL-MCNC: 1.1 MG/DL (ref 0.66–1.25)
GFR SERPL CREATININE-BSD FRML MDRD: 73 ML/MIN/{1.73_M2}
GLUCOSE SERPL-MCNC: 94 MG/DL (ref 70–99)
HDLC SERPL-MCNC: 37 MG/DL
LDLC SERPL CALC-MCNC: 47 MG/DL
NONHDLC SERPL-MCNC: 109 MG/DL
POTASSIUM SERPL-SCNC: 4.8 MMOL/L (ref 3.4–5.3)
PROT SERPL-MCNC: 8.6 G/DL (ref 6.8–8.8)
SODIUM SERPL-SCNC: 139 MMOL/L (ref 133–144)
TRIGL SERPL-MCNC: 310 MG/DL

## 2021-07-06 PROCEDURE — 80053 COMPREHEN METABOLIC PANEL: CPT | Performed by: FAMILY MEDICINE

## 2021-07-06 PROCEDURE — 36415 COLL VENOUS BLD VENIPUNCTURE: CPT | Performed by: FAMILY MEDICINE

## 2021-07-06 PROCEDURE — 99214 OFFICE O/P EST MOD 30 MIN: CPT | Performed by: FAMILY MEDICINE

## 2021-07-06 PROCEDURE — 80061 LIPID PANEL: CPT | Performed by: FAMILY MEDICINE

## 2021-07-06 RX ORDER — METOPROLOL TARTRATE 100 MG
TABLET ORAL
Qty: 180 TABLET | Refills: 0 | Status: CANCELLED | OUTPATIENT
Start: 2021-07-06

## 2021-07-06 RX ORDER — GABAPENTIN 300 MG/1
600 CAPSULE ORAL 3 TIMES DAILY
Qty: 540 CAPSULE | Refills: 3 | Status: CANCELLED | OUTPATIENT
Start: 2021-07-06

## 2021-07-06 RX ORDER — ATORVASTATIN CALCIUM 20 MG/1
20 TABLET, FILM COATED ORAL DAILY
Qty: 90 TABLET | Refills: 3 | Status: SHIPPED | OUTPATIENT
Start: 2021-07-06 | End: 2022-06-09

## 2021-07-06 RX ORDER — TRIAMCINOLONE ACETONIDE 1 MG/G
CREAM TOPICAL 2 TIMES DAILY
Qty: 453.6 G | Refills: 1 | Status: SHIPPED | OUTPATIENT
Start: 2021-07-06 | End: 2021-10-15

## 2021-07-06 RX ORDER — BACLOFEN 10 MG/1
10 TABLET ORAL 2 TIMES DAILY
Qty: 180 TABLET | Refills: 1 | Status: SHIPPED | OUTPATIENT
Start: 2021-07-06 | End: 2022-01-24

## 2021-07-06 NOTE — LETTER
July 7, 2021    Elaina Valenzuela  [address]   [place] [address]          Dear ,    We are writing to inform you of your test results.    Your results are overall not concerning.       Resulted Orders   Lipid panel reflex to direct LDL Non-fasting   Result Value Ref Range    Cholesterol 146 <200 mg/dL    Triglycerides 310 (H) <150 mg/dL      Comment:      Borderline high:  150-199 mg/dl  High:             200-499 mg/dl  Very high:       >499 mg/dl      HDL Cholesterol 37 (L) >39 mg/dL    LDL Cholesterol Calculated 47 <100 mg/dL      Comment:      Desirable:       <100 mg/dl    Non HDL Cholesterol 109 <130 mg/dL   Comprehensive metabolic panel   Result Value Ref Range    Sodium 139 133 - 144 mmol/L    Potassium 4.8 3.4 - 5.3 mmol/L    Chloride 105 94 - 109 mmol/L    Carbon Dioxide 33 (H) 20 - 32 mmol/L    Anion Gap 1 (L) 3 - 14 mmol/L    Glucose 94 70 - 99 mg/dL    Urea Nitrogen 15 7 - 30 mg/dL    Creatinine 1.10 0.66 - 1.25 mg/dL    GFR Estimate 73 >60 mL/min/[1.73_m2]      Comment:      Non  GFR Calc  Starting 12/18/2018, serum creatinine based estimated GFR (eGFR) will be   calculated using the Chronic Kidney Disease Epidemiology Collaboration   (CKD-EPI) equation.      GFR Estimate If Black 85 >60 mL/min/[1.73_m2]      Comment:       GFR Calc  Starting 12/18/2018, serum creatinine based estimated GFR (eGFR) will be   calculated using the Chronic Kidney Disease Epidemiology Collaboration   (CKD-EPI) equation.      Calcium 9.4 8.5 - 10.1 mg/dL    Bilirubin Total 0.8 0.2 - 1.3 mg/dL    Albumin 3.8 3.4 - 5.0 g/dL    Protein Total 8.6 6.8 - 8.8 g/dL    Alkaline Phosphatase 96 40 - 150 U/L    ALT 37 0 - 70 U/L    AST 29 0 - 45 U/L       If you have any questions or concerns, please call the clinic at the number listed above.       Sincerely,      Rosas Flores MD

## 2021-07-06 NOTE — PROGRESS NOTES
Assessment & Plan       ICD-10-CM    1. Atopic dermatitis, unspecified type  L20.9 triamcinolone (KENALOG) 0.1 % external cream   2. Hyperlipidemia LDL goal <100  E78.5 atorvastatin (LIPITOR) 20 MG tablet   3. Essential hypertension with goal blood pressure less than 140/90  I10 atorvastatin (LIPITOR) 20 MG tablet     Lipid panel reflex to direct LDL Non-fasting     Comprehensive metabolic panel   4. Paraplegia (H)  G82.20 baclofen (LIORESAL) 10 MG tablet   5. Idiopathic peripheral neuropathy  G60.9    6. Special screening for malignant neoplasms, colon  Z12.11 COLOGUARD(EXACT SCIENCES)   7. Encounter for lipid screening for cardiovascular disease  Z13.220 Lipid panel reflex to direct LDL Non-fasting    Z13.6    8. Skin lesion  L98.9 ADULT DERMATOLOGY REFERRAL   9. Morbid obesity (H)  E66.01          Review of external notes as documented elsewhere in note         Patient Instructions   Elaina    It was a pleasure seeing you in clinic today.  Here's the plan:    1. Skin rash - dermatitis - apply triamcinolone cream 2x per day.  Prescription sent to pharmacy  2. Cologuard test for colon cancer screening  3. Skin lesion - dermatology referral.  They will contact you to set up an appointment.  4. Blood tests today    Let me know if you have questions.    Rosas Escalante MD        Return in about 2 weeks (around 7/20/2021) for dermatology.    Rosas Escalante MD  [facility] BART Delaney is a 60 year old who presents for the following health issues  accompanied by his spouse:    HPI     Rash  Onset/Duration: 3 weeks  Description  Location: Right forearm  Character:  red  Itching: mild  Intensity:  moderate  Progression of Symptoms:  improving  Accompanying signs and symptoms:   Fever: no  Body aches or joint pain: no  Sore throat symptoms: no  Recent cold symptoms: no  History:           Previous episodes of similar rash: Long time ago  New exposures:  None  Recent travel:  no  Exposure to similar rash: no  Precipitating or alleviating factors:   Therapies tried and outcome: none    Rash left forearm x 3 weeks, no treatments tried, itchy, never happened before    Skin lesion left scalp, unsure of duration, feels it's getting larger    Needs handicap parking form  paraplegia    Medication refills          Review of Systems   Constitutional, HEENT, cardiovascular, pulmonary, gi and gu systems are negative, except as otherwise noted.      Objective    BP (!) 140/84   Pulse 71   Temp 98.4  F (36.9  C) (Oral)   Wt 104.1 kg (229 lb 8 oz)   SpO2 95%   BMI 38.19 kg/m    Body mass index is 38.19 kg/m .  Physical Exam   GENERAL: healthy, alert and no distress  EYES: Eyes grossly normal to inspection, PERRL and conjunctivae and sclerae normal  NECK: no adenopathy, no asymmetry, masses, or scars and thyroid normal to palpation  SKIN: maculopapular erythema 6cm right forearm, 1.5cm hyperpigmented 'stuck-on' lesion left scalp with surrounding erythema  PSYCH: mentation appears normal, affect normal/bright

## 2021-07-06 NOTE — PATIENT INSTRUCTIONS
Elaina    It was a pleasure seeing you in clinic today.  Here's the plan:    1. Skin rash - dermatitis - apply triamcinolone cream 2x per day.  Prescription sent to pharmacy  2. Cologuard test for colon cancer screening  3. Skin lesion - dermatology referral.  They will contact you to set up an appointment.  4. Blood tests today    Let me know if you have questions.    Rosas Escalante MD

## 2021-07-06 NOTE — LETTER
August 3, 2021      Elaina Valenzuela  [address]   [place] [address]        Dear ,    We are writing to inform you of your test results.    Your Cologuard test was negative.  Repeat in 3 years.        Resulted Orders   COLOGUARD(EXACT SCIENCES)   Result Value Ref Range    COLOGUARD-ABSTRACT Negative    Lipid panel reflex to direct LDL Non-fasting   Result Value Ref Range    Cholesterol 146 <200 mg/dL    Triglycerides 310 (H) <150 mg/dL      Comment:      Borderline high:  150-199 mg/dl  High:             200-499 mg/dl  Very high:       >499 mg/dl      HDL Cholesterol 37 (L) >39 mg/dL    LDL Cholesterol Calculated 47 <100 mg/dL      Comment:      Desirable:       <100 mg/dl    Non HDL Cholesterol 109 <130 mg/dL   Comprehensive metabolic panel   Result Value Ref Range    Sodium 139 133 - 144 mmol/L    Potassium 4.8 3.4 - 5.3 mmol/L    Chloride 105 94 - 109 mmol/L    Carbon Dioxide 33 (H) 20 - 32 mmol/L    Anion Gap 1 (L) 3 - 14 mmol/L    Glucose 94 70 - 99 mg/dL    Urea Nitrogen 15 7 - 30 mg/dL    Creatinine 1.10 0.66 - 1.25 mg/dL    GFR Estimate 73 >60 mL/min/[1.73_m2]      Comment:      Non  GFR Calc  Starting 12/18/2018, serum creatinine based estimated GFR (eGFR) will be   calculated using the Chronic Kidney Disease Epidemiology Collaboration   (CKD-EPI) equation.      GFR Estimate If Black 85 >60 mL/min/[1.73_m2]      Comment:       GFR Calc  Starting 12/18/2018, serum creatinine based estimated GFR (eGFR) will be   calculated using the Chronic Kidney Disease Epidemiology Collaboration   (CKD-EPI) equation.      Calcium 9.4 8.5 - 10.1 mg/dL    Bilirubin Total 0.8 0.2 - 1.3 mg/dL    Albumin 3.8 3.4 - 5.0 g/dL    Protein Total 8.6 6.8 - 8.8 g/dL    Alkaline Phosphatase 96 40 - 150 U/L    ALT 37 0 - 70 U/L    AST 29 0 - 45 U/L       If you have any questions or concerns, please call the clinic at the number listed above.       Sincerely,      Rosas Dave  Eron Flores MD/miller

## 2021-07-07 ASSESSMENT — ASTHMA QUESTIONNAIRES: ACT_TOTALSCORE: 25

## 2021-07-16 LAB — COLOGUARD-ABSTRACT: NEGATIVE

## 2021-07-26 ENCOUNTER — PATIENT OUTREACH (OUTPATIENT)
Dept: CARE COORDINATION | Facility: CLINIC | Age: 61
End: 2021-07-26

## 2021-07-26 NOTE — PROGRESS NOTES
Clinic Care Coordination Contact  [facility]/Voicemail       Clinical Data: CHW Outreach  Outreach attempted x 1. Left message on patient's voicemail with call back information and requested return call.    Plan: CHW will try to reach patient again in 1-2 business days.    Notes:  -schedule with SW (NE)  -patient needs help with transprtation  -Has Marine TAFOYA     Brenda Davis  [facility] Worker   [facility]  Care Coordination  [facility] [place], Gregory Teague [place], [facility]  [email or web address]   Office: [phone]  Fax: [phone]

## 2021-07-28 ENCOUNTER — PATIENT OUTREACH (OUTPATIENT)
Dept: FAMILY MEDICINE | Facility: CLINIC | Age: 61
End: 2021-07-28
Payer: COMMERCIAL

## 2021-07-28 NOTE — PROGRESS NOTES
Clinic Care Coordination Contact  Community Health Worker Initial Outreach            Patient accepts CC: No, The patient declined care coordination . Patient will be sent Care Coordination introduction letter for future reference.     The [facility] Community Health Worker spoke with the patient today at the request of the PCP to discuss possible Clinic Care Coordination enrollment. The service was described to the patient and immediate needs were discussed. The patient declined enrollment at this time. The PCP is encouraged to refer in the future if the patient's needs change.    The patient stated that he only needs help getting to and from his doctor appointments. The CHW gave the patient the [place] transportation phone number.     Brenda Davis  Community Health Worker   [facility]  Care Coordination  [place], [place], [place], [facility]  [email or web address]   Office: [phone]  Fax: [phone]

## 2021-09-02 ENCOUNTER — OFFICE VISIT (OUTPATIENT)
Dept: FAMILY MEDICINE | Facility: CLINIC | Age: 61
End: 2021-09-02
Payer: COMMERCIAL

## 2021-09-02 VITALS
BODY MASS INDEX: 39.19 KG/M2 | WEIGHT: 235.5 LBS | DIASTOLIC BLOOD PRESSURE: 84 MMHG | TEMPERATURE: 97.8 F | OXYGEN SATURATION: 96 % | HEART RATE: 81 BPM | SYSTOLIC BLOOD PRESSURE: 146 MMHG

## 2021-09-02 DIAGNOSIS — R21 RASH AND NONSPECIFIC SKIN ERUPTION: ICD-10-CM

## 2021-09-02 DIAGNOSIS — L29.9 GENERALIZED PRURITUS: ICD-10-CM

## 2021-09-02 DIAGNOSIS — J45.30 MILD PERSISTENT ASTHMA WITHOUT COMPLICATION: ICD-10-CM

## 2021-09-02 DIAGNOSIS — L20.9 ATOPIC DERMATITIS, UNSPECIFIED TYPE: Primary | ICD-10-CM

## 2021-09-02 PROCEDURE — 99214 OFFICE O/P EST MOD 30 MIN: CPT | Performed by: FAMILY MEDICINE

## 2021-09-02 RX ORDER — PERMETHRIN 50 MG/G
CREAM TOPICAL
Qty: 60 G | Refills: 1 | Status: SHIPPED | OUTPATIENT
Start: 2021-09-02

## 2021-09-02 RX ORDER — HYDROXYZINE HYDROCHLORIDE 25 MG/1
25 TABLET, FILM COATED ORAL 3 TIMES DAILY PRN
Qty: 30 TABLET | Refills: 0 | Status: SHIPPED | OUTPATIENT
Start: 2021-09-02 | End: 2021-10-15

## 2021-09-02 RX ORDER — ALBUTEROL SULFATE 90 UG/1
2 AEROSOL, METERED RESPIRATORY (INHALATION) EVERY 6 HOURS PRN
Qty: 18 G | Refills: 11 | Status: SHIPPED | OUTPATIENT
Start: 2021-09-02

## 2021-09-02 NOTE — PATIENT INSTRUCTIONS
Elaina    It was a pleasure seeing you in clinic today.  Here's the plan:    1. Skin rash - apply permethrin cream in the evening from head to toe (not the face) and wash off after 8-14 hours.  Hydroxyzine tablets prescribed for itching.  Referral ordered for dermatology (skin specialist), please call them to make an appointment.    Let me know if you have questions.    Rosas Escalante MD

## 2021-09-02 NOTE — PROGRESS NOTES
Assessment & Plan       ICD-10-CM    1. Atopic dermatitis, unspecified type  L20.9    2. Rash and nonspecific skin eruption  R21 hydrOXYzine (ATARAX) 25 MG tablet     permethrin (ELIMITE) 5 % external cream     Adult Dermatology Referral   3. Generalized pruritus  L29.9 hydrOXYzine (ATARAX) 25 MG tablet   4. Mild persistent asthma without complication  J45.30 albuterol (PROAIR HFA/PROVENTIL HFA/VENTOLIN HFA) 108 (90 Base) MCG/ACT inhaler         Review of external notes as documented elsewhere in note         Patient Instructions   Elaina    It was a pleasure seeing you in clinic today.  Here's the plan:    1. Skin rash - apply permethrin cream in the evening from head to toe (not the face) and wash off after 8-14 hours.  Hydroxyzine tablets prescribed for itching.  Referral ordered for dermatology (skin specialist), please call them to make an appointment.    Let me know if you have questions.    Rosas Escalante MD        Return in about 1 week (around 9/9/2021) for Dermatology.    Rosas Escalante MD  [facility] BART Delaney is a 60 year old who presents for the following health issues     HPI     Rash  Onset/Duration: 2 months  Description  Location: Both arms and chest  Character: red  Itching: moderate  Intensity:  moderate  Progression of Symptoms:  worsening  Accompanying signs and symptoms:   Fever: no  Body aches or joint pain: no  Sore throat symptoms: no  Recent cold symptoms: no  History:           Previous episodes of similar rash: Been ongoing  New exposures:  None  Recent travel: no  Exposure to similar rash: no  Precipitating or alleviating factors:   Therapies tried and outcome: triamcinolone (KENALOG) 0.1 % external cream        Review of Systems   Constitutional, HEENT, cardiovascular, pulmonary, gi and gu systems are negative, except as otherwise noted.      Objective    BP (!) 146/84   Pulse 81   Temp 97.8  F (36.6  C) (Oral)   Wt 106.8 kg (235 lb 8  oz)   SpO2 96%   BMI 39.19 kg/m    Body mass index is 39.19 kg/m .  Physical Exam   GENERAL: healthy, alert and no distress  EYES: Eyes grossly normal to inspection, PERRL and conjunctivae and sclerae normal  HENT: ear canals and TM's normal, nose and mouth without ulcers or lesions  NECK: no adenopathy, no asymmetry, masses, or scars and thyroid normal to palpation  MS: no gross musculoskeletal defects noted, no edema  SKIN: diffuse erythematous papular rash  PSYCH: mentation appears normal, affect normal/bright

## 2021-09-21 ENCOUNTER — TELEPHONE (OUTPATIENT)
Dept: FAMILY MEDICINE | Facility: CLINIC | Age: 61
End: 2021-09-21

## 2021-09-21 NOTE — TELEPHONE ENCOUNTER
Alysha is faxing over seating referral form  They need patient diagnosis coded to enter on the form  Can you call and let her know    [phone] alysha

## 2021-10-15 ENCOUNTER — NURSE TRIAGE (OUTPATIENT)
Dept: FAMILY MEDICINE | Facility: CLINIC | Age: 61
End: 2021-10-15

## 2021-10-15 DIAGNOSIS — L29.9 GENERALIZED PRURITUS: ICD-10-CM

## 2021-10-15 DIAGNOSIS — R21 RASH AND NONSPECIFIC SKIN ERUPTION: ICD-10-CM

## 2021-10-15 DIAGNOSIS — J45.30 MILD PERSISTENT ASTHMA WITHOUT COMPLICATION: Primary | ICD-10-CM

## 2021-10-15 DIAGNOSIS — L20.9 ATOPIC DERMATITIS, UNSPECIFIED TYPE: ICD-10-CM

## 2021-10-15 NOTE — TELEPHONE ENCOUNTER
Patient called the clinic.  He report that he continues to have itching and the current medication is not effective.  Called patient back using the  services.  Patient reports the small red bumps to both arms, back and chest.  Patient denies any other symptoms or concerns at this time.    1.  Patient is out of the medication:  hydrOXYzine (ATARAX) 25 MG tablet  triamcinolone (KENALOG) 0.1 % external cream and requesting refills and/or alternative medications.    2.  Patient has scheduled an appointment  With Dermatology 12/8/21.  His is inquiring if he can get an appointment sooner (by outside referral??)    3.  Patient is requesting refills for albuterol (PROVENTIL) (2.5 MG/3ML) 0.083% neb solution; Historical.  Does not have symptoms at this time. Just wants to have medication available at home      Patient was advised to call the clinic [phone] or seek medical at the  or ER, assistance if the symptoms persist or worsen and will be called with the provider's recommendations.    Patient verbalized understanding and has no further questions or concerns at this time.      Reason for Disposition    Itching is a chronic symptom (recurrent or ongoing AND present > 4 weeks)    Additional Information    Negative: Life-threatening reaction (anaphylaxis) in the past to similar substance (e.g., food, insect bite/sting, chemical, etc.) and < 2 hours since exposure    Negative: Difficulty breathing or wheezing    Negative: Difficulty swallowing or slurred speech and sudden onset    Negative: Sounds like a life-threatening emergency to the triager    Negative: Insect bites suspected    Negative: Hives suspected (urticaria, itchy pink bumps with pale centers that come and go over minutes to hours)    Negative: Widespread rash also present    Negative: Itching in just one area or spot    Negative: Patient sounds very sick or weak to the triager    Negative: MODERATE-SEVERE widespread itching (i.e., interferes  with sleep, normal activities or school) and not improved after 24 hours of itching Care Advice    Negative: MODERATE-SEVERE widespread itching (i.e., interferes with sleep, normal activities or school) and pregnant    Negative: Patient wants to be seen    Negative: MILD widespread itching AND pregnant    Negative: Hand or foot itching AND pregnant    Negative: Taking prescription medication that could cause itching (e.g., codeine/morphine/other opiates, aspirin)    Negative: Widespread itching and cause unknown and present > 48 hours    Protocols used: ITCHING - WIDESPREAD-A-OH

## 2021-10-18 RX ORDER — ALBUTEROL SULFATE 0.83 MG/ML
2.5 SOLUTION RESPIRATORY (INHALATION) EVERY 6 HOURS PRN
Qty: 90 ML | Refills: 2 | Status: SHIPPED | OUTPATIENT
Start: 2021-10-18

## 2021-10-18 RX ORDER — TRIAMCINOLONE ACETONIDE 1 MG/G
CREAM TOPICAL 2 TIMES DAILY
Qty: 453.6 G | Refills: 1 | Status: SHIPPED | OUTPATIENT
Start: 2021-10-18

## 2021-10-18 RX ORDER — HYDROXYZINE HYDROCHLORIDE 25 MG/1
25 TABLET, FILM COATED ORAL 3 TIMES DAILY PRN
Qty: 30 TABLET | Refills: 2 | Status: SHIPPED | OUTPATIENT
Start: 2021-10-18 | End: 2022-01-27

## 2021-12-01 DIAGNOSIS — I10 ESSENTIAL HYPERTENSION WITH GOAL BLOOD PRESSURE LESS THAN 140/90: ICD-10-CM

## 2021-12-04 NOTE — TELEPHONE ENCOUNTER
Routing refill request to provider for review/approval because:  Drug not on the [facility] refill protocol   BP Readings from Last 3 Encounters:   09/02/21 (!) 146/84   07/06/21 (!) 140/84   12/03/19 138/82

## 2021-12-06 RX ORDER — METOPROLOL TARTRATE 100 MG
TABLET ORAL
Qty: 180 TABLET | Refills: 0 | Status: SHIPPED | OUTPATIENT
Start: 2021-12-06 | End: 2022-02-24

## 2021-12-31 ENCOUNTER — MEDICAL CORRESPONDENCE (OUTPATIENT)
Dept: HEALTH INFORMATION MANAGEMENT | Facility: CLINIC | Age: 61
End: 2021-12-31
Payer: COMMERCIAL

## 2022-01-20 ENCOUNTER — MEDICAL CORRESPONDENCE (OUTPATIENT)
Dept: HEALTH INFORMATION MANAGEMENT | Facility: CLINIC | Age: 62
End: 2022-01-20
Payer: COMMERCIAL

## 2022-01-23 DIAGNOSIS — G82.20 PARAPLEGIA (H): ICD-10-CM

## 2022-01-24 DIAGNOSIS — L29.9 GENERALIZED PRURITUS: ICD-10-CM

## 2022-01-24 DIAGNOSIS — R21 RASH AND NONSPECIFIC SKIN ERUPTION: ICD-10-CM

## 2022-01-24 RX ORDER — BACLOFEN 10 MG/1
TABLET ORAL
Qty: 180 TABLET | Refills: 0 | Status: SHIPPED | OUTPATIENT
Start: 2022-01-24 | End: 2022-04-25

## 2022-01-26 NOTE — TELEPHONE ENCOUNTER
"Routing refill request to provider for review/approval because:  Per last visit 9/2021: \"  Skin rash - apply permethrin cream in the evening from head to toe (not the face) and wash off after 8-14 hours.  Hydroxyzine tablets prescribed for itching.  Referral ordered for dermatology (skin specialist), please call them to make an appointment.\"     Willing to continue refilling, or prefer visit?     Rosa Lawton RN, BSN, PHN  [facility]: [place]      "

## 2022-01-27 RX ORDER — HYDROXYZINE HYDROCHLORIDE 25 MG/1
TABLET, FILM COATED ORAL
Qty: 30 TABLET | Refills: 1 | Status: SHIPPED | OUTPATIENT
Start: 2022-01-27

## 2022-01-27 NOTE — TELEPHONE ENCOUNTER
Pt notified that Hydroxyzine was filled and that for further refills he needs to f/u with dermatology. Phone number provided for scheduling. Pt verbalized understanding.     Mary Truong RN   [facility]

## 2022-02-23 DIAGNOSIS — G60.9 IDIOPATHIC PERIPHERAL NEUROPATHY: ICD-10-CM

## 2022-02-23 DIAGNOSIS — I10 ESSENTIAL HYPERTENSION WITH GOAL BLOOD PRESSURE LESS THAN 140/90: ICD-10-CM

## 2022-02-24 RX ORDER — GABAPENTIN 300 MG/1
CAPSULE ORAL
Qty: 540 CAPSULE | Refills: 0 | Status: SHIPPED | OUTPATIENT
Start: 2022-02-24 | End: 2022-06-09

## 2022-02-24 RX ORDER — METOPROLOL TARTRATE 100 MG
TABLET ORAL
Qty: 180 TABLET | Refills: 0 | Status: SHIPPED | OUTPATIENT
Start: 2022-02-24 | End: 2022-06-09

## 2022-02-24 NOTE — TELEPHONE ENCOUNTER
Routing refill request to provider for review/approval because:  Drug not on the [facility] refill protocol   BP    BP Readings from Last 3 Encounters:   09/02/21 (!) 146/84   07/06/21 (!) 140/84   12/03/19 138/82              Pending Prescriptions:                       Disp   Refills    gabapentin (NEURONTIN) 300 MG capsule [Pha*540 ca*0        Sig: TAKE 2 CAPSULES BY MOUTH THREE TIMES DAILY    metoprolol tartrate (LOPRESSOR) 100 MG tab*180 ta*0        Sig: Take 1 tablet by mouth twice daily        Mitesh Garcia RN

## 2022-06-02 ENCOUNTER — TELEPHONE (OUTPATIENT)
Dept: FAMILY MEDICINE | Facility: CLINIC | Age: 62
End: 2022-06-02
Payer: COMMERCIAL

## 2022-06-02 DIAGNOSIS — I10 ESSENTIAL HYPERTENSION WITH GOAL BLOOD PRESSURE LESS THAN 140/90: ICD-10-CM

## 2022-06-03 NOTE — TELEPHONE ENCOUNTER
"Requested Prescriptions   Pending Prescriptions Disp Refills     metoprolol tartrate (LOPRESSOR) 100 MG tablet [Pharmacy Med Name: Metoprolol Tartrate 100 MG Oral Tablet] 180 tablet 0     Sig: Take 1 tablet by mouth twice daily       Beta-Blockers Protocol Failed - 6/2/2022  3:32 PM        Failed - Blood pressure under 140/90 in past 12 months     BP Readings from Last 3 Encounters:   09/02/21 (!) 146/84   07/06/21 (!) 140/84   12/03/19 138/82                 Passed - Patient is age 6 or older        Passed - Recent (12 mo) or future (30 days) visit within the authorizing provider's specialty     Patient has had an office visit with the authorizing provider or a provider within the authorizing providers department within the previous 12 mos or has a future within next 30 days. See \"Patient Info\" tab in inbasket, or \"Choose Columns\" in Meds & Orders section of the refill encounter.              Passed - Medication is active on med list           Routing refill request to provider for review/approval because:  BP    Kandy Garsia RN  [facility]           "

## 2022-06-06 RX ORDER — METOPROLOL TARTRATE 100 MG
TABLET ORAL
Qty: 180 TABLET | Refills: 0 | OUTPATIENT
Start: 2022-06-06

## 2022-06-06 NOTE — TELEPHONE ENCOUNTER
Called patient informed him of message below patient states he takes his BP occasionally at home but didn't have any readings. Scheduled an appointment for 6/9/25022 at 4:00pm    Thank you  LS

## 2022-06-09 ENCOUNTER — OFFICE VISIT (OUTPATIENT)
Dept: FAMILY MEDICINE | Facility: CLINIC | Age: 62
End: 2022-06-09
Payer: COMMERCIAL

## 2022-06-09 VITALS
BODY MASS INDEX: 40.94 KG/M2 | DIASTOLIC BLOOD PRESSURE: 72 MMHG | SYSTOLIC BLOOD PRESSURE: 138 MMHG | OXYGEN SATURATION: 96 % | HEART RATE: 69 BPM | WEIGHT: 246 LBS | TEMPERATURE: 98.5 F

## 2022-06-09 DIAGNOSIS — G60.9 IDIOPATHIC PERIPHERAL NEUROPATHY: ICD-10-CM

## 2022-06-09 DIAGNOSIS — E66.01 MORBID OBESITY (H): ICD-10-CM

## 2022-06-09 DIAGNOSIS — N31.9 NEUROGENIC BLADDER: ICD-10-CM

## 2022-06-09 DIAGNOSIS — I10 ESSENTIAL HYPERTENSION WITH GOAL BLOOD PRESSURE LESS THAN 140/90: ICD-10-CM

## 2022-06-09 DIAGNOSIS — E78.5 HYPERLIPIDEMIA LDL GOAL <100: ICD-10-CM

## 2022-06-09 DIAGNOSIS — D32.1 SPINAL MENINGIOMA (H): ICD-10-CM

## 2022-06-09 DIAGNOSIS — F33.1 MAJOR DEPRESSIVE DISORDER, RECURRENT EPISODE, MODERATE (H): ICD-10-CM

## 2022-06-09 DIAGNOSIS — K59.01 SLOW TRANSIT CONSTIPATION: Primary | ICD-10-CM

## 2022-06-09 DIAGNOSIS — G82.20 PARAPLEGIA (H): ICD-10-CM

## 2022-06-09 PROCEDURE — 99214 OFFICE O/P EST MOD 30 MIN: CPT | Performed by: FAMILY MEDICINE

## 2022-06-09 RX ORDER — GABAPENTIN 300 MG/1
CAPSULE ORAL
Qty: 540 CAPSULE | Refills: 1 | Status: SHIPPED | OUTPATIENT
Start: 2022-06-09 | End: 2023-02-03

## 2022-06-09 RX ORDER — METOPROLOL TARTRATE 100 MG
100 TABLET ORAL 2 TIMES DAILY
Qty: 180 TABLET | Refills: 1 | Status: SHIPPED | OUTPATIENT
Start: 2022-06-09 | End: 2022-12-08

## 2022-06-09 RX ORDER — ATORVASTATIN CALCIUM 20 MG/1
20 TABLET, FILM COATED ORAL DAILY
Qty: 90 TABLET | Refills: 3 | Status: SHIPPED | OUTPATIENT
Start: 2022-06-09 | End: 2023-07-17

## 2022-06-09 ASSESSMENT — ANXIETY QUESTIONNAIRES
1. FEELING NERVOUS, ANXIOUS, OR ON EDGE: SEVERAL DAYS
2. NOT BEING ABLE TO STOP OR CONTROL WORRYING: SEVERAL DAYS
6. BECOMING EASILY ANNOYED OR IRRITABLE: NEARLY EVERY DAY
GAD7 TOTAL SCORE: 11
7. FEELING AFRAID AS IF SOMETHING AWFUL MIGHT HAPPEN: SEVERAL DAYS
8. IF YOU CHECKED OFF ANY PROBLEMS, HOW DIFFICULT HAVE THESE MADE IT FOR YOU TO DO YOUR WORK, TAKE CARE OF THINGS AT HOME, OR GET ALONG WITH OTHER PEOPLE?: VERY DIFFICULT
3. WORRYING TOO MUCH ABOUT DIFFERENT THINGS: SEVERAL DAYS
5. BEING SO RESTLESS THAT IT IS HARD TO SIT STILL: NEARLY EVERY DAY
GAD7 TOTAL SCORE: 11
GAD7 TOTAL SCORE: 11
4. TROUBLE RELAXING: SEVERAL DAYS
7. FEELING AFRAID AS IF SOMETHING AWFUL MIGHT HAPPEN: SEVERAL DAYS

## 2022-06-09 ASSESSMENT — PATIENT HEALTH QUESTIONNAIRE - PHQ9
SUM OF ALL RESPONSES TO PHQ QUESTIONS 1-9: 8
10. IF YOU CHECKED OFF ANY PROBLEMS, HOW DIFFICULT HAVE THESE PROBLEMS MADE IT FOR YOU TO DO YOUR WORK, TAKE CARE OF THINGS AT HOME, OR GET ALONG WITH OTHER PEOPLE: SOMEWHAT DIFFICULT
SUM OF ALL RESPONSES TO PHQ QUESTIONS 1-9: 8

## 2022-06-09 NOTE — PROGRESS NOTES
Assessment & Plan       ICD-10-CM    1. Slow transit constipation  K59.01 Fiber Complete TABS   2. Idiopathic peripheral neuropathy  G60.9 gabapentin (NEURONTIN) 300 MG capsule   3. Essential hypertension with goal blood pressure less than 140/90  I10 metoprolol tartrate (LOPRESSOR) 100 MG tablet     atorvastatin (LIPITOR) 20 MG tablet   4. Hyperlipidemia LDL goal <100  E78.5 atorvastatin (LIPITOR) 20 MG tablet   5. Paraplegia (H)  G82.20 Physiatry Referral     Catheterization Supplies Order   6. Neurogenic bladder  N31.9 Catheterization Supplies Order   7. Major depressive disorder, recurrent episode, moderate (H)  F33.1    8. Morbid obesity (H)  E66.01    9. Spinal meningioma (H)  D32.1          Review of external notes as documented elsewhere in note         Patient Instructions   Leisa Ortiz RN BSN  Outpatient Spinal Cord Injury Nurse Navigator  [facility]  Phone: [phone]      Blood pressure cuff - OMRON      No follow-ups on file.    Rosas Escalante MD  [facility] BART Delaney is a 61 year old who presents for the following health issues     Chief Complaint   Patient presents with     Hypertension     Patient Request     Discuss fiber powder or tablet. Requesting Viagra if he is able to take medication         History of Present Illness       Hypertension: He presents for follow up of hypertension.  He does not check blood pressure  regularly outside of the clinic. Outpatient blood pressures have not been over 140/90. He follows a low salt diet.      Today's PHQ-9         PHQ-9 Total Score: 8    PHQ-9 Q9 Thoughts of better off dead/self-harm past 2 weeks :   Not at all    How difficult have these problems made it for you to do your work, take care of things at home, or get along with other people: Somewhat difficult  Today's JHONY-7 Score: 11     PHQ 7/23/2018 6/30/2020 6/9/2022   PHQ-9 Total Score 17 0 8   Q9: Thoughts of better off  dead/self-harm past 2 weeks Nearly every day Not at all Not at all     JHONY-7 SCORE 12/29/2016 10/10/2017 6/9/2022   Total Score - - -   Total Score - - 11 (moderate anxiety)   Total Score 10 11 11     History of spinal meningioma and subsequent paraplegia  Urinary incontinence - requests condom catheter  Patient requests viagra as well to assess whether erection is possible  Chronic constipation, hard stool, infrequent  HTN - needs medication refill        Review of Systems   Constitutional, HEENT, cardiovascular, pulmonary, gi and gu systems are negative, except as otherwise noted.      Objective    /72   Pulse 69   Temp 98.5  F (36.9  C) (Oral)   Wt 111.6 kg (246 lb)   SpO2 96%   BMI 40.94 kg/m    Body mass index is 40.94 kg/m .  Physical Exam  Constitutional:       General: He is not in acute distress.     Appearance: Normal appearance. He is well-developed.   HENT:      Head: Normocephalic and atraumatic.      Right Ear: External ear normal.      Left Ear: External ear normal.   Eyes:      General: No scleral icterus.     Conjunctiva/sclera: Conjunctivae normal.   Pulmonary:      Effort: Pulmonary effort is normal.   Musculoskeletal:      Cervical back: Normal range of motion and neck supple.   Skin:     General: Skin is warm and dry.   Neurological:      Mental Status: He is alert and oriented to person, place, and time.   Psychiatric:         Mood and Affect: Mood normal.         Behavior: Behavior normal.         Thought Content: Thought content normal.         Judgment: Judgment normal.

## 2022-06-09 NOTE — PATIENT INSTRUCTIONS
Leisa Ortiz RN BSN  Outpatient Spinal Cord Injury Nurse Navigator  [facility]  Phone: [phone]      Blood pressure cuff - OMRON

## 2022-06-09 NOTE — PROGRESS NOTES
DME (Durable Medical Equipment) Orders and Documentation  Orders Placed This Encounter   Procedures     Catheterization Supplies Order      The patient was assessed and it was determined the patient is in need of the following listed DME Supplies/Equipment. Please complete supporting documentation below to demonstrate medical necessity.      Catheterization Supplies Documentation  Patient has: urinary incontinence.  Catheter type: External (35/month). Patient requires catheterization up to 3 times a day.

## 2022-06-22 PROBLEM — F33.1 MAJOR DEPRESSIVE DISORDER, RECURRENT EPISODE, MODERATE (H): Status: ACTIVE | Noted: 2022-06-22

## 2022-07-08 ENCOUNTER — TELEPHONE (OUTPATIENT)
Dept: FAMILY MEDICINE | Facility: CLINIC | Age: 62
End: 2022-07-08

## 2022-07-08 DIAGNOSIS — G89.29 OTHER CHRONIC PAIN: ICD-10-CM

## 2022-07-08 NOTE — TELEPHONE ENCOUNTER
Patient calling for Dr. Flores.  He was seen by PCP on 22.   Says he has 2 issues:    1)  He called [facility] to go for PT, referral is , needs new referral.   [facility] [place] location.   Needs order faxed.    2)  Says he wants refill of naproxen for back pain as well.    Pharmacy selected.      I see he has a current referral in [facility] for Yonatan Whitmore physiatry.    I advised him of that and will have team print and fax that referral.   I would imaging the [place] location can help him schedule for [place]?      Routed to PCP and to TC pool to address issues above.    Elicia Yarbrough RN  [facility]

## 2022-07-13 RX ORDER — NAPROXEN 500 MG/1
TABLET ORAL
Qty: 90 TABLET | Refills: 1 | Status: SHIPPED | OUTPATIENT
Start: 2022-07-13 | End: 2023-09-04

## 2022-12-07 DIAGNOSIS — I10 ESSENTIAL HYPERTENSION WITH GOAL BLOOD PRESSURE LESS THAN 140/90: ICD-10-CM

## 2022-12-08 RX ORDER — METOPROLOL TARTRATE 100 MG
TABLET ORAL
Qty: 180 TABLET | Refills: 0 | Status: SHIPPED | OUTPATIENT
Start: 2022-12-08 | End: 2023-03-10

## 2023-03-10 DIAGNOSIS — I10 ESSENTIAL HYPERTENSION WITH GOAL BLOOD PRESSURE LESS THAN 140/90: ICD-10-CM

## 2023-03-10 RX ORDER — METOPROLOL TARTRATE 100 MG
TABLET ORAL
Qty: 180 TABLET | Refills: 0 | Status: SHIPPED | OUTPATIENT
Start: 2023-03-10 | End: 2023-06-01

## 2023-09-01 DIAGNOSIS — G89.29 OTHER CHRONIC PAIN: ICD-10-CM

## 2023-09-01 DIAGNOSIS — I10 ESSENTIAL HYPERTENSION WITH GOAL BLOOD PRESSURE LESS THAN 140/90: ICD-10-CM

## 2023-09-04 RX ORDER — NAPROXEN 500 MG/1
TABLET ORAL
Qty: 90 TABLET | Refills: 0 | Status: SHIPPED | OUTPATIENT
Start: 2023-09-04

## 2023-09-04 RX ORDER — METOPROLOL TARTRATE 100 MG
TABLET ORAL
Qty: 180 TABLET | Refills: 0 | Status: SHIPPED | OUTPATIENT
Start: 2023-09-04 | End: 2023-12-13

## 2023-09-18 DIAGNOSIS — G60.9 IDIOPATHIC PERIPHERAL NEUROPATHY: ICD-10-CM

## 2023-09-21 RX ORDER — GABAPENTIN 300 MG/1
CAPSULE ORAL
Qty: 540 CAPSULE | Refills: 0 | Status: SHIPPED | OUTPATIENT
Start: 2023-09-21 | End: 2023-12-15

## 2023-10-05 DIAGNOSIS — G82.20 PARAPLEGIA (H): ICD-10-CM

## 2023-10-06 RX ORDER — BACLOFEN 10 MG/1
TABLET ORAL
Qty: 180 TABLET | Refills: 0 | Status: SHIPPED | OUTPATIENT
Start: 2023-10-06 | End: 2024-01-15

## 2023-12-11 DIAGNOSIS — I10 ESSENTIAL HYPERTENSION WITH GOAL BLOOD PRESSURE LESS THAN 140/90: ICD-10-CM

## 2023-12-12 NOTE — TELEPHONE ENCOUNTER
Called patient.  He is checking BP daily and it is ranging from 120-130's/69-70's  He did not have exact dates/times nor exact BP readings     Gene Walters RN  [facility]

## 2023-12-13 RX ORDER — METOPROLOL TARTRATE 100 MG
TABLET ORAL
Qty: 180 TABLET | Refills: 3 | Status: SHIPPED | OUTPATIENT
Start: 2023-12-13

## 2023-12-14 DIAGNOSIS — G60.9 IDIOPATHIC PERIPHERAL NEUROPATHY: ICD-10-CM

## 2023-12-15 RX ORDER — GABAPENTIN 300 MG/1
CAPSULE ORAL
Qty: 540 CAPSULE | Refills: 2 | Status: SHIPPED | OUTPATIENT
Start: 2023-12-15

## 2023-12-26 ENCOUNTER — IMMUNIZATION (OUTPATIENT)
Dept: NURSING | Facility: CLINIC | Age: 63
End: 2023-12-26
Payer: COMMERCIAL

## 2023-12-26 PROCEDURE — 91320 SARSCV2 VAC 30MCG TRS-SUC IM: CPT

## 2023-12-26 PROCEDURE — 90480 ADMN SARSCOV2 VAC 1/ONLY CMP: CPT

## 2023-12-29 ENCOUNTER — TRANSFERRED RECORDS (OUTPATIENT)
Dept: HEALTH INFORMATION MANAGEMENT | Facility: CLINIC | Age: 63
End: 2023-12-29
Payer: COMMERCIAL

## 2024-01-08 DIAGNOSIS — Z71.89 OTHER SPECIFIED COUNSELING: Primary | Chronic | ICD-10-CM

## 2024-01-09 ENCOUNTER — PATIENT OUTREACH (OUTPATIENT)
Dept: CARE COORDINATION | Facility: CLINIC | Age: 64
End: 2024-01-09
Payer: COMMERCIAL

## 2024-01-09 ASSESSMENT — ACTIVITIES OF DAILY LIVING (ADL): DEPENDENT_IADLS:: CLEANING;COOKING;LAUNDRY;SHOPPING;MEAL PREPARATION;MEDICATION MANAGEMENT;TRANSPORTATION

## 2024-01-09 NOTE — PROGRESS NOTES
Clinic Care Coordination Contact    Initial [facility] phone assessment,  [employer] referral.  Patient and spouse wish to discuss future planning.  Patient has mild cognitive impairment and is a paraplegic.  He reports today his spouse works, he is home during the day by himself.  She administers medications in the morning before work, assists him to get dressed (as needed) and with bathing needs.  She has a meal ready for him for lunch on the table.  Patient reports he has a shower chair and grab bars in the shower, spouse will assist with transfers as needed and with washing his hair.  Spouse provides homemaking needs, transportation and does shopping.  Patient can propel self in w/c, uses bathroom independently and can transfer self to bed.  Patient will read on Ipad or watch TV while spouse at work.  SW discussed income guidelines for MA, patient reports he would not qualify for MA (therefore could not obtain a  and possible PCA).  Patient does not have LTC insurance, is not eligible for VA benefits. NISH will send # [place] to discuss alternate options, however, this is income based.      Patient thanked NISH for calling, stated nothing further is needed.  NISH will send information for [place] as discussed above and will close to CC     Corrina Hooker, SHARONW, MSW   [facility]  Care Coordination  [facility]  [phone]  1/9/2024 11:57 AM   
5 years ago

## 2024-01-09 NOTE — LETTER
[facility] CARE COORDINATION  [address] [place] [address]   January 9, 2024    Elaina Valenzuela  [address]  [place] [address]      Dear Elaina,    I am a clinic care coordinator who works with Rosas Escalante MD with the [facility]. I wanted to thank you for spending the time to talk with me.  Below is a description of clinic care coordination and how I can further assist you.       The clinic care coordination team is made up of a registered nurse, , financial resource worker and community health worker who understand the health care system. The goal of clinic care coordination is to help you manage your health and improve access to the health care system. Our team works alongside your provider to assist you in determining your health and social needs. We can help you obtain health care and community resources, providing you with necessary information and education. We can work with you through any barriers and develop a care plan that helps coordinate and strengthen the communication between you and your care team.  Our services are voluntary and are offered without charge to you personally.    Please feel free to contact me with any questions or concerns regarding care coordination and what we can offer.      We are focused on providing you with the highest-quality healthcare experience possible.    As discussed today, please call [facility] [place] and request a [place] Cho[place] assessment potential [facility] services.      [facility] Choices assessment:    The [place] helps to identify a person's support needs and the services and programs that will meet those needs. It also helps a person to access those services and programs.  Please complete referral form at this link:     [email or web address]?bidId=    [phone]    Sincerely,     GERALD Smith, MSW Clinic    [facility]  Care Coordination  [facility] [facility]   [email or web address]  Office: [phone]  Employed by [employer]

## 2024-01-14 DIAGNOSIS — G82.20 PARAPLEGIA (H): ICD-10-CM

## 2024-01-15 RX ORDER — BACLOFEN 10 MG/1
TABLET ORAL
Qty: 180 TABLET | Refills: 3 | Status: SHIPPED | OUTPATIENT
Start: 2024-01-15

## 2024-07-05 DIAGNOSIS — I10 ESSENTIAL HYPERTENSION WITH GOAL BLOOD PRESSURE LESS THAN 140/90: ICD-10-CM

## 2024-07-05 DIAGNOSIS — E78.5 HYPERLIPIDEMIA LDL GOAL <100: ICD-10-CM

## 2024-07-05 RX ORDER — ATORVASTATIN CALCIUM 20 MG/1
TABLET, FILM COATED ORAL
Qty: 90 TABLET | Refills: 1 | Status: SHIPPED | OUTPATIENT
Start: 2024-07-05

## 2024-08-02 DIAGNOSIS — Z12.11 COLON CANCER SCREENING: ICD-10-CM

## 2024-08-16 ENCOUNTER — ORDERS ONLY (AUTO-RELEASED) (OUTPATIENT)
Dept: ADMISSION | Facility: CLINIC | Age: 64
End: 2024-08-16
Payer: COMMERCIAL

## 2024-08-16 DIAGNOSIS — Z12.11 COLON CANCER SCREENING: ICD-10-CM

## 2024-09-05 LAB — NONINV COLON CA DNA+OCC BLD SCRN STL QL: NEGATIVE

## 2024-10-25 ENCOUNTER — TRANSFERRED RECORDS (OUTPATIENT)
Dept: HEALTH INFORMATION MANAGEMENT | Facility: CLINIC | Age: 64
End: 2024-10-25

## 2024-12-10 DIAGNOSIS — G60.9 IDIOPATHIC PERIPHERAL NEUROPATHY: ICD-10-CM

## 2024-12-10 DIAGNOSIS — I10 ESSENTIAL HYPERTENSION WITH GOAL BLOOD PRESSURE LESS THAN 140/90: ICD-10-CM

## 2024-12-10 RX ORDER — GABAPENTIN 300 MG/1
CAPSULE ORAL
Qty: 540 CAPSULE | Refills: 0 | Status: SHIPPED | OUTPATIENT
Start: 2024-12-10

## 2024-12-10 RX ORDER — METOPROLOL TARTRATE 100 MG/1
TABLET ORAL
Qty: 180 TABLET | Refills: 0 | Status: SHIPPED | OUTPATIENT
Start: 2024-12-10

## 2025-01-08 ENCOUNTER — TELEPHONE (OUTPATIENT)
Dept: FAMILY MEDICINE | Facility: CLINIC | Age: 65
End: 2025-01-08

## 2025-01-08 DIAGNOSIS — G82.20 PARAPLEGIA (H): ICD-10-CM

## 2025-01-08 DIAGNOSIS — I10 ESSENTIAL HYPERTENSION WITH GOAL BLOOD PRESSURE LESS THAN 140/90: ICD-10-CM

## 2025-01-08 DIAGNOSIS — E78.5 HYPERLIPIDEMIA LDL GOAL <100: ICD-10-CM

## 2025-01-09 RX ORDER — BACLOFEN 10 MG/1
TABLET ORAL
Qty: 180 TABLET | Refills: 0 | OUTPATIENT
Start: 2025-01-09

## 2025-01-09 RX ORDER — ATORVASTATIN CALCIUM 20 MG/1
TABLET, FILM COATED ORAL
Qty: 90 TABLET | Refills: 0 | OUTPATIENT
Start: 2025-01-09

## 2025-01-09 NOTE — TELEPHONE ENCOUNTER
Refill request received for baclofen and atorvastatin.  Has not been seen in over 1 year.  Needs an appointment for any further refills    Gene Walters RN  [facility]

## 2025-01-10 DIAGNOSIS — E78.5 HYPERLIPIDEMIA LDL GOAL <100: ICD-10-CM

## 2025-01-10 DIAGNOSIS — I10 ESSENTIAL HYPERTENSION WITH GOAL BLOOD PRESSURE LESS THAN 140/90: ICD-10-CM

## 2025-01-10 DIAGNOSIS — G82.20 PARAPLEGIA (H): ICD-10-CM

## 2025-01-13 RX ORDER — ATORVASTATIN CALCIUM 20 MG/1
TABLET, FILM COATED ORAL
Qty: 90 TABLET | Refills: 0 | OUTPATIENT
Start: 2025-01-13

## 2025-01-13 RX ORDER — BACLOFEN 10 MG/1
TABLET ORAL
Qty: 180 TABLET | Refills: 0 | OUTPATIENT
Start: 2025-01-13

## 2025-01-14 NOTE — TELEPHONE ENCOUNTER
Patient returned call to clinic.    Scheduled virtual visit 1/21/25 for med review.    Patient states it is too difficult to come  for in person visit.    Willing to send in Izabel refill as he will run out on 1/18/25.    Pharmacy pended.    Christine M Klisch, RN

## 2025-01-23 ENCOUNTER — VIRTUAL VISIT (OUTPATIENT)
Dept: FAMILY MEDICINE | Facility: CLINIC | Age: 65
End: 2025-01-23

## 2025-01-23 DIAGNOSIS — F33.1 MAJOR DEPRESSIVE DISORDER, RECURRENT EPISODE, MODERATE (H): ICD-10-CM

## 2025-01-23 DIAGNOSIS — I10 ESSENTIAL HYPERTENSION WITH GOAL BLOOD PRESSURE LESS THAN 140/90: ICD-10-CM

## 2025-01-23 DIAGNOSIS — G82.20 PARAPLEGIA (H): Primary | ICD-10-CM

## 2025-01-23 DIAGNOSIS — E66.01 MORBID OBESITY (H): ICD-10-CM

## 2025-01-23 DIAGNOSIS — D32.1 SPINAL MENINGIOMA (H): ICD-10-CM

## 2025-01-23 DIAGNOSIS — M62.81 MUSCLE WEAKNESS OF LOWER EXTREMITY: ICD-10-CM

## 2025-01-23 DIAGNOSIS — E78.5 HYPERLIPIDEMIA LDL GOAL <100: ICD-10-CM

## 2025-01-23 DIAGNOSIS — N31.9 NEUROGENIC BLADDER: ICD-10-CM

## 2025-01-23 RX ORDER — ATORVASTATIN CALCIUM 20 MG/1
20 TABLET, FILM COATED ORAL DAILY
Qty: 90 TABLET | Refills: 3 | Status: SHIPPED | OUTPATIENT
Start: 2025-01-23

## 2025-01-23 RX ORDER — BACLOFEN 10 MG/1
10 TABLET ORAL 2 TIMES DAILY
Qty: 180 TABLET | Refills: 3 | Status: SHIPPED | OUTPATIENT
Start: 2025-01-23

## 2025-01-23 ASSESSMENT — ASTHMA QUESTIONNAIRES
QUESTION_2 LAST FOUR WEEKS HOW OFTEN HAVE YOU HAD SHORTNESS OF BREATH: NOT AT ALL
QUESTION_4 LAST FOUR WEEKS HOW OFTEN HAVE YOU USED YOUR RESCUE INHALER OR NEBULIZER MEDICATION (SUCH AS ALBUTEROL): NOT AT ALL
QUESTION_5 LAST FOUR WEEKS HOW WOULD YOU RATE YOUR ASTHMA CONTROL: WELL CONTROLLED
ACT_TOTALSCORE: 24
QUESTION_1 LAST FOUR WEEKS HOW MUCH OF THE TIME DID YOUR ASTHMA KEEP YOU FROM GETTING AS MUCH DONE AT WORK, SCHOOL OR AT HOME: NONE OF THE TIME
ACUTE_EXACERBATION_TODAY: NO
ACT_TOTALSCORE: 24
QUESTION_3 LAST FOUR WEEKS HOW OFTEN DID YOUR ASTHMA SYMPTOMS (WHEEZING, COUGHING, SHORTNESS OF BREATH, CHEST TIGHTNESS OR PAIN) WAKE YOU UP AT NIGHT OR EARLIER THAN USUAL IN THE MORNING: NOT AT ALL

## 2025-01-23 NOTE — PROGRESS NOTES
"  If patient has telephone visit, have they been educated on video visit as preferred visit method and offered to change to video visit? NOT APPLICABLE        Instructions Relayed to Patient by Virtual Roomer:     If patient is not active on HelloFax:  Relayed the following to patient: \"Would you like us to text or email you a link to join your appointment now or when your provider is ready to initiate the virtual visit?\"      Patient Confirmed they will join visit via: Provider to call patient for telephone visit   Reminded patient to ensure they were logged on to virtual visit by arrival time listed.   Asked if patient has flexibility to initiate visit sooner than arrival time: patient is unable to initiate visit earlier than arrival time     If pediatric virtual visit, ensured pediatric patient along with parent/guardian will be present for video visit.     Patient offered the website [email or web address] and/or phone number to HelloFax Help line: [phone]  Elaina is a 64 year old who is being evaluated via a billable telephone visit.    What phone number would you like to be contacted at? [phone]   How would you like to obtain your AVS? Mail a copy  Originating Location (pt. Location): Home    Distant Location (provider location):  On-site  Telephone visit completed due to the patient did not have access to video, while the distant provider did.    Assessment & Plan       ICD-10-CM    1. Paraplegia (H)  G82.20 baclofen (LIORESAL) 10 MG tablet     Home Care Referral      2. Essential hypertension with goal blood pressure less than 140/90  I10 atorvastatin (LIPITOR) 20 MG tablet      3. Spinal meningioma (H)  D32.1 Home Care Referral      4. Hyperlipidemia LDL goal <100  E78.5 atorvastatin (LIPITOR) 20 MG tablet      5. Muscle weakness of lower extremity  M62.81 Home Care Referral      6. Neurogenic bladder  N31.9 Home Care Referral      7. Major depressive disorder, recurrent episode, " moderate (H)  F33.1       8. Morbid obesity (H)  E66.01             Depression - well controlled with current therapy        The longitudinal plan of care for the diagnosis(es)/condition(s) as documented were addressed during this visit. Due to the added complexity in care, I will continue to support Elaina in the subsequent management and with ongoing continuity of care.             There are no Patient Instructions on file for this visit.    Lashonda Delaney is a 64 year old, presenting for the following health issues:  Recheck Medication (Refills on meds)        1/23/2025     5:02 PM   Additional Questions   Roomed by Crystal   Accompanied by self         1/23/2025     5:02 PM   Patient Reported Additional Medications   Patient reports taking the following new medications none     HPI     Medication Followup of Atorvastatin and Baclofen  Taking Medication as prescribed: yes  Side Effects:  None  Medication Helping Symptoms:  yes    Patient needs medication refills  Unable to transfer self to transport to come in to clinic      Review of Systems  Constitutional, HEENT, cardiovascular, pulmonary, gi and gu systems are negative, except as otherwise noted.      Objective    Vitals - Patient Reported  Pain Score: No Pain (0)        Physical Exam   General: Alert and no distress //Respiratory: No audible wheeze, cough, or shortness of breath // Psychiatric:  Appropriate affect, tone, and pace of words            Phone call duration: 17 minutes  Signed Electronically by: Rosas Escalante MD

## 2025-01-28 ENCOUNTER — TELEPHONE (OUTPATIENT)
Dept: FAMILY MEDICINE | Facility: CLINIC | Age: 65
End: 2025-01-28
Payer: COMMERCIAL

## 2025-01-28 NOTE — TELEPHONE ENCOUNTER
Arlin LPN with [facility] calling and was transferred to RN triage line with questions in regards to patients insurance coverage etc.     RN stated she is unsure of these questions and TC's may be able to better address these questions or know who can. RN transferred Arlin to TC's line to further advise.         Pretty Case RN on 1/28/2025 at 10:08 AM

## 2025-01-28 NOTE — TELEPHONE ENCOUNTER
Routing to team    Can you please verify with the patient if he has active insurance? If so, please update the chart with this information and let home care know. Home care cannot admit without insurance info. If he does not have active insurance, would he be interested in a care coordination referral to assist with resources?      ----- Message -----   From: Deepali Verdugo   Sent: 1/28/2025  10:11 AM CST   To: Rosas Flores MD; *   Subject: DECLINED HOME HEALTH REFERRAL                    Good morning,   I have reached out to the patient and to your office regarding insurance information. I was transferred several times and was told pt do not have insurance. I attempted to get back to the nurses line to notify of decline, but waited again on hold for 15 minutes. I am wanting to notify that we are unable to accept this referral at this time d/t no insurance. Thank you   Deepali Domínguez Lvn   Clinical    [employer]   [phone]

## 2025-01-29 NOTE — TELEPHONE ENCOUNTER
Had the patient Rep here at clinic look into this she said she entered his information yesterday has [employer] I do believe she said.  Vanesa Treviño   [facility]

## 2025-03-06 DIAGNOSIS — I10 ESSENTIAL HYPERTENSION WITH GOAL BLOOD PRESSURE LESS THAN 140/90: ICD-10-CM

## 2025-03-06 RX ORDER — METOPROLOL TARTRATE 100 MG/1
TABLET ORAL
Qty: 180 TABLET | Refills: 3 | Status: SHIPPED | OUTPATIENT
Start: 2025-03-06

## 2025-04-05 DIAGNOSIS — G60.9 IDIOPATHIC PERIPHERAL NEUROPATHY: ICD-10-CM

## 2025-04-07 RX ORDER — GABAPENTIN 300 MG/1
CAPSULE ORAL
Qty: 540 CAPSULE | Refills: 1 | Status: SHIPPED | OUTPATIENT
Start: 2025-04-07

## 2025-04-29 ENCOUNTER — TELEPHONE (OUTPATIENT)
Dept: FAMILY MEDICINE | Facility: CLINIC | Age: 65
End: 2025-04-29
Payer: COMMERCIAL

## 2025-04-29 NOTE — TELEPHONE ENCOUNTER
Patient Quality Outreach    Patient is due for the following:   Asthma  -  AAP  Hypertension -  BP check  Physical Preventive Adult Physical      Topic Date Due    Pneumococcal Vaccine (1 of 2 - PCV) Never done    Zoster (Shingles) Vaccine (1 of 2) Never done    Diptheria Tetanus Pertussis (DTAP/TDAP/TD) Vaccine (6 - Td or Tdap) 03/08/2020    Flu Vaccine (1) 09/01/2024    COVID-19 Vaccine (5 - 2024-25 season) 09/01/2024       Action(s) Taken:   No follow up needed at this time.    130's/70-80's    Type of outreach:    Phone, spoke to patient/parent. Patient checks blood pressure at home and is within range.    Questions for provider review:    None         Dian Mayfield [facility]  Chart routed to None.

## 2025-05-06 DIAGNOSIS — G89.29 OTHER CHRONIC PAIN: ICD-10-CM

## 2025-05-07 RX ORDER — NAPROXEN 500 MG/1
TABLET ORAL
Qty: 90 TABLET | Refills: 1 | Status: SHIPPED | OUTPATIENT
Start: 2025-05-07

## 2025-07-18 ENCOUNTER — TRANSFERRED RECORDS (OUTPATIENT)
Dept: HEALTH INFORMATION MANAGEMENT | Facility: CLINIC | Age: 65
End: 2025-07-18

## 2025-07-21 DIAGNOSIS — Z71.89 COUNSELING AND COORDINATION OF CARE: Primary | ICD-10-CM

## 2025-07-22 ENCOUNTER — PATIENT OUTREACH (OUTPATIENT)
Dept: CARE COORDINATION | Facility: CLINIC | Age: 65
End: 2025-07-22
Payer: COMMERCIAL

## 2025-07-22 NOTE — PROGRESS NOTES
Community Health Worker Initial Outreach    CHW Initial Information Gathering:  Referral Source: PCP  [facility]  [phone]  Preferred Urgent Care: Other  Current living arrangement:: I live in a private home  Type of residence:: Private home - stairs  Community Resources: None  Supplies Currently Used at Home: None  Informal Support system:: Family  No PCP office visit in Past Year: No  Transportation means:: Regular car  CHW Additional Questions  If ED/Hospital discharge, follow-up appointment scheduled as recommended?: N/A  Medication changes made following ED/Hospital discharge?: N/A  MyChart active?: No  Patient agreeable to assistance with activating MyChart?: No    Patient accepts CC: Yes. Patient scheduled for assessment with CC SW on 7/24 at 2 pm. Patient noted desire to discuss transportation and additional help at home..     TATE Sykes [place]. [place] [facility]  [phone]

## 2025-07-24 ENCOUNTER — PATIENT OUTREACH (OUTPATIENT)
Dept: NURSING | Facility: CLINIC | Age: 65
End: 2025-07-24
Payer: COMMERCIAL

## 2025-07-24 ASSESSMENT — ACTIVITIES OF DAILY LIVING (ADL): DEPENDENT_IADLS:: CLEANING;COOKING;LAUNDRY;SHOPPING;MEAL PREPARATION;MEDICATION MANAGEMENT;TRANSPORTATION

## 2025-07-24 NOTE — PROGRESS NOTES
"Clinic Care Coordination Contact    SW initial phone assessment.  SW called patient via [employer]  #[id number].  CC referral noted resources for transportation and additional help at home.  Patient inquired on which resources were covered by insurance.  SW noted additional home assistance and transportation are not typically covered by insurance. SW informed that these are often paid privately.  Patient declined to proceed with these resources.  Patient inquired how can these services be paid for him?  Patient stated \"PCA\".  SW discussed and provided the # for [place] assessment in [facility].  SW informed that one must be eligible and have MA in place to obtain a potential PCA.  Patient thanked SW, stating no more questions to the .  Patient was not interested in a future call.      SW will not assist further, will close to CC     Corrina Hooker, GERALD, MSW   [facility]  Care Coordination  [facility]  [phone]  7/24/2025 2:33 PM           "

## (undated) DEVICE — DRSG KERLIX FLUFFS X5

## (undated) DEVICE — DRAPE MICROSCOPE EQUIP 48X120" 6130VL2

## (undated) DEVICE — ESU ELEC BLADE 2.75" COATED/INSULATED E1455

## (undated) DEVICE — ADH LIQUID MASTISOL TOPICAL VIAL 2-3ML 0523-48

## (undated) DEVICE — DRAPE MAYO STAND 23X54 8337

## (undated) DEVICE — DRAPE COVER C-ARM SEAMLESS SNAP-KAP 03-KP26 LATEX FREE

## (undated) DEVICE — DRSG STERI STRIP 1/2X4" R1547

## (undated) DEVICE — GLOVE PROTEXIS W/NEU-THERA 6.5  2D73TE65

## (undated) DEVICE — CATH TRAY FOLEY COUDE SURESTEP 16FR W/URNE MTR STLK A304716A

## (undated) DEVICE — Device

## (undated) DEVICE — SPONGE SURGIFOAM 50

## (undated) DEVICE — GLOVE PROTEXIS BLUE W/NEU-THERA 8.0  2D73EB80

## (undated) DEVICE — RX SURGIFLO HEMOSTATIC MATRIX W/THROMBIN 8ML 2994

## (undated) DEVICE — LINEN TOWEL PACK X5 5464

## (undated) DEVICE — DRAPE SHEET REV FOLD 3/4 9349

## (undated) DEVICE — ESU GROUND PAD UNIVERSAL W/O CORD

## (undated) DEVICE — SUCTION FRAZIER 12FR W/HANDLE K73

## (undated) DEVICE — MIDAS REX DISSECTING TOOL  14MH30

## (undated) DEVICE — SOL WATER IRRIG 1000ML BOTTLE 2F7114

## (undated) DEVICE — GLOVE PROTEXIS BLUE W/NEU-THERA 8.5  2D73EB85

## (undated) DEVICE — SU VICRYL 2-0 CT-2 CR 8X18" J726D

## (undated) DEVICE — SYR EAR BULB 3OZ 0035830

## (undated) DEVICE — GLOVE PROTEXIS W/NEU-THERA 7.5  2D73TE75

## (undated) DEVICE — TUBING SUCTION SOFT 20'X3/16" 0036570

## (undated) DEVICE — SU VICRYL 0 CT-1 CR 8X18" J740D

## (undated) DEVICE — POSITIONER PT PRONESAFE HEAD REST W/DERMAPROX INSERT 40599

## (undated) DEVICE — GLOVE PROTEXIS BLUE W/NEU-THERA 6.5  2D73EB65

## (undated) DEVICE — DRSG TELFA ISLAND 4X8" 7541

## (undated) DEVICE — GOWN XLG DISP 9545

## (undated) DEVICE — PACK SPINE SM CUSTOM SNE15SSFSK

## (undated) DEVICE — NDL SPINAL 18GA 3.5" 405184

## (undated) DEVICE — WIPES FOLEY CARE SURESTEP PROVON DFC100

## (undated) DEVICE — SU MONOCRYL 4-0 PS-2 18" UND Y496G

## (undated) DEVICE — GLOVE PROTEXIS W/NEU-THERA 8.5  2D73TE85

## (undated) RX ORDER — ACETAMINOPHEN 325 MG/1
TABLET ORAL
Status: DISPENSED
Start: 2019-09-18

## (undated) RX ORDER — FENTANYL CITRATE 50 UG/ML
INJECTION, SOLUTION INTRAMUSCULAR; INTRAVENOUS
Status: DISPENSED
Start: 2019-09-18

## (undated) RX ORDER — GABAPENTIN 300 MG/1
CAPSULE ORAL
Status: DISPENSED
Start: 2019-09-18

## (undated) RX ORDER — CLINDAMYCIN PHOSPHATE 900 MG/50ML
INJECTION, SOLUTION INTRAVENOUS
Status: DISPENSED
Start: 2019-09-18

## (undated) RX ORDER — PROPOFOL 10 MG/ML
INJECTION, EMULSION INTRAVENOUS
Status: DISPENSED
Start: 2019-09-18

## (undated) RX ORDER — PHENYLEPHRINE HCL IN 0.9% NACL 50MG/250ML
PLASTIC BAG, INJECTION (ML) INTRAVENOUS
Status: DISPENSED
Start: 2019-09-18

## (undated) RX ORDER — HYDROMORPHONE HYDROCHLORIDE 1 MG/ML
INJECTION, SOLUTION INTRAMUSCULAR; INTRAVENOUS; SUBCUTANEOUS
Status: DISPENSED
Start: 2019-09-18

## (undated) RX ORDER — ALBUTEROL SULFATE 90 UG/1
AEROSOL, METERED RESPIRATORY (INHALATION)
Status: DISPENSED
Start: 2019-09-18

## (undated) RX ORDER — REMIFENTANIL HYDROCHLORIDE 1 MG/ML
INJECTION, POWDER, LYOPHILIZED, FOR SOLUTION INTRAVENOUS
Status: DISPENSED
Start: 2019-09-18